# Patient Record
Sex: FEMALE | Race: WHITE | NOT HISPANIC OR LATINO | Employment: OTHER | URBAN - METROPOLITAN AREA
[De-identification: names, ages, dates, MRNs, and addresses within clinical notes are randomized per-mention and may not be internally consistent; named-entity substitution may affect disease eponyms.]

---

## 2017-01-10 ENCOUNTER — ALLSCRIPTS OFFICE VISIT (OUTPATIENT)
Dept: OTHER | Facility: OTHER | Age: 57
End: 2017-01-10

## 2017-01-10 DIAGNOSIS — Z12.31 ENCOUNTER FOR SCREENING MAMMOGRAM FOR MALIGNANT NEOPLASM OF BREAST: ICD-10-CM

## 2017-02-21 ENCOUNTER — GENERIC CONVERSION - ENCOUNTER (OUTPATIENT)
Dept: OTHER | Facility: OTHER | Age: 57
End: 2017-02-21

## 2017-02-24 ENCOUNTER — GENERIC CONVERSION - ENCOUNTER (OUTPATIENT)
Dept: OTHER | Facility: OTHER | Age: 57
End: 2017-02-24

## 2017-05-08 ENCOUNTER — HOSPITAL ENCOUNTER (OUTPATIENT)
Dept: RADIOLOGY | Facility: HOSPITAL | Age: 57
Discharge: HOME | End: 2017-05-08
Attending: FAMILY MEDICINE
Payer: COMMERCIAL

## 2017-05-08 DIAGNOSIS — Z12.31 ENCOUNTER FOR SCREENING MAMMOGRAM FOR MALIGNANT NEOPLASM OF BREAST: ICD-10-CM

## 2017-05-08 PROCEDURE — G0202 SCR MAMMO BI INCL CAD: HCPCS

## 2017-05-25 ENCOUNTER — ALLSCRIPTS OFFICE VISIT (OUTPATIENT)
Dept: OTHER | Facility: OTHER | Age: 57
End: 2017-05-25

## 2017-05-25 DIAGNOSIS — R10.32 LEFT LOWER QUADRANT PAIN: ICD-10-CM

## 2017-06-06 ENCOUNTER — GENERIC CONVERSION - ENCOUNTER (OUTPATIENT)
Dept: OTHER | Facility: OTHER | Age: 57
End: 2017-06-06

## 2017-08-28 ENCOUNTER — ALLSCRIPTS OFFICE VISIT (OUTPATIENT)
Dept: OTHER | Facility: OTHER | Age: 57
End: 2017-08-28

## 2017-10-24 NOTE — PROGRESS NOTES
Assessment  1  Medication monitoring encounter (V58 83) (Z51 81)   2  Screening for colorectal cancer (V76 51) (Z12 11,Z12 12)   3  Acute suppurative otitis media of right ear without spontaneous rupture of tympanic   membrane, recurrence not specified (382 00) (H66 001)   4  Acute maxillary sinusitis (461 0) (J01 00)   5  Acute otitis externa of right ear, unspecified type (380 10) (H60 501)    Plan   Acute otitis externa of right ear, unspecified type    · Neomycin-Polymyxin-HC 3 [id number] Otic Solution; INSTILL 4 DROPS INTO  BOTH EARS 3-4 TIMES DAILY    Herberth Land MD, Marzella Closs (Ophthalmology) Co-Management  monitoring opthalmologic toxicity of Plaquenil for RA  Status: Hold For - Scheduling  Requested for: 92Noe9921  Ordered; For: Medication monitoring encounter;  Ordered By: Catalino Cloud  Performed:   Due: 21MWR9312  3 - Debi MACDONALD, Redd Phelps  (Internal Medicine) Co-Management  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  Status: Hold For - Scheduling  Requested for: 70Icz1315  Ordered; For: History of seronegative inflammatory arthritis, Inflammatory arthritis, Medication monitoring encounter, Multiple joint pain;  Ordered By: Catalino Cloud  Performed:   Due: 55XQF2626  1 - Ella See MD, Abel Gan  (Otolaryngology) Co-Management  History of chronic recurrent ear infections and hearing loss, mutiple tubes placed  s/p sinus drainage 3 times past 10 years  had deviated septum surgery by Dr Navdeep Roldan  Status: Mancil Maker for: 92HER7910  Ordered;    For: Acute suppurative otitis media of right ear without spontaneous rupture of tympanic membrane, recurrence not specified;  Ordered By: Catalino Cloud  Performed:   Due: 53QEQ9219; Last Updated By: Catalino Cloud; 8/28/2017 2:39:27 PM     Discussion/Summary    # Otitis Externa:Rx for Neomycin polymyxin Otic solutionMedication monitoring for Plaquentil for RA Treatement:referral to Dr Herberth Land, opthalmologistSeronegative RArenewed referral to Dr Gutierrez Baxter for CRCreferral to GI for colonoscopyCervical CA screen: instructed patient to make appt for PAP  as needed  Dr Kristian Morales  Possible side effects of new medications were reviewed with the patient/guardian today  The treatment plan was reviewed with the patient/guardian  The patient/guardian understands and agrees with the treatment plan      Chief Complaint  Patient presents with right ear pain  History of Present Illness  HPI: 58 yo F PMH chronic recurrent ear infections and hearing loss, RA comes in for ear pain  Right ear pain 2-3 months ago but worsened past week associated with instance of discharge, sinus pain over last few months  Pain worsens when touching near opening of ear  Tried OTC Aleve without relief  Denies recent fevers or chills  has extensive ENT history with chronic ear infections, sinusitis with multiple tubes placed, s/p sinus drainage 3 times past 10 years  Patient has even had deviated septum surgery by Dr Mario Hernandez; chronic sinus infection      Review of Systems    Constitutional: as noted in HPI    ENT: as noted in HPI  Respiratory: no shortness of breath,-- no cough-- and-- no wheezing  Gastrointestinal: no nausea-- and-- no diarrhea  ROS reviewed  Active Problems  1  Abdominal pain, LLQ (left lower quadrant) (789 04) (R10 32)   2  Acute non-recurrent frontal sinusitis (461 1) (J01 10)   3  Allergic rhinitis (477 9) (J30 9)   4  Benign essential HTN (401 1) (I10)   5  BMI 40 0-44 9, adult (V85 41) (Z68 41)   6  Diverticulosis (562 10) (K57 90)   7  Encounter for screening mammogram for breast cancer (V76 12) (Z12 31)   8  Generalized osteoarthritis of multiple sites (715 09) (M15 9)   9  History of seronegative inflammatory arthritis (V13 59) (Z87 39)   10  Lumbosacral radiculitis (724 4) (M54 17)   11  Multiple joint pain (719 49) (M25 50)   12  Screening for diabetes mellitus (V77 1) (Z13 1)   13  Thyroid disorder screening (V77 0) (Z13 29)    Past Medical History  1   History of diverticulitis of colon (V12 79) (Z87 19)   2  History of hypothyroidism (V12 29) (Z86 39)   3  History of Lyme disease (V12 09) (Z86 19)   4  History of varicose veins (V12 59) (Z86 79)   5  History of Liver cyst (573 8) (K76 89)   6  History of Positive Anaplasma serology (082 40) (A77 40)   7  History of Renal cyst (753 10) (N28 1)   8  History of Seizure disorder in pregnancy (649 40,345 90) (O99 350,G40 909)  Active Problems And Past Medical History Reviewed: The active problems and past medical history were reviewed and updated today  Family History  Mother    1  Family history of Hypertension, benign  Father    2  Family history of arthritis (V17 7) (Z82 61)   3  Family history of hyperthyroidism (V18 19) (Z83 49)   4  Family history of Hypertension, benign  Family History Reviewed: The family history was reviewed and updated today  Social History   · Denied: History of Alcohol use   · Always uses seat belt   · Denied: History of Drug use   · Never a smoker  The social history was reviewed and updated today  The social history was reviewed and is unchanged  Surgical History  1  History of Ear Pressure Equalization Tube, Insertion, Bilaterally   2  History of Fusion / Refusion Of 2-8 Vertebrae   3  History of Laparoscopy With Adnexectomy   4  History of Liver Surgery   5  History of Partial Colectomy - Sigmoid   6  History of Sinus Surgery   7  History of Tonsillectomy   8  History of Total Knee Replacement Left   9  History of Total Knee Replacement Right  Surgical History Reviewed: The surgical history was reviewed and updated today  Current Meds   1  Atenolol 50 MG Oral Tablet; take 1 tablet by mouth once daily; Therapy: 04Yst8551 to (Evaluate:70Lke0215)  Requested for: 39Zgo6103; Last   Rx:60Ivq6545 Ordered   2  Fluticasone Propionate 50 MCG/ACT Nasal Suspension; Use 1 spray in each nostril twice   daily as needed for nasal congestion;    Therapy: 23RFB1387 to (Last OO:07DSV2697)  Requested for: 74SXB9195 Ordered   3  HydroCHLOROthiazide 25 MG Oral Tablet; take 1 tablet by mouth once daily; Therapy: 07IQY7199 to (96 042693)  Requested for: 21Mar2017; Last   Rx:21Mar2017 Ordered   4  Loratadine 10 MG Oral Tablet; take 1 tablet by mouth once daily; Therapy: 76Wmx9163 to (Evaluate:05Jan2018)  Requested for: 20VQC8677; Last   Rx:10Jan2017 Ordered   5  Meloxicam 15 MG Oral Tablet; Therapy: (Recorded:18Apr2016) to Recorded   6  Plaquenil 200 MG Oral Tablet; TAKE 1 TABLET TWICE DAILY WITH FOOD; Therapy: (Recorded:18Apr2016) to Recorded   7  TraMADol HCl - 50 MG Oral Tablet; TAKE 1 TABLET 3 TIMES DAILY AS NEEDED; Therapy: (Recorded:18Apr2016) to Recorded    The medication list was reviewed and updated today  Allergies  1  No Known Drug Allergies  2  No Known Food Allergies   3  No Known Latex Allergies    Vitals   Recorded: 33Axy1189 10:02AM   Temperature 97 8 F   Heart Rate 66   Respiration 16   Systolic 532   Diastolic 84   Height 5 ft 7 in   Weight 258 lb    BMI Calculated 40 41   BSA Calculated 2 25     Physical Exam    Constitutional   General appearance: No acute distress, well appearing and well nourished  Eyes   Conjunctiva and lids: No swelling, erythema or discharge  Pupils and irises: Equal, round and reactive to light  Ears, Nose, Mouth, and Throat   External inspection of ears and nose: Normal     Otoscopic examination: Abnormal   The right tympanic membrane was not bulging  The right external canal was swollen-- and-- was erythematous  Nasal mucosa, septum, and turbinates: Normal without edema or erythema  Oropharynx: Normal with no erythema, edema, exudate or lesions  Pulmonary   Respiratory effort: No increased work of breathing or signs of respiratory distress  Auscultation of lungs: Clear to auscultation  Cardiovascular   Auscultation of heart: Normal rate and rhythm, normal S1 and S2, without murmurs      Examination of extremities for edema and/or varicosities: Normal     Neurologic   Cranial nerves: Cranial nerves 2-12 intact  Sensation: No sensory loss  Attending Note  Attending Note [facility]: Attending Note: I did not interview and examine the patient,-- I supervised the Resident-- and-- I agree with the Resident management plan as it was presented to me  Level of Participation: I was present in clinic, but did not examine the patient  Comments/Additional Findings: Clarification: The acute ear illness this visit is otitis externa; the pt  has a h/o of sinusitis and otitis media with myringotomy tube placement  Signatures   Electronically signed by : Sandor Carmona MD; Aug 28 2017  9:30PM EST                       (Author)    Electronically signed by :  MIGDALIA Wilson ; Aug 29 2017  3:24PM EST                       (Co-author)

## 2017-11-28 ENCOUNTER — TRANSCRIBE ORDERS (OUTPATIENT)
Dept: ADMINISTRATIVE | Facility: HOSPITAL | Age: 57
End: 2017-11-28

## 2017-11-28 ENCOUNTER — HOSPITAL ENCOUNTER (OUTPATIENT)
Dept: RADIOLOGY | Facility: HOSPITAL | Age: 57
Discharge: HOME | End: 2017-11-28
Attending: INTERNAL MEDICINE
Payer: COMMERCIAL

## 2017-11-28 DIAGNOSIS — M54.40 LOW BACK PAIN WITH SCIATICA, SCIATICA LATERALITY UNSPECIFIED, UNSPECIFIED BACK PAIN LATERALITY, UNSPECIFIED CHRONICITY: ICD-10-CM

## 2017-11-28 DIAGNOSIS — M79.671 RIGHT FOOT PAIN: ICD-10-CM

## 2017-11-28 DIAGNOSIS — M06.4 INFLAMMATORY POLYARTHROPATHY (HCC): ICD-10-CM

## 2017-11-28 DIAGNOSIS — M06.09 RHEUMATOID ARTHRITIS OF MULTIPLE SITES WITHOUT RHEUMATOID FACTOR (HCC): ICD-10-CM

## 2017-11-28 DIAGNOSIS — M48.07 LUMBOSACRAL STENOSIS: ICD-10-CM

## 2017-11-28 DIAGNOSIS — M47.816 LUMBAR SPONDYLOSIS: ICD-10-CM

## 2017-11-28 DIAGNOSIS — S86.092S OTHER SPECIFIED INJURY OF LEFT ACHILLES TENDON, SEQUELA: ICD-10-CM

## 2017-11-28 DIAGNOSIS — M50.30 DEGENERATION OF CERVICAL INTERVERTEBRAL DISC: ICD-10-CM

## 2017-11-28 DIAGNOSIS — M79.643 PAIN OF HAND, UNSPECIFIED LATERALITY: Primary | ICD-10-CM

## 2017-11-28 DIAGNOSIS — M15.0 PRIMARY GENERALIZED HYPERTROPHIC OSTEOARTHROSIS: ICD-10-CM

## 2017-11-28 DIAGNOSIS — M79.643 PAIN OF HAND, UNSPECIFIED LATERALITY: ICD-10-CM

## 2017-11-28 PROCEDURE — 73630 X-RAY EXAM OF FOOT: CPT

## 2017-11-28 PROCEDURE — 73120 X-RAY EXAM OF HAND: CPT

## 2018-01-12 VITALS
TEMPERATURE: 97.8 F | OXYGEN SATURATION: 98 % | BODY MASS INDEX: 40.49 KG/M2 | DIASTOLIC BLOOD PRESSURE: 80 MMHG | RESPIRATION RATE: 18 BRPM | SYSTOLIC BLOOD PRESSURE: 122 MMHG | WEIGHT: 258 LBS | HEIGHT: 67 IN | HEART RATE: 66 BPM

## 2018-01-13 NOTE — RESULT NOTES
Discussion/Summary   Called patient with mammo results  All patient questions answered  Verified Results  * MAMMO SCREENING BILATERAL W CAD [id number] 01:07PM Anatoliy Bradley Order Number: [id number]    - Patient Instructions: To schedule this appointment, please contact Central Scheduling at [phone]  Do not wear any perfume, powder, lotion or deodorant on breast or underarm area  Please bring your doctors order, referral (if needed) and insurance information with you on the day of the test  Failure to bring this information may result in this test being rescheduled  Arrive 15 minutes prior to your appointment time to register  On the day of your test, please bring any prior mammogram or breast studies with you that were not performed at a [facility]  Failure to bring prior exams may result in your test needing to be rescheduled  Test Name Result Flag Reference   MAMMO SCREENING BILATERAL W CAD (Report)     Patient History:   Patient is postmenopausal    Patient's BMI is 24 7  Reason for exam: screening, asymptomatic  Screening     Mammo Screening Bilateral W CAD: May 8, 2017 - Check In #:    [de-identified]   Bilateral CC and MLO view(s) were taken  Technologist: SHANNAN Duarte   Prior study comparison: September 4, 2015, bilateral screening    mammogram, performed at [facility]  September 26, 2014,    bilateral screening mammogram, performed at [facility]      The breast tissue is almost entirely fat  Bilateral digital    mammography is performed  No dominant soft tissue mass,    architectural distortion or suspicious calcifications are noted  The skin and nipple contours are within normal limits  Scattered benign appearing calcifications are noted  No evidence    of malignancy  No significant changes when compared to prior    studies  1  No evidence of malignancy  2  No significant change when compared with the prior study       ACR BI-RADSï¾® Assessments: BiRad:2 - Benign     Recommendation:   Routine screening mammogram of both breasts in 1 year  A reminder letter will be scheduled   Analyzed by CAD     8-10% of cancers will be missed on mammography  Management of a    palpable abnormality must be based on clinical grounds  Patients   will be notified of their results via letter from our facility  Accredited by [employer] of [employer] and FDA       Transcription Location: [facility] [address]: [id number]     Risk Value(s):   Tyrer-Cuzick 10 Year: 2 500%, Tyrer-Cuzick Lifetime: 6 900%,    Myriad Table: 1 5%, APOLINAR 5 Year: 1 3%, NCI Lifetime: 7 6%   Signed by:   Aline Bender MD   5/8/17       Signatures   Electronically signed by : MIGDALIA Roa ; Jun 6 2017  4:47PM EST                       (Author)

## 2018-01-14 VITALS
OXYGEN SATURATION: 97 % | BODY MASS INDEX: 3.92 KG/M2 | HEIGHT: 67 IN | HEART RATE: 78 BPM | DIASTOLIC BLOOD PRESSURE: 70 MMHG | WEIGHT: 25 LBS | SYSTOLIC BLOOD PRESSURE: 118 MMHG | RESPIRATION RATE: 18 BRPM

## 2018-01-14 VITALS
SYSTOLIC BLOOD PRESSURE: 134 MMHG | HEIGHT: 67 IN | BODY MASS INDEX: 40.49 KG/M2 | TEMPERATURE: 97.8 F | RESPIRATION RATE: 16 BRPM | DIASTOLIC BLOOD PRESSURE: 84 MMHG | HEART RATE: 66 BPM | WEIGHT: 258 LBS

## 2018-01-15 NOTE — RESULT NOTES
Message  Called patient's home number  Left message with normal blood work results  Left CFP number for call back as needed        Signatures   Electronically signed by : MIGDALIA Ritchie ; Jun 13 2016  1:07PM EST                       (Author)

## 2018-01-15 NOTE — RESULT NOTES
Verified Results  ([facility]) Comp  Metabolic Panel (12) [id number] 12:00AM Betsey Clock     Test Name Result Flag Reference   Glucose, Serum 103 mg/dL H 65-99   BUN 14 mg/dL  6-24   Creatinine, Serum 0 73 mg/dL  0 57-1 00   eGFR If NonAfricn Am 92 mL/min/1 73  >59   eGFR If Africn Am 106 mL/min/1 73  >59   BUN/Creatinine Ratio 19  9-23   Sodium, Serum 142 mmol/L  134-144   Potassium, Serum 4 2 mmol/L  3 5-5 2   Chloride, Serum 101 mmol/L     Calcium, Serum 9 7 mg/dL  8 7-10 2   Protein, Total, Serum 6 9 g/dL  6 0-8 5   Albumin, Serum 4 6 g/dL  3 5-5 5   Globulin, Total 2 3 g/dL  1 5-4 5   A/G Ratio 2 0  1 1-2 5   Bilirubin, Total 0 6 mg/dL  0 0-1 2   Alkaline Phosphatase, S 109 IU/L     AST (SGOT) 29 IU/L  0-40     (LC) CBC, No Differential/Platelet [id number] 03:62NS Betsey Clock     Test Name Result Flag Reference   WBC 3 3 x10E3/uL L 3 4-10 8   RBC 4 52 x10E6/uL  3 77-5 28   Hemoglobin 14 0 g/dL  11 1-15 9   Hematocrit 41 5 %  34 0-46  6   MCV 92 fL  79-97   MCH 31 0 pg  26 6-33 0   MCHC 33 7 g/dL  31 5-35 7   RDW 13 4 %  12 3-15 4     (LC) Lipid Panel [id number] 12:00AM Betsey Clock     Test Name Result Flag Reference   Cholesterol, Total 177 mg/dL  100-199   Triglycerides 131 mg/dL  0-149   HDL Cholesterol 40 mg/dL  >39   According to ATP-III Guidelines, HDL-C >59 mg/dL is considered a  negative risk factor for CHD  VLDL Cholesterol Vipul 26 mg/dL  5-40   LDL Cholesterol Calc 111 mg/dL H 0-99     (LC) Sedimentation Rate-Westergren 84YOE8947 12:00AM Betsey Clock     Test Name Result Flag Reference   Sedimentation Rate-Westergren 6 mm/hr  0-40     (1) HEPATIC FUNCTION PANEL 59Ppx5316 12:00AM Betsey Clock     Test Name Result Flag Reference   Bilirubin, Direct 0 16 mg/dL  0 00-0 40   ALT (SGPT) 37 IU/L H 0-32       Discussion/Summary   Called patient at home number  Left message stating normal bloodwork  Left CFP number for callback        Signatures   Electronically signed by : Arvind Graham, M D ; Jul 5 2016  1:41PM EST                       (Author)

## 2018-03-01 ENCOUNTER — TELEPHONE (OUTPATIENT)
Dept: GASTROENTEROLOGY | Facility: AMBULARY SURGERY CENTER | Age: 58
End: 2018-03-01

## 2018-04-19 ENCOUNTER — OFFICE VISIT (OUTPATIENT)
Dept: FAMILY MEDICINE CLINIC | Facility: CLINIC | Age: 58
End: 2018-04-19
Payer: COMMERCIAL

## 2018-04-19 VITALS
HEART RATE: 81 BPM | BODY MASS INDEX: 39.55 KG/M2 | SYSTOLIC BLOOD PRESSURE: 150 MMHG | DIASTOLIC BLOOD PRESSURE: 72 MMHG | OXYGEN SATURATION: 99 % | HEIGHT: 67 IN | WEIGHT: 252 LBS | RESPIRATION RATE: 16 BRPM

## 2018-04-19 DIAGNOSIS — R73.9 HYPERGLYCEMIA: ICD-10-CM

## 2018-04-19 DIAGNOSIS — I10 ESSENTIAL HYPERTENSION: ICD-10-CM

## 2018-04-19 DIAGNOSIS — T78.40XA ALLERGIC STATE, INITIAL ENCOUNTER: ICD-10-CM

## 2018-04-19 DIAGNOSIS — Z00.00 HEALTH CARE MAINTENANCE: Primary | ICD-10-CM

## 2018-04-19 DIAGNOSIS — Z12.11 SCREEN FOR COLON CANCER: ICD-10-CM

## 2018-04-19 DIAGNOSIS — E03.9 HYPOTHYROIDISM, UNSPECIFIED TYPE: ICD-10-CM

## 2018-04-19 PROCEDURE — 99396 PREV VISIT EST AGE 40-64: CPT | Performed by: FAMILY MEDICINE

## 2018-04-19 RX ORDER — FLUTICASONE PROPIONATE 50 MCG
SPRAY, SUSPENSION (ML) NASAL
COMMUNITY
Start: 2017-01-10 | End: 2018-09-24 | Stop reason: SDUPTHER

## 2018-04-19 RX ORDER — MELOXICAM 15 MG/1
TABLET ORAL DAILY
COMMUNITY
End: 2020-02-13

## 2018-04-19 RX ORDER — TRAMADOL HYDROCHLORIDE 50 MG/1
1 TABLET ORAL EVERY 8 HOURS PRN
COMMUNITY
End: 2019-01-11

## 2018-04-19 RX ORDER — ATENOLOL 50 MG/1
1 TABLET ORAL DAILY
COMMUNITY
Start: 2016-04-13 | End: 2018-04-19 | Stop reason: ALTCHOICE

## 2018-04-19 RX ORDER — ATENOLOL 100 MG/1
100 TABLET ORAL DAILY
Qty: 30 TABLET | Refills: 6 | Status: SHIPPED | OUTPATIENT
Start: 2018-04-19 | End: 2018-11-30 | Stop reason: SDUPTHER

## 2018-04-19 RX ORDER — LORATADINE 10 MG/1
10 TABLET ORAL DAILY
Qty: 30 TABLET | Refills: 6 | Status: SHIPPED | OUTPATIENT
Start: 2018-04-19 | End: 2018-09-24 | Stop reason: SDUPTHER

## 2018-04-19 RX ORDER — HYDROCHLOROTHIAZIDE 25 MG/1
1 TABLET ORAL DAILY
COMMUNITY
Start: 2016-10-24 | End: 2018-04-20 | Stop reason: CLARIF

## 2018-04-19 RX ORDER — HYDROXYCHLOROQUINE SULFATE 200 MG/1
200 TABLET, FILM COATED ORAL 2 TIMES DAILY WITH MEALS
Refills: 0 | COMMUNITY
Start: 2018-04-14

## 2018-04-19 RX ORDER — LORATADINE 10 MG/1
1 TABLET ORAL DAILY
COMMUNITY
Start: 2016-04-13 | End: 2018-04-19 | Stop reason: SDUPTHER

## 2018-04-20 NOTE — PROGRESS NOTES
Assessment/Plan:    No problem-specific Assessment & Plan notes found for this encounter  Diagnoses and all orders for this visit:    Health care maintenance    Essential hypertension  -     atenolol (TENORMIN) 100 mg tablet; Take 1 tablet (100 mg total) by mouth daily  -     Lipid panel    Hypothyroidism, unspecified type  -     TSH, 3rd generation with T4 reflex    Hyperglycemia  -     HEMOGLOBIN A1C W/ EAG ESTIMATION    Allergic state, initial encounter  -     loratadine (CLARITIN) 10 mg tablet; Take 1 tablet (10 mg total) by mouth daily    Screen for colon cancer  -     Cologuard    Other orders  -     Discontinue: atenolol (TENORMIN) 50 mg tablet; Take 1 tablet by mouth daily  -     RESTASIS MULTIDOSE 0 05 % ophthalmic emulsion;   -     hydrochlorothiazide (HYDRODIURIL) 25 mg tablet; Take 1 tablet by mouth daily  -     fluticasone (FLONASE) 50 mcg/act nasal spray; into each nostril  -     hydroxychloroquine (PLAQUENIL) 200 mg tablet; Take 200 mg by mouth 2 (two) times a day with meals  -     Discontinue: loratadine (CLARITIN) 10 mg tablet; Take 1 tablet by mouth daily  -     meloxicam (MOBIC) 15 mg tablet; Take by mouth  -     traMADol (ULTRAM) 50 mg tablet; Take 1 tablet by mouth 2 (two) times a day          Breast Cancer Screening:  Risks and Benefits discussed  Given slip for mammogram  Cervical Cancer Screening:  Risks and Benefits discussed  Patient stated she will schedule Pap smear    Colorectal Cancer Screening:  Risks and Benefits discussed  Patient refused colonoscopy but will accept Cologuard      Preventing Counseling:  Advice and education were given regarding nutrition, aerobic exercises, weight bearing exercises, cardiovascular risk reduction, fall risk reduction, and age appropriate supplements       The patient was counseled regarding instructions for management, risk factor reductions, prognosis, risks and benefits of treatment options, patient and family education, and importance of compliance with treatment  With her elevated blood pressure I have double up her atenolol, she was advised to follow-up in a month to see how is her blood pressure 2 weeks I also give her slip for her Cholesterol profile, TSH and hemoglobin A1c  Subjective:      Patient ID: Loan Saha is a 59 y o  female  Visit Type: Health Maintenance    General Health:     Dental Regular visits: No    Vision Problems: No    Hearing loss: No    Life Style  Healthy Diet:  Can be improve, try to be healthy  Regular Exercise: No  Weight Concerns: Yes  Tobacco Use: No  Alcohol Use: Social  Drug Use: No      Reproductive Health  Sexually Active: Yes  Contraception: No        Chief Complaint   Patient presents with    Well Check     CPE       47-year-old female comes in for complete physical, she stated she had noticed that her blood pressure has been persistently high, she stated she is not sure why she is on atenolol, she has been taking it for the last 30 years, she did not had a heart attack, She stated to be working fine until recently, recently she was diagnosed to have rheumatoid arthritis with her hematologist, currently taking Plaquenil And Tramadol from Dr Paxton Valenzuela, She stated that she also has history of hypothyroidism however she is currently not taking any medication            The following portions of the patient's history were reviewed and updated as appropriate: allergies, current medications, past family history, past medical history, past social history, past surgical history and problem list       Review of Systems   Constitutional: Negative for activity change, appetite change, chills, diaphoresis, fatigue, fever and unexpected weight change  HENT: Negative for congestion, dental problem, drooling, ear discharge, ear pain, facial swelling, hearing loss, mouth sores, nosebleeds, postnasal drip, rhinorrhea, sinus pain and sinus pressure      Eyes: Negative for photophobia, pain, discharge, redness, itching and visual disturbance  Respiratory: Negative for apnea, cough, choking, chest tightness, shortness of breath, wheezing and stridor  Cardiovascular: Negative for chest pain, palpitations and leg swelling  Gastrointestinal: Negative for abdominal distention, abdominal pain, anal bleeding, blood in stool, constipation, diarrhea, nausea, rectal pain and vomiting  Endocrine: Negative for cold intolerance, heat intolerance, polydipsia, polyphagia and polyuria  Genitourinary: Negative for decreased urine volume, difficulty urinating, dyspareunia, dysuria, flank pain, hematuria and pelvic pain  Musculoskeletal: (+)arthralgias, (+)back pain, (+)gait problem  Skin: Negative for color change, pallor, rash and wound  Allergic/Immunologic: Negative for environmental allergies, food allergies and immunocompromised state  Neurological: Negative for dizziness, tremors, seizures, syncope, facial asymmetry, speech difficulty, weakness, light-headedness, numbness and headaches  Hematological: Negative for adenopathy  Does not bruise/bleed easily  Psychiatric/Behavioral: Negative for agitation, behavioral problems, confusion, decreased concentration, dysphoric mood, hallucinations, sleep disturbance and suicidal ideas  The patient is not nervous/anxious  Objective:    History   Smoking Status    Never Smoker   Smokeless Tobacco    Not on file       Allergies:    Allergies   Allergen Reactions    Other Other (See Comments)     headaches       Vitals:  /72 (BP Location: Left arm, Patient Position: Sitting)   Pulse 81   Resp 16   Ht 5' 7" (1 702 m)   Wt 114 kg (252 lb)   SpO2 99%   BMI 39 47 kg/m²     Current Outpatient Prescriptions   Medication Sig Dispense Refill    fluticasone (FLONASE) 50 mcg/act nasal spray into each nostril      hydrochlorothiazide (HYDRODIURIL) 25 mg tablet Take 1 tablet by mouth daily      loratadine (CLARITIN) 10 mg tablet Take 1 tablet (10 mg total) by mouth daily 30 tablet 6    atenolol (TENORMIN) 100 mg tablet Take 1 tablet (100 mg total) by mouth daily 30 tablet 6    hydroxychloroquine (PLAQUENIL) 200 mg tablet Take 200 mg by mouth 2 (two) times a day with meals  0    meloxicam (MOBIC) 15 mg tablet Take by mouth      RESTASIS MULTIDOSE 0 05 % ophthalmic emulsion       traMADol (ULTRAM) 50 mg tablet Take 1 tablet by mouth 2 (two) times a day       No current facility-administered medications for this visit  Physical Exam   Constitutional:  oriented to person, place, and time  well-developed and well-nourished  No distress  HENT:  Normocephalic and atraumatic, Oropharynx is clear and moist  No oropharyngeal exudate  Conjunctivae and EOM are normal  Pupils are equal, round, and reactive to light  No scleral icterus  Neck: Normal range of motion  Neck supple  No JVD present  No thyromegaly present  Cardiovascular: Normal rate, regular rhythm, normal heart sounds and intact distal pulses  Exam reveals no gallop and no friction rub  No murmur heard  Pulmonary/Chest: Effort normal and breath sounds normal  No respiratory distress  no wheezes  no rales  no tenderness  Abdominal: Soft  Bowel sounds are normal  no distension and no mass  There is no tenderness  There is no rebound and no guarding  Musculoskeletal: Normal range of motion   Lymphadenopathy:   no cervical adenopathy  Neurological: alert and oriented to person, place, and time  No cranial nerve deficit  normal muscle tone  Coordination normal    Skin: Skin is warm and dry  No rash noted  not diaphoretic  No erythema  Psychiatric:normal mood and affect  behavior is normal  Judgment and thought content normal    Nursing note and vitals reviewed            Results Reviewed     None        PHQ9 REVIEWED  Immunization status: Patient need Hepatitis B and adacel, however she had refused both,

## 2018-05-01 ENCOUNTER — TELEPHONE (OUTPATIENT)
Dept: FAMILY MEDICINE CLINIC | Facility: CLINIC | Age: 58
End: 2018-05-01

## 2018-05-03 DIAGNOSIS — I10 ESSENTIAL HYPERTENSION: Primary | ICD-10-CM

## 2018-05-03 RX ORDER — HYDROCHLOROTHIAZIDE 25 MG/1
25 TABLET ORAL DAILY
Qty: 30 TABLET | Refills: 6 | Status: SHIPPED | OUTPATIENT
Start: 2018-05-03 | End: 2018-11-30 | Stop reason: SDUPTHER

## 2018-05-30 LAB — HBA1C MFR BLD HPLC: 5.4 %

## 2018-06-01 LAB
AMBIG ABBREV DEFAULT: NORMAL
CHOLEST SERPL-MCNC: 159 MG/DL (ref 100–199)
EST. AVERAGE GLUCOSE BLD GHB EST-MCNC: 108 MG/DL
HBA1C MFR BLD: 5.4 % (ref 4.8–5.6)
HDLC SERPL-MCNC: 44 MG/DL
LDLC SERPL CALC-MCNC: 96 MG/DL (ref 0–99)
SL AMB VLDL CHOLESTEROL CALC: 19 MG/DL (ref 5–40)
T4 SERPL-MCNC: 6.3 UG/DL (ref 4.5–12)
TRIGL SERPL-MCNC: 95 MG/DL (ref 0–149)
TSH SERPL DL<=0.005 MIU/L-ACNC: 6.76 UIU/ML (ref 0.45–4.5)

## 2018-06-11 ENCOUNTER — OFFICE VISIT (OUTPATIENT)
Dept: FAMILY MEDICINE CLINIC | Facility: CLINIC | Age: 58
End: 2018-06-11
Payer: COMMERCIAL

## 2018-06-11 VITALS
HEART RATE: 73 BPM | WEIGHT: 251 LBS | RESPIRATION RATE: 16 BRPM | OXYGEN SATURATION: 99 % | BODY MASS INDEX: 39.39 KG/M2 | HEIGHT: 67 IN | DIASTOLIC BLOOD PRESSURE: 76 MMHG | SYSTOLIC BLOOD PRESSURE: 122 MMHG

## 2018-06-11 DIAGNOSIS — Z23 NEED FOR TD VACCINE: ICD-10-CM

## 2018-06-11 DIAGNOSIS — E03.8 SUBCLINICAL HYPOTHYROIDISM: ICD-10-CM

## 2018-06-11 DIAGNOSIS — M62.838 MUSCLE SPASM: ICD-10-CM

## 2018-06-11 DIAGNOSIS — I10 ESSENTIAL HYPERTENSION: Primary | ICD-10-CM

## 2018-06-11 DIAGNOSIS — M54.2 NECK PAIN: ICD-10-CM

## 2018-06-11 PROCEDURE — 90471 IMMUNIZATION ADMIN: CPT | Performed by: FAMILY MEDICINE

## 2018-06-11 PROCEDURE — 3008F BODY MASS INDEX DOCD: CPT | Performed by: FAMILY MEDICINE

## 2018-06-11 PROCEDURE — 99214 OFFICE O/P EST MOD 30 MIN: CPT | Performed by: FAMILY MEDICINE

## 2018-06-11 PROCEDURE — 3074F SYST BP LT 130 MM HG: CPT | Performed by: FAMILY MEDICINE

## 2018-06-11 PROCEDURE — 90714 TD VACC NO PRESV 7 YRS+ IM: CPT | Performed by: FAMILY MEDICINE

## 2018-06-11 PROCEDURE — 3078F DIAST BP <80 MM HG: CPT | Performed by: FAMILY MEDICINE

## 2018-06-11 RX ORDER — KETOROLAC TROMETHAMINE 30 MG/ML
60 INJECTION, SOLUTION INTRAMUSCULAR; INTRAVENOUS ONCE
Status: COMPLETED | OUTPATIENT
Start: 2018-06-11 | End: 2018-06-11

## 2018-06-11 RX ORDER — CYCLOBENZAPRINE HCL 10 MG
10 TABLET ORAL 3 TIMES DAILY PRN
Qty: 90 TABLET | Refills: 6 | Status: SHIPPED | OUTPATIENT
Start: 2018-06-11 | End: 2019-01-11

## 2018-06-11 RX ADMIN — KETOROLAC TROMETHAMINE 60 MG: 30 INJECTION, SOLUTION INTRAMUSCULAR; INTRAVENOUS at 11:35

## 2018-06-11 NOTE — PROGRESS NOTES
Assessment/Plan:    No problem-specific Assessment & Plan notes found for this encounter  Diagnoses and all orders for this visit:    Essential hypertension    Subclinical hypothyroidism  -     Ambulatory referral to Endocrinology; Future    Muscle spasm  -     cyclobenzaprine (FLEXERIL) 10 mg tablet; Take 1 tablet (10 mg total) by mouth 3 (three) times a day as needed for muscle spasms  -     ketorolac (TORADOL) 60 mg/2 mL IM injection 60 mg; Inject 2 mL (60 mg total) into the shoulder, thigh, or buttocks once     Neck pain    Need for Td vaccine  -     TD VACCINE GREATER THAN OR EQUAL TO 8YO PRESERVATIVE FREE IM          I have spoke with patient's condition at length, she will go see an endocrinologist regarding her subclinical hypothyroidism, blood pressure is normal she will continue take the medication, cholesterol is normal as well she will continue doing what she is doing and follow up in 3 months  Discussed with the patient and all questioned fully answered  She will call me if any problems arise  Subjective:      Patient ID: Marilynn Leon is a 59 y o  female      Chief Complaint   Patient presents with    Follow-up     BW, also having left shoulder pain from possible neck pain       61-year-old female comes in for follow-up, her high blood pressure seems to be much improved, we had increased atenolol last visit, that his blood pressure is at 122/76, patient stated she is feeling much better as well, she is also complaining of neck pain and muscle spasm on her neck with severe pain numbness and tingling sensation on the left neck and shoulder area, Recent blood work also show patient has high TSH but normal free T4, she stated that she was diagnosed to have subclinical hypothyroidism many years ago, never had any workup but at one point was placed on medication, And subsequently weaned off????  Patient stated that she was never seen the endocrinologist   Last visit we had to adjust her blood pressure medication, her blood pressure seems to be great this visit        The following portions of the patient's history were reviewed and updated as appropriate: allergies, current medications, past family history, past medical history, past social history, past surgical history and problem list       Review of Systems   Constitutional: Negative for activity change, appetite change, fatigue and unexpected weight change  Respiratory: Negative for apnea, cough, choking, chest tightness and shortness of breath  Cardiovascular: Negative for chest pain, palpitations and leg swelling  Gastrointestinal: Negative for abdominal distention, abdominal pain, anal bleeding, blood in stool and constipation  Endocrine: Negative for cold intolerance and heat intolerance  Musculoskeletal: Negative for arthralgias, back pain, gait problem and joint swelling  Neurological: Negative for dizziness and facial asymmetry  Psychiatric/Behavioral: Negative for agitation, behavioral problems, confusion and decreased concentration  Objective:    /76 (BP Location: Left arm, Patient Position: Sitting)   Pulse 73   Resp 16   Ht 5' 7" (1 702 m)   Wt 114 kg (251 lb)   SpO2 99%   BMI 39 31 kg/m²       Physical Exam   Constitutional:  oriented to person, place, and time  well-developed and   well-nourished  No distress  Cardiovascular: Normal rate, regular rhythm, normal heart sounds and intact distal pulses  Exam reveals no gallop and no friction rub  No murmur heard  Pulmonary/Chest: Effort normal and breath sounds normal  No respiratory distress  no wheezes  no rales  no tenderness  Abdominal: Soft  Bowel sounds are normal  no distension  There is no tenderness  There is no rebound and no guarding  Musculoskeletal: Normal range of motion  Some paresthesia on the left neck and shoulder area  Neurological:  alert and oriented to person, place, and time  normal reflexes      Psychiatric: normal mood and affect  behavior is normal  Judgment and thought content normal

## 2018-07-24 ENCOUNTER — TRANSCRIBE ORDERS (OUTPATIENT)
Dept: ADMINISTRATIVE | Facility: HOSPITAL | Age: 58
End: 2018-07-24

## 2018-07-24 ENCOUNTER — HOSPITAL ENCOUNTER (OUTPATIENT)
Dept: RADIOLOGY | Facility: HOSPITAL | Age: 58
Discharge: HOME | End: 2018-07-24
Attending: INTERNAL MEDICINE
Payer: COMMERCIAL

## 2018-07-24 DIAGNOSIS — M72.2 PLANTAR FASCIAL FIBROMATOSIS: ICD-10-CM

## 2018-07-24 DIAGNOSIS — M72.2 PLANTAR FASCIAL FIBROMATOSIS: Primary | ICD-10-CM

## 2018-07-24 DIAGNOSIS — M77.8 TENDINITIS OF LEFT SHOULDER: ICD-10-CM

## 2018-07-24 PROCEDURE — 73630 X-RAY EXAM OF FOOT: CPT

## 2018-07-24 PROCEDURE — 73030 X-RAY EXAM OF SHOULDER: CPT

## 2018-09-12 ENCOUNTER — OFFICE VISIT (OUTPATIENT)
Dept: FAMILY MEDICINE CLINIC | Facility: CLINIC | Age: 58
End: 2018-09-12
Payer: COMMERCIAL

## 2018-09-12 VITALS
RESPIRATION RATE: 16 BRPM | SYSTOLIC BLOOD PRESSURE: 152 MMHG | BODY MASS INDEX: 37.9 KG/M2 | DIASTOLIC BLOOD PRESSURE: 98 MMHG | HEART RATE: 87 BPM | OXYGEN SATURATION: 97 % | WEIGHT: 242 LBS

## 2018-09-12 DIAGNOSIS — M25.512 ACUTE PAIN OF LEFT SHOULDER: Primary | ICD-10-CM

## 2018-09-12 PROCEDURE — 20605 DRAIN/INJ JOINT/BURSA W/O US: CPT | Performed by: FAMILY MEDICINE

## 2018-09-12 NOTE — PROGRESS NOTES
Fitz Valenzuela is a 59 y o  female  /98   Pulse 87   Resp 16   Wt 110 kg (242 lb)   SpO2 97%   BMI 37 90 kg/m²     PROCEDURE: AC Joint Injection  Indication: Left AC joint Pain  Location: Left AC joint  Pre-Procedure Diagnosis: AC joint Osteoarthritis/Pain  Post-Procedure Diagnosis: Same  Performing Physician: Alexus Worthington  Informed consent: Procedure, alternate treatment options, risks, and benefits were thoroughly explained to the patient and informed consent was obtained before the procedure started  History Of Present Illness:  59year old female comes in complaining of left shoulder pain, had injection with rheumatologist feel better for few months, now she is having severe pain again, she had requested me to perform injection due to she is going for vacation  Review of Systems   Constitutional: Negative for activity change, appetite change, fatigue and unexpected weight change  Musculoskeletal: Negative for  back pain, gait problem and joint swelling  (+) Left shoulder arthralgias,  Neurological: Negative for dizziness and facial asymmetry  Psychiatric/Behavioral: Negative for agitation, behavioral problems, confusion and decreased concentration  Physical Exam   Constitutional:  oriented to person, place, and time  well-developed and   well-nourished  No distress  Musculoskeletal: Normal range of motion  Some paresthesia on the left neck and shoulder area  Psychiatric: normal mood and affect  behavior is normal  Judgment and thought content normal      PROCEDURE:  The appropriate time out was taken  The area was prepped in usual sterile fashion  Local anesthesia achieved using Ethyl Chloride spray (Gebauers cold spray)  Lidocaine 1% without epinephrine 2 ml and Kenalog 40mg in 1mL was injected into joint using posterior approach  Band aid applied    DISPOSITION:    Anticipatory guidance, as well as standard post-procedure care, was explained  Return precautions are given   The patient tolerated the procedure well without complications  Follow-up visit in 2 weeks Advised

## 2018-09-24 DIAGNOSIS — T78.40XA ALLERGIC STATE, INITIAL ENCOUNTER: ICD-10-CM

## 2018-09-24 RX ORDER — LORATADINE 10 MG/1
10 TABLET ORAL DAILY
Qty: 30 TABLET | Refills: 0 | Status: SHIPPED | OUTPATIENT
Start: 2018-09-24 | End: 2018-12-17 | Stop reason: SDUPTHER

## 2018-09-24 RX ORDER — FLUTICASONE PROPIONATE 50 MCG
2 SPRAY, SUSPENSION (ML) NASAL DAILY
Qty: 16 G | Refills: 3 | Status: SHIPPED | OUTPATIENT
Start: 2018-09-24 | End: 2020-02-13 | Stop reason: SDUPTHER

## 2018-10-22 ENCOUNTER — OFFICE VISIT (OUTPATIENT)
Dept: OBGYN CLINIC | Facility: CLINIC | Age: 58
End: 2018-10-22
Payer: COMMERCIAL

## 2018-10-22 VITALS
HEIGHT: 67 IN | DIASTOLIC BLOOD PRESSURE: 79 MMHG | HEART RATE: 69 BPM | BODY MASS INDEX: 38.61 KG/M2 | WEIGHT: 246 LBS | SYSTOLIC BLOOD PRESSURE: 151 MMHG

## 2018-10-22 DIAGNOSIS — M75.122 COMPLETE TEAR OF LEFT ROTATOR CUFF: Primary | ICD-10-CM

## 2018-10-22 PROCEDURE — 99244 OFF/OP CNSLTJ NEW/EST MOD 40: CPT | Performed by: ORTHOPAEDIC SURGERY

## 2018-10-22 NOTE — PROGRESS NOTES
Assessment/Plan:  1  Complete tear of left rotator cuff       Scribe Attestation    I,:   Pepeyazan Danielito Call am acting as a scribe while in the presence of the attending physician :        I,:   Liz Borrego MD personally performed the services described in this documentation    as scribed in my presence :              Triny has a large tear of the supraspinatus tendon  I did not feel that physical therapy would help this tear and fear it will only worsen with further retraction  Her pain has continued to worsen over the past year and I feel she would benefit from a left shoulder arthroscopy to repair the rotator cuff  We discussed the procedure in great detail  We also discussed the risks of the surgery  Risk of the surgery are inclusive of but not limited to bleeding, infection, nerve injury, blood clot, worsening of symptoms, not achieving the anticipated results, persistent stiffness, weakness and the need for additional surgery  The patient verbally stated she understood those risks and would like to proceed with the surgery  We also discussed recovery  She will be in a sling for 6 weeks following her surgery and will require extensive amount of physical therapy  She would like to delay surgery until December  She will sit with my  today and arrange a date  Subjective:   Nelly Smith is a 59 y o  female who presents today for evaluation of her left shoulder  She was referred to us by Dr Tawnya Minaya states that she has been experiencing pain in the left shoulder for approximately 1 year that has been worsening recently  Her chief complaint is of the mild to moderate persistent ache in the anterior and lateral aspect of the left shoulder that will radiate to the left upper arm  Overhead activities will cause an increase in her pain  At night she states the shoulder pain is quite bothersome and will wake her often  She does feel better with the use of Kinesio tape    She denies distal paresthesias  An additional complaint includes weakness in the left upper extremity with shoulder movement  Review of Systems   Constitutional: Negative for chills, fever and unexpected weight change  HENT: Negative for hearing loss, nosebleeds and sore throat  Eyes: Negative for pain, redness and visual disturbance  Respiratory: Negative for cough, shortness of breath and wheezing  Cardiovascular: Negative for chest pain, palpitations and leg swelling  Gastrointestinal: Negative for abdominal pain, nausea and vomiting  Endocrine: Negative for polydipsia and polyuria  Genitourinary: Negative for dysuria and hematuria  Musculoskeletal:        See HPI   Skin: Negative for rash and wound  Neurological: Negative for dizziness, numbness and headaches  Psychiatric/Behavioral: Negative for decreased concentration and suicidal ideas  The patient is not nervous/anxious  Past Medical History:   Diagnosis Date    Diverticulitis, colon     Hypothyroidism     Liver cyst     last assessed 4/18/16    Lyme disease     Positive Anaplasma serology     Renal cyst     Seizure disorder in pregnancy ([facility] [address] )     Varicose vein of leg        Past Surgical History:   Procedure Laterality Date    BACK SURGERY      fusion/refusion of 2-8 vertebrae    COLECTOMY      partial sigmoid, last assessed 4/18/16    LAPAROSCOPY      with adnexectomy    LIVER SURGERY      REPLACEMENT TOTAL KNEE Bilateral     SINUS SURGERY      TONSILLECTOMY      TYMPANOSTOMY TUBE PLACEMENT         Family History   Problem Relation Age of Onset    Hypertension Mother     Arthritis Father     Hyperthyroidism Father     Hypertension Father        Social History     Occupational History    Not on file       Social History Main Topics    Smoking status: Never Smoker    Smokeless tobacco: Never Used    Alcohol use No    Drug use: No    Sexual activity: Not on file         Current Outpatient Prescriptions:    atenolol (TENORMIN) 100 mg tablet, Take 1 tablet (100 mg total) by mouth daily, Disp: 30 tablet, Rfl: 6    cyclobenzaprine (FLEXERIL) 10 mg tablet, Take 1 tablet (10 mg total) by mouth 3 (three) times a day as needed for muscle spasms, Disp: 90 tablet, Rfl: 6    fluticasone (FLONASE) 50 mcg/act nasal spray, 2 sprays into each nostril daily, Disp: 16 g, Rfl: 3    hydrochlorothiazide (HYDRODIURIL) 25 mg tablet, Take 1 tablet (25 mg total) by mouth daily, Disp: 30 tablet, Rfl: 6    hydroxychloroquine (PLAQUENIL) 200 mg tablet, Take 200 mg by mouth 2 (two) times a day with meals, Disp: , Rfl: 0    meloxicam (MOBIC) 15 mg tablet, Take by mouth, Disp: , Rfl:     RESTASIS MULTIDOSE 0 05 % ophthalmic emulsion, , Disp: , Rfl:     traMADol (ULTRAM) 50 mg tablet, Take 1 tablet by mouth daily  , Disp: , Rfl:     loratadine (CLARITIN) 10 mg tablet, Take 1 tablet (10 mg total) by mouth daily (Patient not taking: Reported on 10/22/2018 ), Disp: 30 tablet, Rfl: 0    Allergies   Allergen Reactions    Pollen Extract Other (See Comments)     headaches       Objective:  Vitals:    10/22/18 0847   BP: 151/79   Pulse: 69       Left Shoulder Exam     Tenderness   The patient is experiencing no tenderness  Range of Motion   Active Abduction: 160   Forward Flexion: 160   External Rotation: 70   Internal Rotation 0 degrees: L3     Muscle Strength   Abduction: 3/5   Internal Rotation: 5/5   External Rotation: 5/5   Supraspinatus: 3/5   Subscapularis: 5/5     Tests   Hawkin's test: negative  Impingement: positive    Other   Sensation: normal  Pulse: present (2+ radial)     Comments:  Positive empty can            Physical Exam   Constitutional: She is oriented to person, place, and time  She appears well-developed and well-nourished  HENT:   Head: Normocephalic and atraumatic  Eyes: Conjunctivae and EOM are normal    Neck: Normal range of motion  Neck supple  Cardiovascular: Normal heart sounds and intact distal pulses  Pulmonary/Chest: Effort normal and breath sounds normal  No respiratory distress  Neurological: She is alert and oriented to person, place, and time  Skin: Skin is warm and dry  Psychiatric: She has a normal mood and affect  Her behavior is normal    Vitals reviewed  I have personally reviewed pertinent films in PACS and my interpretation is as follows:  MRI of the left shoulder demonstrates a large tear of the supraspinatus tendon with mild retraction

## 2018-11-20 ENCOUNTER — TELEPHONE (OUTPATIENT)
Dept: PREADMISSION TESTING | Facility: HOSPITAL | Age: 58
End: 2018-11-20

## 2018-11-30 DIAGNOSIS — I10 ESSENTIAL HYPERTENSION: ICD-10-CM

## 2018-12-01 RX ORDER — HYDROCHLOROTHIAZIDE 25 MG/1
TABLET ORAL
Qty: 30 TABLET | Refills: 6 | Status: SHIPPED | OUTPATIENT
Start: 2018-12-01 | End: 2019-06-12 | Stop reason: SDUPTHER

## 2018-12-01 RX ORDER — ATENOLOL 100 MG/1
TABLET ORAL
Qty: 30 TABLET | Refills: 6 | Status: SHIPPED | OUTPATIENT
Start: 2018-12-01 | End: 2019-06-12 | Stop reason: SDUPTHER

## 2018-12-07 ENCOUNTER — APPOINTMENT (OUTPATIENT)
Dept: PREADMISSION TESTING | Facility: HOSPITAL | Age: 58
End: 2018-12-07
Payer: COMMERCIAL

## 2018-12-07 ENCOUNTER — OFFICE VISIT (OUTPATIENT)
Dept: LAB | Facility: HOSPITAL | Age: 58
End: 2018-12-07
Attending: ORTHOPAEDIC SURGERY
Payer: COMMERCIAL

## 2018-12-07 ENCOUNTER — TRANSCRIBE ORDERS (OUTPATIENT)
Dept: ADMINISTRATIVE | Facility: HOSPITAL | Age: 58
End: 2018-12-07

## 2018-12-07 DIAGNOSIS — M75.122 COMPLETE TEAR OF LEFT ROTATOR CUFF: ICD-10-CM

## 2018-12-07 DIAGNOSIS — Z01.818 PRE-OP TESTING: ICD-10-CM

## 2018-12-07 DIAGNOSIS — Z01.818 PREOP EXAMINATION: ICD-10-CM

## 2018-12-07 DIAGNOSIS — Z01.818 PREOP EXAMINATION: Primary | ICD-10-CM

## 2018-12-07 LAB
ALBUMIN SERPL BCP-MCNC: 4.1 G/DL (ref 3.5–5)
ALP SERPL-CCNC: 111 U/L (ref 46–116)
ALT SERPL W P-5'-P-CCNC: 33 U/L (ref 12–78)
ANION GAP SERPL CALCULATED.3IONS-SCNC: 8 MMOL/L (ref 4–13)
APTT PPP: 28 SECONDS (ref 24–33)
AST SERPL W P-5'-P-CCNC: 23 U/L (ref 5–45)
BASOPHILS # BLD AUTO: 0.05 THOUSANDS/ΜL (ref 0–0.1)
BASOPHILS NFR BLD AUTO: 2 % (ref 0–1)
BILIRUB SERPL-MCNC: 0.6 MG/DL (ref 0.2–1)
BUN SERPL-MCNC: 16 MG/DL (ref 5–25)
CALCIUM SERPL-MCNC: 9.3 MG/DL (ref 8.3–10.1)
CHLORIDE SERPL-SCNC: 101 MMOL/L (ref 100–108)
CO2 SERPL-SCNC: 30 MMOL/L (ref 21–32)
CREAT SERPL-MCNC: 0.78 MG/DL (ref 0.6–1.3)
EOSINOPHIL # BLD AUTO: 0.13 THOUSAND/ΜL (ref 0–0.61)
EOSINOPHIL NFR BLD AUTO: 4 % (ref 0–6)
ERYTHROCYTE [DISTWIDTH] IN BLOOD BY AUTOMATED COUNT: 12.1 % (ref 11.6–15.1)
GFR SERPL CREATININE-BSD FRML MDRD: 83 ML/MIN/1.73SQ M
GLUCOSE P FAST SERPL-MCNC: 93 MG/DL (ref 65–99)
HCT VFR BLD AUTO: 40.9 % (ref 34.8–46.1)
HGB BLD-MCNC: 13.3 G/DL (ref 11.5–15.4)
IMM GRANULOCYTES # BLD AUTO: 0.01 THOUSAND/UL (ref 0–0.2)
IMM GRANULOCYTES NFR BLD AUTO: 0 % (ref 0–2)
INR PPP: 1 (ref 0.86–1.16)
LYMPHOCYTES # BLD AUTO: 0.76 THOUSANDS/ΜL (ref 0.6–4.47)
LYMPHOCYTES NFR BLD AUTO: 22 % (ref 14–44)
MCH RBC QN AUTO: 31.1 PG (ref 26.8–34.3)
MCHC RBC AUTO-ENTMCNC: 32.5 G/DL (ref 31.4–37.4)
MCV RBC AUTO: 96 FL (ref 82–98)
MONOCYTES # BLD AUTO: 0.3 THOUSAND/ΜL (ref 0.17–1.22)
MONOCYTES NFR BLD AUTO: 9 % (ref 4–12)
NEUTROPHILS # BLD AUTO: 2.17 THOUSANDS/ΜL (ref 1.85–7.62)
NEUTS SEG NFR BLD AUTO: 63 % (ref 43–75)
NRBC BLD AUTO-RTO: 0 /100 WBCS
PLATELET # BLD AUTO: 225 THOUSANDS/UL (ref 149–390)
PMV BLD AUTO: 9.8 FL (ref 8.9–12.7)
POTASSIUM SERPL-SCNC: 3.7 MMOL/L (ref 3.5–5.3)
PROT SERPL-MCNC: 7.3 G/DL (ref 6.4–8.2)
PROTHROMBIN TIME: 10.5 SECONDS (ref 9.4–11.7)
RBC # BLD AUTO: 4.28 MILLION/UL (ref 3.81–5.12)
SODIUM SERPL-SCNC: 139 MMOL/L (ref 136–145)
WBC # BLD AUTO: 3.42 THOUSAND/UL (ref 4.31–10.16)

## 2018-12-07 PROCEDURE — 85025 COMPLETE CBC W/AUTO DIFF WBC: CPT

## 2018-12-07 PROCEDURE — 85730 THROMBOPLASTIN TIME PARTIAL: CPT

## 2018-12-07 PROCEDURE — 85610 PROTHROMBIN TIME: CPT

## 2018-12-07 PROCEDURE — 80053 COMPREHEN METABOLIC PANEL: CPT

## 2018-12-07 PROCEDURE — 93005 ELECTROCARDIOGRAM TRACING: CPT

## 2018-12-07 PROCEDURE — 36415 COLL VENOUS BLD VENIPUNCTURE: CPT

## 2018-12-07 NOTE — PRE-PROCEDURE INSTRUCTIONS
My Surgical Experience    The following information was developed to assist you to prepare for your operation  What do I need to do before coming to the hospital?   Arrange for a responsible person to drive you to and from the hospital    Arrange care for your children at home  Children are not allowed in the recovery areas of the hospital   Plan to wear clothing that is easy to put on and take off  If you are having shoulder surgery, wear a shirt that buttons or zippers in the front  Bathing  o Shower the evening before and the morning of your surgery with an antibacterial soap  Please refer to the Pre Op Showering Instructions for Surgery Patients Sheet   o Remove nail polish and all body piercing jewelry  o Do not shave any body part for at least 24 hours before surgery-this includes face, arms, legs and upper body  Food  o Nothing to eat or drink after midnight the night before your surgery  This includes candy and chewing gum  o Exception: If your surgery is after 12:00pm (noon), you may have clear liquids such as 7-Up®, ginger ale, apple or cranberry juice, Jell-O®, water, or clear broth until 8:00 am  o Do not drink milk or juice with pulp on the morning before surgery  o Do not drink alcohol 24 hours before surgery  Medicine  o Follow instructions you received from your surgeon about which medicines you may take on the day of surgery  o If instructed to take medicine on the morning of surgery, take pills with just a small sip of water  Call your prescribing doctor for specific infroamtion on what to do if you take insulin    What should I bring to the hospital?    Bring:  [place] Jade or a walker, if you have them, for foot or knee surgery   A list of the daily medicines, vitamins, minerals, herbals and nutritional supplements you take   Include the dosages of medicines and the time you take them each day   Glasses, dentures or hearing aids   Minimal clothing; you will be wearing hospital sleepwear   Photo ID; required to verify your identity   If you have a Living Will or Power of , bring a copy of the documents   If you have an ostomy, bring an extra pouch and any supplies you use    Do not bring   Medicines or inhalers   Money, valuables or jewelry    What other information should I know about the day of surgery?  Notify your surgeons if you develop a cold, sore throat, cough, fever, rash or any other illness   Report to the Ambulatory Surgical/Same Day Surgery Unit   You will be instructed to stop at Registration only if you have not been pre-registered   Inform your  fi they do not stay that they will be asked by the staff to leave a phone number where they can be reached   Be available to be reached before surgery  In the event the operating room schedule changes, you may be asked to come in earlier or later than expected    *It is important to tell your doctor and others involved in your health care if you are taking or have been taking any non-prescription drugs, vitamins, minerals, herbals or other nutritional supplements  Any of these may interact with some food or medicines and cause a reaction      Pre-Surgery Instructions:   Medication Instructions    atenolol (TENORMIN) 100 mg tablet Instructed patient per Anesthesia Guidelines   cyclobenzaprine (FLEXERIL) 10 mg tablet Instructed patient per Anesthesia Guidelines   fluticasone (FLONASE) 50 mcg/act nasal spray Instructed patient per Anesthesia Guidelines   hydrochlorothiazide (HYDRODIURIL) 25 mg tablet Instructed patient per Anesthesia Guidelines   hydroxychloroquine (PLAQUENIL) 200 mg tablet Instructed patient per Anesthesia Guidelines   loratadine (CLARITIN) 10 mg tablet Instructed patient per Anesthesia Guidelines   meloxicam (MOBIC) 15 mg tablet Instructed patient per Anesthesia Guidelines   RESTASIS MULTIDOSE 0 05 % ophthalmic emulsion Instructed patient per Anesthesia Guidelines      traMADol (ULTRAM) 50 mg tablet Instructed patient per Anesthesia Guidelines  To take atenolol, plaquenil, and  Eye drops a m  Of surgery

## 2018-12-08 LAB
ATRIAL RATE: 59 BPM
P AXIS: 46 DEGREES
PR INTERVAL: 192 MS
QRS AXIS: -31 DEGREES
QRSD INTERVAL: 102 MS
QT INTERVAL: 440 MS
QTC INTERVAL: 435 MS
T WAVE AXIS: -5 DEGREES
VENTRICULAR RATE: 59 BPM

## 2018-12-08 PROCEDURE — 93010 ELECTROCARDIOGRAM REPORT: CPT | Performed by: INTERNAL MEDICINE

## 2018-12-17 ENCOUNTER — OFFICE VISIT (OUTPATIENT)
Dept: FAMILY MEDICINE CLINIC | Facility: CLINIC | Age: 58
End: 2018-12-17
Payer: COMMERCIAL

## 2018-12-17 VITALS
SYSTOLIC BLOOD PRESSURE: 130 MMHG | RESPIRATION RATE: 18 BRPM | BODY MASS INDEX: 38.45 KG/M2 | TEMPERATURE: 98 F | HEIGHT: 67 IN | HEART RATE: 79 BPM | DIASTOLIC BLOOD PRESSURE: 78 MMHG | WEIGHT: 245 LBS | OXYGEN SATURATION: 99 %

## 2018-12-17 DIAGNOSIS — T78.40XA ALLERGIC STATE, INITIAL ENCOUNTER: ICD-10-CM

## 2018-12-17 DIAGNOSIS — Z23 ENCOUNTER FOR IMMUNIZATION: ICD-10-CM

## 2018-12-17 DIAGNOSIS — Z01.818 PRE-OPERATIVE CLEARANCE: Primary | ICD-10-CM

## 2018-12-17 PROCEDURE — 99243 OFF/OP CNSLTJ NEW/EST LOW 30: CPT | Performed by: FAMILY MEDICINE

## 2018-12-17 RX ORDER — LORATADINE 10 MG/1
10 TABLET ORAL DAILY
Qty: 90 TABLET | Refills: 3 | Status: SHIPPED | OUTPATIENT
Start: 2018-12-17 | End: 2018-12-17 | Stop reason: SDUPTHER

## 2018-12-17 RX ORDER — LORATADINE 10 MG/1
10 TABLET ORAL DAILY
Qty: 90 TABLET | Refills: 3 | Status: SHIPPED | OUTPATIENT
Start: 2018-12-17 | End: 2020-02-13 | Stop reason: SDUPTHER

## 2018-12-19 DIAGNOSIS — R94.31 EKG ABNORMALITY: Primary | ICD-10-CM

## 2018-12-19 NOTE — PROGRESS NOTES
PRE-OPERATIVE EVALUATION  [facility]    NAME: Yolie Self  AGE: 59 y o  SEX: female  : 1960     DATE: 2018     Pre-Operative Evaluation      Chief Complaint: Pre-operative Evaluation     Surgery: Left arthroscopy with RTC repair  Anticipated Date of Surgery: 2019  Referring Provider: Self, Referral       History of Present Illness:     Yolie Self is a 59 y o  female who presents to the office today for a preoperative consultation at the request of surgeon, Surgeon who plans on performingLeft arthroscopy with RTC repair  Planned anesthesia is MAC/Isaiah  Patient has a bleeding risk of: no recent abnormal bleeding  Patient does not have objections to receiving blood products if needed  Current anti-platelet/anti-coagulation medications that the patient is prescribed includes: None  Assessment of Chronic Conditions:   - Hypertension: BP stable today     Assessment of Cardiac Risk:  · Denies unstable or severe angina or MI in the last 6 weeks or history of stent placement in the last year   · Denies decompensated heart failure (e g  New onset heart failure, NYHA functional class IV heart failure, or worsening existing heart failure)  · Denies significant arrhythmias such as high grade AV block, symptomatic ventricular arrhythmia, newly recognized ventricular tachycardia, supraventricular tachycardia with resting heart rate >100, or symptomatic bradycardia  · Denies severe heart valve disease including aortic stenosis or symptomatic mitral stenosis     Exercise Capacity:  · Able to walk 4 blocks without symptoms?: Yes  · Able to walk 2 flights without symptoms?: Yes    Prior Anesthesia Reactions: No     Personal history of venous thromboembolic disease? No    History of steroid use for >2 weeks within last year?  No         Review of Systems:   Review of Systems   Constitutional: Negative for activity change, appetite change, chills, diaphoresis, fatigue, fever and unexpected weight change  HENT: Negative for congestion, dental problem, drooling, ear discharge, ear pain, facial swelling, hearing loss, mouth sores, nosebleeds, postnasal drip, rhinorrhea, sinus pain and sinus pressure  Eyes: Negative for photophobia, pain, discharge, redness, itching and visual disturbance  Respiratory: Negative for apnea, cough, choking, chest tightness, shortness of breath, wheezing and stridor  Cardiovascular: Negative for chest pain, palpitations and leg swelling  Gastrointestinal: Negative for abdominal distention, abdominal pain, anal bleeding, blood in stool, constipation, diarrhea, nausea, rectal pain and vomiting  Endocrine: Negative for cold intolerance, heat intolerance, polydipsia, polyphagia and polyuria  Genitourinary: Negative for decreased urine volume, difficulty urinating, dyspareunia, dysuria, flank pain, hematuria and pelvic pain  Musculoskeletal: Negative for arthralgias, back pain, gait problem, joint swelling, myalgias, neck pain and neck stiffness  Skin: Negative for color change, pallor, rash and wound  Allergic/Immunologic: Negative for environmental allergies, food allergies and immunocompromised state  Neurological: Negative for dizziness, tremors, seizures, syncope, facial asymmetry, speech difficulty, weakness, light-headedness, numbness and headaches  Hematological: Negative for adenopathy  Does not bruise/bleed easily  Psychiatric/Behavioral: Negative for agitation, behavioral problems, confusion, decreased concentration, dysphoric mood, hallucinations, sleep disturbance and suicidal ideas  The patient is not nervous/anxious  Current Problem List:     Patient Active Problem List   Diagnosis    Complete tear of left rotator cuff       Allergies:      Allergies   Allergen Reactions    Pollen Extract Other (See Comments)     headaches       Medications:       Current Outpatient Prescriptions:     atenolol (TENORMIN) 100 mg tablet, take 1 tablet by mouth once daily, Disp: 30 tablet, Rfl: 6    cyclobenzaprine (FLEXERIL) 10 mg tablet, Take 1 tablet (10 mg total) by mouth 3 (three) times a day as needed for muscle spasms (Patient taking differently: Take 10 mg by mouth daily at bedtime  ), Disp: 90 tablet, Rfl: 6    fluticasone (FLONASE) 50 mcg/act nasal spray, 2 sprays into each nostril daily, Disp: 16 g, Rfl: 3    hydrochlorothiazide (HYDRODIURIL) 25 mg tablet, take 1 tablet by mouth once daily, Disp: 30 tablet, Rfl: 6    hydroxychloroquine (PLAQUENIL) 200 mg tablet, Take 200 mg by mouth 2 (two) times a day with meals, Disp: , Rfl: 0    loratadine (CLARITIN) 10 mg tablet, Take 1 tablet (10 mg total) by mouth daily, Disp: 90 tablet, Rfl: 3    meloxicam (MOBIC) 15 mg tablet, Take by mouth, Disp: , Rfl:     RESTASIS MULTIDOSE 0 05 % ophthalmic emulsion, Administer to both eyes every 12 (twelve) hours  , Disp: , Rfl:     traMADol (ULTRAM) 50 mg tablet, Take 1 tablet by mouth every 8 (eight) hours as needed  , Disp: , Rfl:     Past Medical History:       Past Medical History:   Diagnosis Date    Diverticulitis, colon     Hypertension     Hypothyroidism     h/o " borderline "  issues    Liver cyst     last assessed 4/18/16    Lyme disease     Positive Anaplasma serology     RA (rheumatoid arthritis) (HCC)     Seizure disorder in pregnancy ([address] )     had 1 seizure x1 1986 during pregnancy- none after    Varicose vein of leg         Past Surgical History:   Procedure Laterality Date    BACK SURGERY      fusion/refusion of 2-8 vertebrae    COLECTOMY      partial sigmoid, last assessed 4/18/16    JOINT REPLACEMENT      LAPAROSCOPY      with adnexectomy    LIVER SURGERY      REPLACEMENT TOTAL KNEE Bilateral     SINUS SURGERY      TONSILLECTOMY      TYMPANOSTOMY TUBE PLACEMENT          Family History   Problem Relation Age of Onset    Hypertension Mother     Arthritis Father     Hyperthyroidism Father     Hypertension Father         Social History     Social History    Marital status: /Civil Union     Spouse name: N/A    Number of children: N/A    Years of education: N/A     Occupational History    Not on file  Social History Main Topics    Smoking status: Never Smoker    Smokeless tobacco: Never Used    Alcohol use No    Drug use: No    Sexual activity: Not on file     Other Topics Concern    Not on file     Social History Narrative    Always uses seat belt        Physical Exam:     Vitals:    12/17/18 1000   BP: 130/78   Pulse: 79   Resp: 18   Temp: 98 °F (36 7 °C)   SpO2: 99%       Physical Exam   Constitutional: She is oriented to person, place, and time  She appears well-developed and well-nourished  No distress  HENT:   Head: Normocephalic and atraumatic  Right Ear: External ear normal    Left Ear: External ear normal    Nose: Nose normal    Mouth/Throat: Oropharynx is clear and moist  No oropharyngeal exudate  Eyes: Conjunctivae and EOM are normal  Pupils are equal, round, and reactive to light  Right eye exhibits no discharge  Left eye exhibits no discharge  No scleral icterus  Neck: Normal range of motion  Neck supple  No JVD present  No thyromegaly present  Cardiovascular: Normal rate, regular rhythm, normal heart sounds and intact distal pulses  Exam reveals no gallop and no friction rub  No murmur heard  Pulmonary/Chest: Effort normal and breath sounds normal  No respiratory distress  She has no wheezes  She has no rales  She exhibits no tenderness  Abdominal: Soft  Bowel sounds are normal  She exhibits no distension and no mass  There is no tenderness  There is no rebound and no guarding  Musculoskeletal: Normal range of motion  She exhibits no edema, tenderness or deformity  Lymphadenopathy:     She has no cervical adenopathy  Neurological: She is alert and oriented to person, place, and time  She has normal reflexes  She displays normal reflexes  No cranial nerve deficit  She exhibits normal muscle tone  Coordination normal    Skin: Skin is warm and dry  No rash noted  She is not diaphoretic  No erythema  Psychiatric: She has a normal mood and affect  Her behavior is normal  Judgment and thought content normal    Nursing note and vitals reviewed  Data:     Pre-operative work-up    Laboratory Results: I have personally reviewed the pertinent laboratory results/reports     EKG: I have personally reviewed pertinent reports  Previous cardiopulmonary studies within the past year: None    Assessment & Recommendations:     1  Pre-operative clearance      Pt is medically low risk for MAC anesthesia   2  Allergic state, initial encounter  loratadine (CLARITIN) 10 mg tablet    DISCONTINUED: loratadine (CLARITIN) 10 mg tablet    DISCONTINUED: loratadine (CLARITIN) 10 mg tablet   3  Encounter for immunization  SYRINGE/SINGLE-DOSE VIAL: influenza vaccine, [id number], quadrivalent, 0 5 mL, preservative-free (AFLURIA, FLUARIX, FLULAVAL, FLUZONE)       Pre-Op Evaluation Assessment  59 y o  female with planned surgery: Let arthroscopy with RTC repair  Known risk factors for perioperative complications: None  Cardiac Risk Estimation: per the Revised Cardiac Risk Index (Circ  100:1043, 1999), the patient's risk factors for cardiac complications include None, putting her in: RCI RISK CLASS I (0 risk factors, risk of major cardiac compl  appr  0 5%)  Current medications which may produce withdrawal symptoms if withheld perioperatively: None  Pre-Op Evaluation Plan  1  Further preoperative workup as follows:   - CBC, CMP, PT/INR done, EKG done    2  Medication Management/Recommendations:   - None, continue medication regimen including morning of surgery, with sip of water    3  Prophylaxis for cardiac events with perioperative beta-blockers: not indicated      4  Patient requires further consultation with: None    Clearance  Patient is CLEARED for surgery under MAC anesthesia without any additional cardiac testing        Gonsalo Laird MD  [facility] [address]  [facility] [address]  Phone#  [phone]  Fax#  [phone]

## 2019-01-02 DIAGNOSIS — M75.122 COMPLETE TEAR OF LEFT ROTATOR CUFF: Primary | ICD-10-CM

## 2019-01-03 ENCOUNTER — ANESTHESIA (OUTPATIENT)
Dept: PERIOP | Facility: HOSPITAL | Age: 59
End: 2019-01-03
Payer: COMMERCIAL

## 2019-01-03 ENCOUNTER — ANESTHESIA EVENT (OUTPATIENT)
Dept: PERIOP | Facility: HOSPITAL | Age: 59
End: 2019-01-03
Payer: COMMERCIAL

## 2019-01-03 ENCOUNTER — HOSPITAL ENCOUNTER (OUTPATIENT)
Facility: HOSPITAL | Age: 59
Setting detail: OUTPATIENT SURGERY
Discharge: HOME | End: 2019-01-03
Attending: ORTHOPAEDIC SURGERY | Admitting: ORTHOPAEDIC SURGERY
Payer: COMMERCIAL

## 2019-01-03 VITALS
TEMPERATURE: 97.3 F | BODY MASS INDEX: 38.37 KG/M2 | OXYGEN SATURATION: 100 % | SYSTOLIC BLOOD PRESSURE: 155 MMHG | WEIGHT: 245 LBS | DIASTOLIC BLOOD PRESSURE: 88 MMHG | HEART RATE: 72 BPM | RESPIRATION RATE: 18 BRPM

## 2019-01-03 PROCEDURE — 29828 SHO ARTHRS SRG BICP TENODSIS: CPT | Performed by: ORTHOPAEDIC SURGERY

## 2019-01-03 PROCEDURE — 29826 SHO ARTHRS SRG DECOMPRESSION: CPT | Performed by: PHYSICIAN ASSISTANT

## 2019-01-03 PROCEDURE — 29827 SHO ARTHRS SRG RT8TR CUF RPR: CPT | Performed by: ORTHOPAEDIC SURGERY

## 2019-01-03 PROCEDURE — C9290 INJ, BUPIVACAINE LIPOSOME: HCPCS | Performed by: STUDENT IN AN ORGANIZED HEALTH CARE EDUCATION/TRAINING PROGRAM

## 2019-01-03 PROCEDURE — 29827 SHO ARTHRS SRG RT8TR CUF RPR: CPT | Performed by: PHYSICIAN ASSISTANT

## 2019-01-03 PROCEDURE — 29826 SHO ARTHRS SRG DECOMPRESSION: CPT | Performed by: ORTHOPAEDIC SURGERY

## 2019-01-03 PROCEDURE — C1713 ANCHOR/SCREW BN/BN,TIS/BN: HCPCS | Performed by: ORTHOPAEDIC SURGERY

## 2019-01-03 PROCEDURE — 29828 SHO ARTHRS SRG BICP TENODSIS: CPT | Performed by: PHYSICIAN ASSISTANT

## 2019-01-03 DEVICE — SYSTEM IMPLANT SPEEDBRIDGE W/4.75 BCMPS SWIVELOCK C: Type: IMPLANTABLE DEVICE | Site: SHOULDER | Status: FUNCTIONAL

## 2019-01-03 RX ORDER — MEPERIDINE HYDROCHLORIDE 25 MG/ML
12.5 INJECTION INTRAMUSCULAR; INTRAVENOUS; SUBCUTANEOUS
Status: DISCONTINUED | OUTPATIENT
Start: 2019-01-03 | End: 2019-01-03 | Stop reason: HOSPADM

## 2019-01-03 RX ORDER — LABETALOL HYDROCHLORIDE 5 MG/ML
10 INJECTION, SOLUTION INTRAVENOUS AS NEEDED
Status: DISCONTINUED | OUTPATIENT
Start: 2019-01-03 | End: 2019-01-03 | Stop reason: HOSPADM

## 2019-01-03 RX ORDER — MIDAZOLAM HYDROCHLORIDE 1 MG/ML
INJECTION INTRAMUSCULAR; INTRAVENOUS AS NEEDED
Status: DISCONTINUED | OUTPATIENT
Start: 2019-01-03 | End: 2019-01-03 | Stop reason: SURG

## 2019-01-03 RX ORDER — CEFAZOLIN SODIUM 2 G/50ML
2000 SOLUTION INTRAVENOUS ONCE
Status: COMPLETED | OUTPATIENT
Start: 2019-01-03 | End: 2019-01-03

## 2019-01-03 RX ORDER — FENTANYL CITRATE 50 UG/ML
INJECTION, SOLUTION INTRAMUSCULAR; INTRAVENOUS AS NEEDED
Status: DISCONTINUED | OUTPATIENT
Start: 2019-01-03 | End: 2019-01-03 | Stop reason: SURG

## 2019-01-03 RX ORDER — PROMETHAZINE HYDROCHLORIDE 25 MG/ML
12.5 INJECTION, SOLUTION INTRAMUSCULAR; INTRAVENOUS ONCE AS NEEDED
Status: DISCONTINUED | OUTPATIENT
Start: 2019-01-03 | End: 2019-01-03 | Stop reason: HOSPADM

## 2019-01-03 RX ORDER — ONDANSETRON 2 MG/ML
4 INJECTION INTRAMUSCULAR; INTRAVENOUS ONCE AS NEEDED
Status: DISCONTINUED | OUTPATIENT
Start: 2019-01-03 | End: 2019-01-03 | Stop reason: HOSPADM

## 2019-01-03 RX ORDER — EPHEDRINE SULFATE 50 MG/ML
INJECTION, SOLUTION INTRAVENOUS AS NEEDED
Status: DISCONTINUED | OUTPATIENT
Start: 2019-01-03 | End: 2019-01-03 | Stop reason: SURG

## 2019-01-03 RX ORDER — SODIUM CHLORIDE, SODIUM LACTATE, POTASSIUM CHLORIDE, CALCIUM CHLORIDE 600; 310; 30; 20 MG/100ML; MG/100ML; MG/100ML; MG/100ML
125 INJECTION, SOLUTION INTRAVENOUS CONTINUOUS
Status: DISCONTINUED | OUTPATIENT
Start: 2019-01-03 | End: 2019-01-03 | Stop reason: HOSPADM

## 2019-01-03 RX ORDER — FENTANYL CITRATE/PF 50 MCG/ML
50 SYRINGE (ML) INJECTION
Status: DISCONTINUED | OUTPATIENT
Start: 2019-01-03 | End: 2019-01-03 | Stop reason: HOSPADM

## 2019-01-03 RX ORDER — ONDANSETRON 2 MG/ML
INJECTION INTRAMUSCULAR; INTRAVENOUS AS NEEDED
Status: DISCONTINUED | OUTPATIENT
Start: 2019-01-03 | End: 2019-01-03 | Stop reason: SURG

## 2019-01-03 RX ORDER — PROPOFOL 10 MG/ML
INJECTION, EMULSION INTRAVENOUS AS NEEDED
Status: DISCONTINUED | OUTPATIENT
Start: 2019-01-03 | End: 2019-01-03 | Stop reason: SURG

## 2019-01-03 RX ORDER — LIDOCAINE HYDROCHLORIDE 10 MG/ML
INJECTION, SOLUTION INFILTRATION; PERINEURAL AS NEEDED
Status: DISCONTINUED | OUTPATIENT
Start: 2019-01-03 | End: 2019-01-03 | Stop reason: SURG

## 2019-01-03 RX ORDER — GLYCOPYRROLATE 0.2 MG/ML
INJECTION INTRAMUSCULAR; INTRAVENOUS AS NEEDED
Status: DISCONTINUED | OUTPATIENT
Start: 2019-01-03 | End: 2019-01-03 | Stop reason: SURG

## 2019-01-03 RX ORDER — HYDROMORPHONE HCL/PF 1 MG/ML
0.4 SYRINGE (ML) INJECTION
Status: DISCONTINUED | OUTPATIENT
Start: 2019-01-03 | End: 2019-01-03 | Stop reason: HOSPADM

## 2019-01-03 RX ADMIN — MIDAZOLAM HYDROCHLORIDE 1 MG: 1 INJECTION, SOLUTION INTRAMUSCULAR; INTRAVENOUS at 07:43

## 2019-01-03 RX ADMIN — EPHEDRINE SULFATE 10 MG: 50 INJECTION, SOLUTION INTRAMUSCULAR; INTRAVENOUS; SUBCUTANEOUS at 08:43

## 2019-01-03 RX ADMIN — EPHEDRINE SULFATE 10 MG: 50 INJECTION, SOLUTION INTRAMUSCULAR; INTRAVENOUS; SUBCUTANEOUS at 08:33

## 2019-01-03 RX ADMIN — CEFAZOLIN SODIUM 2000 MG: 2 SOLUTION INTRAVENOUS at 08:11

## 2019-01-03 RX ADMIN — PROPOFOL 180 MG: 10 INJECTION, EMULSION INTRAVENOUS at 08:16

## 2019-01-03 RX ADMIN — ONDANSETRON 4 MG: 2 INJECTION INTRAMUSCULAR; INTRAVENOUS at 08:19

## 2019-01-03 RX ADMIN — FENTANYL CITRATE 50 MCG: 50 INJECTION, SOLUTION INTRAMUSCULAR; INTRAVENOUS at 07:39

## 2019-01-03 RX ADMIN — LIDOCAINE HYDROCHLORIDE 50 MG: 10 INJECTION, SOLUTION INFILTRATION; PERINEURAL at 08:16

## 2019-01-03 RX ADMIN — DEXAMETHASONE SODIUM PHOSPHATE 4 MG: 10 INJECTION INTRAMUSCULAR; INTRAVENOUS at 08:19

## 2019-01-03 RX ADMIN — FENTANYL CITRATE 50 MCG: 50 INJECTION, SOLUTION INTRAMUSCULAR; INTRAVENOUS at 07:43

## 2019-01-03 RX ADMIN — SODIUM CHLORIDE, SODIUM LACTATE, POTASSIUM CHLORIDE, AND CALCIUM CHLORIDE: .6; .31; .03; .02 INJECTION, SOLUTION INTRAVENOUS at 07:34

## 2019-01-03 RX ADMIN — MIDAZOLAM HYDROCHLORIDE 1 MG: 1 INJECTION, SOLUTION INTRAMUSCULAR; INTRAVENOUS at 07:39

## 2019-01-03 RX ADMIN — GLYCOPYRROLATE 0.2 MG: 0.2 INJECTION, SOLUTION INTRAMUSCULAR; INTRAVENOUS at 08:19

## 2019-01-03 NOTE — OP NOTE
OPERATIVE REPORT  PATIENT NAME: Rola Grubbs    :  1960  MRN: [id number]  Pt Location: [facility] OR ROOM 03    SURGERY DATE: 1/3/2019    Surgeon(s) and Role:     * Erinn Biswas MD - Primary     * Feroz Duncan PA-C - Assisting necessary for the procedure for assistance with minimally invasive arthroscopic techniques for rotator cuff repair and biceps tenodesis and subacromial decompression    Preop Diagnosis:  Complete tear of left rotator cuff [M75 122]    Post-Op Diagnosis Codes:     * Complete tear of left rotator cuff [M75 122] and long head of biceps tendon tear as well as subacromial impingement    Procedure:  Left shoulder arthroscopy with rotator cuff repair utilizing speed bridge technique with 4 anchors and 7 sutures as well as biceps tenodesis utilizing 1 of the speed bridge anchors and 2 sutures and subacromial decompression    Specimen(s):  * No specimens in log *    Estimated Blood Loss:   Minimal    Drains:       Anesthesia Type:   Regional with general    Operative Indications:  Complete tear of left rotator cuff [M75 122]  Triny is a 67-year-old female who has been suffering long-term with left shoulder pain  MRI demonstrated rotator cuff tear  She had failed conservative measures and was having difficulty with activities of daily living and wished to undergo a left shoulder arthroscopy with rotator cuff repair  She understood the risks and benefits of that procedure wished to go ahead  The risks are inclusive of but not limited to infection, stiffness, worsening of symptoms, failure to regain full strength and ability, recurrent tear, failure to achieve anticipated results, and need for further surgery  Operative Findings:  Left shoulder with a stable exam under anesthesia achieving forward flexion and abduction of 160°  External rotation was to 70° internal rotation to 60°    Intra-articular findings demonstrated good appearance of articular cartilage with no loose bodies in the axillary pouch  There was a full-thickness large tear of the supraspinatus tendon with some retraction  The subscapularis tendon is intact  Long head of biceps tendon had high-grade partial tearing particular in the region of the rotator cuff tear and thus required a tenodesis  There was also type 3 subacromial spur that was found requiring acromioplasty for decompression  We did repair the rotator cuff and tenodesed the long head of biceps tendon  We had a stable repair of both  Complications:   None    Procedure and Technique:  Triny was taken to the operating room and placed supine on the OR table  She was given preoperative IV antibiotics and preoperative regional anesthesia by the attending anesthesiologist   General anesthesia was induced and the patient was taken into the beach chair position with all parts well padded and the head in neutral position head anthony  Left upper extremity was prepped and draped in usual sterile fashion after exam under anesthesia as described above  We took a surgical time-out  We did create a posterior portal with 11 blade  Diagnostic arthroscopy begun an anterior portal was made in the rotator interval with 11 blade  We demonstrated good appearance of articular cartilage in the glenohumeral joint with no loose bodies in the axillary pouch  The subscapularis tendon was intact  Long head biceps tendon had a high-grade partial tear requiring tenodesis  Also demonstrated a very large supraspinatus tendon tear with some retraction that was full-thickness  We did debride this back to a stable rim of tissue utilizing a shaver  We also debrided the greater tuberosity to a bleeding bed of bone with a bur  We did create the lateral portal with an 11 blade  The did tag the long head of the biceps through that portal utilizing the suture Passer and a FiberWire suture in a mattress fashion    We were then able to tenotomized the long head of biceps utilizing the Sandy   We then into the subacromial space and found a type 3 subacromial spur  We did decompress this region for acromioplasty utilizing the bur  We then began our double row repair  We placed an anterior medial anchor and passed the FiberTape and FiberWire sutures from that anchor and a mattress configuration  We then placed a 2nd medial row anchor posterior to the 1st passed the FiberTape and FiberWire sutures from that anchor and a mattress configuration  We then took 1 FiberTape suture from each medial row anchor and placed an anterior lateral row anchor  This gave us excellent coverage of the greater tuberosity and good fixation  We then took the FiberWire suture from that anchor and placed a horizontal mattress stitch into the long head of biceps tendon tied that down for fixation of the tenodesis  We then tied 1 limb from that knot stack to 1 limb of the tag suture of the biceps  This is for additional fixation  We did also utilized the other limb of each suture to tie once again for additional fixation  We did remove all excess suture from the long head of biceps tenodesis  We then completed our rotator cuff repair by taking the main to FiberTape sutures placing them into a posterior lateral row anchor  This gave us excellent fixation  We then took the FiberWire suture from that anchor and passed that into the posterior aspect of the rotator cuff and tied that down with arthroscopic knot tying techniques  We then tied the medial row FiberWire sutures with arthroscopic knot tying techniques  We felt that we had an excellent fixation onto a bleeding bed of bone with a double row technique  We were quite pleased with our repair and removed the arthroscopic equipment  We closed the portals with 4-0 nylon suture  Dry, sterile dressings were applied with a sling    She tolerated the procedure well and transferred to recovery room stable condition which will follow up me in 1 week for suture removal   She will be on the rotator cuff repair combined with biceps tenodesis rehabilitation protocol     I was present for the entire procedure and A qualified resident physician was not available    Patient Disposition:  PACU     SIGNATURE: Erinn Biswas MD  DATE: January 3, 2019  TIME: 9:20 AM

## 2019-01-03 NOTE — DISCHARGE INSTRUCTIONS
Instruction Sheet Following RTC repair     Sling:   Wear your sling at all times after your surgery (this includes sleeping), except for when you are doing pendulum exercises, showering, or physical therapy  Additionally, you should not carry anything heavier than a pencil in your hand  Dressing:   Remove all cotton and yellow gauze 48 hours after your surgery  You do not need to put a new dressing on your wound; place Band-Aids on your wound  Showering: You may shower 48 hours after surgery  Please use CAUTION!! Be careful not to slip and fall  The effects of anesthesia and/or medication may make you drowsy or light-headed  Do not soak in a bathtub, hot tub, or pool until the doctor tells you it is O K , to do so  Once you are done showering pat the wound dry and apply a Band-Aid  While in the shower you must keep the arm across the front of the body as if it were still in the sling  Sleeping:   You will most likely have difficulty sleeping in the first few weeks after surgery  Most people find it more comfortable to sleep in a reclining position  You can either sleep in a recliner chair or create this position with pillows  Ice:   You can ice the shoulder to reduce swelling and discomfort  Do not ice the shoulder more than 20 minutes at a time  Let the shoulder warm up before reapplication  Avoid getting you wound wet  If you have a Cryocuff you may keep this on continuously  Follow-up visit:   You need to see the doctor about one week following surgery for your first post-op visit  At that time your sutures (stitches) will be removed  You will be given a prescription to begin physical therapy if you were not already given one  Common concerns:   Bruising and/or swelling of the shoulder, arm, or hand are common after surgery  To relieve this discomfort it is best to ice the shoulder  Please call if:   1  Any oozing or redness of the wound, fevers (>101 3°F), or chills       2  Any difficulty breathing or heaviness in the chest      REMEMBER - these are only guidelines for what to expect  If you have any questions or concerns, please do not hesitate to call the office  [phone]

## 2019-01-03 NOTE — ANESTHESIA PROCEDURE NOTES
Peripheral Block    Patient location during procedure: pre-op  Start time: 1/3/2019 7:47 AM  Reason for block: at surgeon's request and post-op pain management  Staffing  Anesthesiologist: Erasmo Sloan DO  Performed: anesthesiologist   Preanesthetic Checklist  Completed: patient identified, site marked, surgical consent, pre-op evaluation, timeout performed, IV checked, risks and benefits discussed and monitors and equipment checked  Peripheral Block  Patient position: supine  Prep: ChloraPrep  Patient monitoring: heart rate, continuous pulse ox and frequent blood pressure checks  Block type: interscalene  Laterality: left  Injection technique: single-shot  Procedures: ultrasound guided  Ultrasound permanent image saved  Local infiltration: bupivacaine  Infiltration strength: 0 5 %  Dose: 15 mL  Needle  Needle type: Stimuplex   Needle gauge: 22 G  Needle length: 5 cm  Needle localization: ultrasound guidance  Assessment  Injection assessment: incremental injection  Paresthesia pain: none  Heart rate change: no  Slow fractionated injection: yes  Post-procedure:  site cleaned  patient tolerated the procedure well with no immediate complications

## 2019-01-03 NOTE — ANESTHESIA PREPROCEDURE EVALUATION
Review of Systems/Medical History          Cardiovascular  Hypertension controlled,    Pulmonary       GI/Hepatic            Endo/Other  History of thyroid disease , hypothyroidism,      GYN       Hematology   Musculoskeletal  Rheumatoid arthritis ,   Arthritis     Neurology   Psychology           Physical Exam    Airway    Mallampati score: I         Dental       Cardiovascular  Rhythm: regular, Rate: normal,     Pulmonary      Other Findings        Anesthesia Plan  ASA Score- 3     Anesthesia Type- general and regional with ASA Monitors  Additional Monitors:   Airway Plan: LMA  Plan Factors-    Induction- intravenous  Postoperative Plan-     Informed Consent- Anesthetic plan and risks discussed with patient  I personally reviewed this patient with the CRNA  Discussed and agreed on the Anesthesia Plan with the THAI Walker

## 2019-01-03 NOTE — ANESTHESIA POSTPROCEDURE EVALUATION
Post-Op Assessment Note      CV Status:  Stable    Mental Status:  Alert    Hydration Status:  Stable and euvolemic    PONV Controlled:  None    Airway Patency:  Patent    Post Op Vitals Reviewed: Yes          Staff: CRNA       Comments: vss          BP (P) 168/85 (01/03/19 0930)    Temp (!) (P) 97 3 °F (36 3 °C) (01/03/19 0930)    Pulse (P) 62 (01/03/19 0930)   Resp (P) 18 (01/03/19 0930)    SpO2 (P) 100 % (01/03/19 0930)

## 2019-01-03 NOTE — H&P
Assessment/Plan:  Left shoulder rotator cuff tear  The patient would like to proceed with left shoulder arthroscopy with rotator cuff repair and subacromial decompression  We discussed the procedure and risks at length, including but not limited to, infection, bleeding, wound issues, nerve injury, blood clot, stiffness, failure of procedure, and need for additional surgery  We will see the patient back at the time of surgery  Subjective:   Rosalina Gómez is a 59 y o  female with left shoulder rotator cuff tear confirmed by MRI who notes ongoing pain and weakness about her shoulder  Review of Systems   Constitutional: Negative for chills, fever and unexpected weight change  HENT: Negative for hearing loss, nosebleeds and sore throat  Eyes: Negative for pain, redness and visual disturbance  Respiratory: Negative for cough, shortness of breath and wheezing  Cardiovascular: Negative for chest pain, palpitations and leg swelling  Gastrointestinal: Negative for abdominal pain, nausea and vomiting  Endocrine: Negative for polydipsia and polyuria  Genitourinary: Negative for dysuria and hematuria  Musculoskeletal:        See HPI   Skin: Negative for rash and wound  Neurological: Negative for dizziness, numbness and headaches  Psychiatric/Behavioral: Negative for decreased concentration and suicidal ideas  The patient is not nervous/anxious            Past Medical History:   Diagnosis Date    Diverticulitis, colon     Hypertension     Hypothyroidism     h/o " borderline "  issues    Liver cyst     last assessed 4/18/16    Lyme disease     Positive Anaplasma serology     RA [facility]) [facility] )     Seizure disorder in pregnancy ([facility] )     had 1 seizure x1 1986 during pregnancy- none after    Varicose vein of leg        Past Surgical History:   Procedure Laterality Date    BACK SURGERY      fusion/refusion of 2-8 vertebrae    COLECTOMY      partial sigmoid, last assessed 4/18/16    JOINT REPLACEMENT      LAPAROSCOPY      with adnexectomy    LIVER SURGERY      REPLACEMENT TOTAL KNEE Bilateral     SINUS SURGERY      TONSILLECTOMY      TYMPANOSTOMY TUBE PLACEMENT         Family History   Problem Relation Age of Onset    Hypertension Mother     Arthritis Father     Hyperthyroidism Father     Hypertension Father        Social History     Occupational History    Not on file  Social History Main Topics    Smoking status: Never Smoker    Smokeless tobacco: Never Used    Alcohol use No    Drug use: No    Sexual activity: Not on file         Current Facility-Administered Medications:     bupivacaine liposomal (EXPAREL) 1 3 % injection 20 mL, 20 mL, Infiltration, Once, Mario Solo DO    ceFAZolin (ANCEF) IVPB (premix) 2,000 mg, 2,000 mg, Intravenous, Once, Jason Media, PA-C    lactated ringers infusion, 125 mL/hr, Intravenous, Continuous, Mario Solo DO    Allergies   Allergen Reactions    Pollen Extract Other (See Comments)     headaches       Objective:  Vitals:    01/03/19 0655   BP: 165/95   Pulse: 62   Resp: 16   Temp: (!) 97 4 °F (36 3 °C)   SpO2: 96%       Ortho Exam    Physical Exam   Constitutional: She is oriented to person, place, and time  She appears well-developed and well-nourished  No distress  HENT:   Head: Normocephalic and atraumatic  Eyes: Conjunctivae and EOM are normal  No scleral icterus  Neck: No JVD present  Cardiovascular: Intact distal pulses  Pulmonary/Chest: Effort normal  No respiratory distress  Abdominal: Soft  She exhibits no distension  Neurological: She is alert and oriented to person, place, and time  Coordination normal    Skin: Skin is warm  Psychiatric: She has a normal mood and affect  Left shoulder: Positive empty can test  4/5 strength supraspinatus

## 2019-01-07 ENCOUNTER — TELEPHONE (OUTPATIENT)
Dept: OBGYN CLINIC | Facility: CLINIC | Age: 59
End: 2019-01-07

## 2019-01-07 NOTE — TELEPHONE ENCOUNTER
Dr Alena Huizar    Patient left a voicemail today @ 12:25pm    She stated that she is fine after her surgery and is having no complications  She will see you Friday at her appointment

## 2019-01-11 ENCOUNTER — OFFICE VISIT (OUTPATIENT)
Dept: OBGYN CLINIC | Facility: CLINIC | Age: 59
End: 2019-01-11

## 2019-01-11 VITALS
SYSTOLIC BLOOD PRESSURE: 137 MMHG | HEART RATE: 62 BPM | DIASTOLIC BLOOD PRESSURE: 83 MMHG | BODY MASS INDEX: 38.14 KG/M2 | WEIGHT: 243 LBS | HEIGHT: 67 IN

## 2019-01-11 DIAGNOSIS — Z98.890 STATUS POST LEFT ROTATOR CUFF REPAIR: ICD-10-CM

## 2019-01-11 DIAGNOSIS — M75.122 COMPLETE TEAR OF LEFT ROTATOR CUFF: Primary | ICD-10-CM

## 2019-01-11 PROCEDURE — 99024 POSTOP FOLLOW-UP VISIT: CPT | Performed by: ORTHOPAEDIC SURGERY

## 2019-01-11 RX ORDER — OXYCODONE HYDROCHLORIDE AND ACETAMINOPHEN 5; 325 MG/1; MG/1
1 TABLET ORAL EVERY 4 HOURS PRN
Refills: 0 | COMMUNITY
Start: 2019-01-03 | End: 2019-02-27

## 2019-01-11 NOTE — PROGRESS NOTES
Patient Name:  Iris An  MRN:  [id number]    Assessment     1  Complete tear of left rotator cuff     2  Status post left rotator cuff repair         Plan     1  Between is doing as expected a week status post left shoulder rotator cuff repair, subacromial decompression, biceps tenodesis  She does demonstrate normal sensation into the wrist and hand  She does demonstrate 2+ radial pulse  Portal sites to not demonstrate any erythema or signs of infection  She is start physical therapy he in 1 week's time  She is to remain in the sling and minimum of 5 more weeks  I did note to her that I would like her to continue with recovering from her left shoulder for a minimum of 3 months prior to undergoing treatment for her right shoulder in regards to x-rays and MR I  That she is experiencing similar symptoms in the right shoulder  I did note to her that would like her to do her home exercises to help strengthen her right shoulder as she progresses through therapy with her left shoulder  I would like see her back in 6 weeks for repeat evaluation  Katherine Agosto is a 68-year-old female who is 1 week status post left rotator cuff repair, subacromial decompression, and biceps tenodesis  She states she is doing well overall and is only experiencing intermittent and moderate soreness about the anterior and posterior aspect of her shoulders  This pain can radiate distally into the biceps area  She states that she was using her hand to open a bottle and was experiencing soreness into the biceps region  She denies any unusual pains, fevers, or chills  She also notes that her sensation is intact into her upper extremity  Objective     /83   Pulse 62   Ht 5' 7" (1 702 m)   Wt 110 kg (243 lb)   BMI 38 06 kg/m²     Portal sites are clean dry intact no erythema or signs of infection  She does demonstrate mild point tenderness at the portal sites    She does have normal sensation into the upper arm, elbow, forearm, wrist and hand  She does demonstrate 2+ radial pulse            Scribe Attestation    I,:   Lincoln Perez am acting as a scribe while in the presence of the attending physician :        I,:   Ghazala Villa MD personally performed the services described in this documentation    as scribed in my presence :

## 2019-01-17 ENCOUNTER — EVALUATION (OUTPATIENT)
Dept: PHYSICAL THERAPY | Facility: CLINIC | Age: 59
End: 2019-01-17
Payer: COMMERCIAL

## 2019-01-17 DIAGNOSIS — M75.122 COMPLETE TEAR OF LEFT ROTATOR CUFF: ICD-10-CM

## 2019-01-17 PROCEDURE — G8984 CARRY CURRENT STATUS: HCPCS | Performed by: PHYSICAL THERAPIST

## 2019-01-17 PROCEDURE — G8985 CARRY GOAL STATUS: HCPCS | Performed by: PHYSICAL THERAPIST

## 2019-01-17 PROCEDURE — 97161 PT EVAL LOW COMPLEX 20 MIN: CPT | Performed by: PHYSICAL THERAPIST

## 2019-01-17 NOTE — PROGRESS NOTES
PT Evaluation     Today's date: 2019  Patient name: Abner Kelly  : 1960  MRN: [id number]  Referring provider: Brenna Gonsalves MD  Dx:   Encounter Diagnosis     ICD-10-CM    1  Complete tear of left rotator cuff M75 122 Ambulatory referral to Physical Therapy                  Assessment  Assessment details: Abner Kelly is a 59 y o  female who presents with pain, decreased strength, decreased ROM and decreased joint mobility  Due to these impairments, patient has difficulty performing ADL's, recreational activities, engaging in social activities, lifting/carrying, reaching  Patient's clinical presentation is consistent with their referring diagnosis of Complete tear of left rotator cuff with bicep tenodesis and SAD  Plan: Ambulatory referral to Physical Therapy  Patient has been educated in post-op contraindications / precautions, home exercise program and plan of care  Patient would benefit from skilled physical therapy services to address their aforementioned functional limitations and progress towards prior level of function and independence with home exercise program      Impairments: abnormal or restricted ROM, activity intolerance, impaired physical strength, lacks appropriate home exercise program, pain with function, scapular dyskinesis and poor posture   Functional limitations: sleep disturbed, inability to reach over head, out to side or behind plane of bodyBarriers to therapy: Pt plans to be out of town for the first 2 weeks of February  Pt aggravated her shoulder while in the shower - accidentally reached forward a little and felt "twinge" and pop  Understanding of Dx/Px/POC: good   Prognosis: good    Goals  Short Term Goals to be met in 4 weeks (19)  1  Pt to be independent w/ prelimary HEP  2  PROM L shoulder to be within 10 degrees of R shoulder  3  Improve elbow L AROM to full extension    4  Improve AROM flexion and abduction to 135 or better to improve subjective function by at least 25%  Mid Term Goals to be met in 12 weeks (19)  1  AROM L shoulder to be within 5 degrees of R shoulder w/o pain to allow ease w/ ADL's and IADL's  2  Improve strength to 4-/5 or better to allow reaching and light carrying  3  Undisturbed sleep  4  Pt to resume driving  Long term goals to be set at later date  Plan  Plan details:       Patient would benefit from: PT eval and skilled physical therapy  Planned modality interventions: cryotherapy, thermotherapy: hydrocollator packs and unattended electrical stimulation  Planned therapy interventions: manual therapy, neuromuscular re-education, therapeutic exercise, therapeutic activities, home exercise program, stretching, patient education and postural training  Frequency: 2x week (2-3x/week)  Duration in weeks: 12  Plan of Care expiration date: 2019  Treatment plan discussed with: patient        Subjective Evaluation    History of Present Illness  Date of surgery: 1/3/2019  Mechanism of injury: Pt's had years of shoulder pain L>R  She had pain/difficulty with overhead/forward reaching, carrying, grooming or letting arm hang unsupported  Prior to surgery, pain was intermittent, worse at night 8/10 (limiting her sleep) and with movement  Pt takes Meloxicam w/ some benefit  PMH is significant for RA, L4-5 fusion, B/L TKA, multi-cystic liver disease, diverticulitis, cervical disc herniations  She accidentally reached a little in forward direction while in the shower 7pm 19 - and heard a pop/felt a "twinge" and has had more pain since then  Pain  At best pain ratin  At worst pain ratin  Location: anterior shoulder, scapula, lateral/anterior UE          Objective    Flowsheet Rows      Most Recent Value   PT/OT G-Codes   Current Score  28   Projected Score  63        Objective    Posture: Pt wears abduction sling      AROM stand/PROM supine R  L      19      A/PROM PROM  Shoulder flex 160/170* 100*  Shoulder abd        150/160* 80*  Functional ER   90/90  [email or web address]  Functional IR   T11/55  [email or web address]    MMT:   R  L     1/17/19 1/17/19  Shoulder flex        4+/5  NT  Shoulder abd       4/5*  NT  ER @neutral  4/5  NT  IR @neutral  5/5  NT      Cervical AROM loss:     1/17/19  Flex   nil   Ext   Mod/clarita  R rot   nil  L rot   nil  R SB   clarita  L SB   clarita  Retraction  min      Precautions: LHB tenodesis & SAD w/ RC repair 1/3/19; RA, L4-5 fusion, B/L TKA, multi-cystic liver disease, diverticulitis, cervical disc herniations      Daily Treatment Diary   Date  Visit # 1/17/19  1       Manual        PROM L shoulder 10'                       There Exer         pendulems 20x each       Table slide flex 5"x15       Stand cane ER neutral 5"x15       Backward shoulder rolls 15x       Sup cane ER        Table Abd                                        HEP        NMRed                                                Modalities                                  HEP = pendulems, table slide flexion, stand cane ER neutral, backward shoulder rolls

## 2019-01-21 ENCOUNTER — OFFICE VISIT (OUTPATIENT)
Dept: PHYSICAL THERAPY | Facility: CLINIC | Age: 59
End: 2019-01-21
Payer: COMMERCIAL

## 2019-01-21 DIAGNOSIS — M75.122 COMPLETE TEAR OF LEFT ROTATOR CUFF: Primary | ICD-10-CM

## 2019-01-21 PROCEDURE — 97110 THERAPEUTIC EXERCISES: CPT | Performed by: PHYSICAL THERAPIST

## 2019-01-21 PROCEDURE — 97140 MANUAL THERAPY 1/> REGIONS: CPT | Performed by: PHYSICAL THERAPIST

## 2019-01-21 NOTE — PROGRESS NOTES
Daily Note     Today's date: 2019  Patient name: Prudencio Arce  : 1960  MRN: [id number]  Referring provider: Deng Bond MD  Dx:   Encounter Diagnosis     ICD-10-CM    1  Complete tear of left rotator cuff M75 122                   Subjective: Pt states she tolerated her first session well  She reports table slide flexion exercise is "the worst one"  Objective: See treatment diary below  Precautions: LHB tenodesis & SAD w/ RC repair 1/3/19; RA, L4-5 fusion, B/L TKA, multi-cystic liver disease, diverticulitis, cervical disc herniations  Daily Treatment Diary   Date  Visit # 19  1 19  2      Manual        PROM L shoulder 10' 10'                      There Exer  20'       pendulems 20x each 30x each      Table slide flex 5"x15 5"x20      Stand cane ER neutral 5"x15 5"x20      Backward shoulder rolls 15x 30x      Sup cane ER 90/90  5'x20      Table Abd  5'x20                                      HEP        NMRed                                                Modalities                                  Updated HEP: added table slide abd and supine cane ER @ 90/90, pt already doing pendulems, backward rolls, table slide flexion and stand cane ER in neutral    Assessment: Tolerated treatment well  Patient demonstrates significant PROM advancement from last session  Plan: Continue per plan of care

## 2019-01-25 ENCOUNTER — OFFICE VISIT (OUTPATIENT)
Dept: PHYSICAL THERAPY | Facility: CLINIC | Age: 59
End: 2019-01-25
Payer: COMMERCIAL

## 2019-01-25 DIAGNOSIS — M75.122 COMPLETE TEAR OF LEFT ROTATOR CUFF: Primary | ICD-10-CM

## 2019-01-25 PROCEDURE — 97110 THERAPEUTIC EXERCISES: CPT

## 2019-01-25 NOTE — PROGRESS NOTES
Daily Note     Today's date: 2019  Patient name: Davy Perez  : 1960  MRN: [id number]  Referring provider: Teresa Garza MD  Dx:   Encounter Diagnosis     ICD-10-CM    1  Complete tear of left rotator cuff M75 122               Subjective: Pt states pain levels remain minimal  Felt good after last visit  Objective: See treatment diary below    Precautions: LHB tenodesis & SAD w/ RC repair 1/3/19; RA, L4-5 fusion, B/L TKA, multi-cystic liver disease, diverticulitis, cervical disc herniations  Daily Treatment Diary   Date  Visit # 19  1 19  2 19  3'     Manual        PROM L shoulder 10' 10' 10'                     There Exer  20' 25'      pendulems 20x each 30x each 30x each      Table slide flex 5"x15 5"x20 5"x20     Stand cane ER neutral 5"x15 5"x20 5"x20     Backward shoulder rolls 15x 30x 30x     Sup cane ER 90/90  5'x20 5"x20     Table Abd  5'x20 5"x20                                     HEP        NMRed                                                Modalities                                  HEP: Cont table slide abd and supine cane ER @ 90/90, pendulems, backward rolls, table slide flexion and stand cane ER in neutral    Assessment: Tolerated treatment well  Patient demonstrates significant PROM advancement from last session  Plan: Continue per plan of care

## 2019-01-28 ENCOUNTER — OFFICE VISIT (OUTPATIENT)
Dept: PHYSICAL THERAPY | Facility: CLINIC | Age: 59
End: 2019-01-28
Payer: COMMERCIAL

## 2019-01-28 DIAGNOSIS — M75.122 COMPLETE TEAR OF LEFT ROTATOR CUFF: Primary | ICD-10-CM

## 2019-01-28 PROCEDURE — 97110 THERAPEUTIC EXERCISES: CPT

## 2019-01-28 NOTE — PROGRESS NOTES
Daily Note     Today's date: 2019  Patient name: Lewanda Cockayne  : 1960  MRN: [id number]  Referring provider: Darius Austin MD  Dx:   Encounter Diagnosis     ICD-10-CM    1  Complete tear of left rotator cuff M75 122               Subjective: Pt states a strong dull ache t/o shoulder and a tender spot anterior shoulder  "Feels better once I  stretch a few times "     Objective: See treatment diary below    Precautions: LHB tenodesis & SAD w/ RC repair 1/3/19; RA, L4-5 fusion, B/L TKA, multi-cystic liver disease, diverticulitis, cervical disc herniations  Daily Treatment Diary   Date  Visit # 19  1 19  2 19  3 19  4    Manual        PROM L shoulder 10' 10' 10' 10'                    There Exer  20' 25' 25'    pendulems 20x each 30x each 30x each  30x each    Table slide flex 5"x15 5"x20 5"x20 5"x20    Stand cane ER neutral 5"x15 5"x20 5"x20 10"x20    Backward shoulder rolls 15x 30x 30x 30x    Sup cane ER 90/90  5'x20 5"x20 5"x20    Table Abd  5'x20 5"x20 5"x20    Self assist flex w/ opp UE    3"x10                            HEP        NMRed                                                Modalities                                  HEP: Cont table slide abd, supine cane ER @ 90/90, pendulems, backward rolls, table slide flexion and stand cane ER in neutral    Assessment: Tolerated treatment well and cont to demo steady gains in PROM  Patient exhibited good technique with therapeutic exercises and would benefit from continued PT      Plan: Continue per plan of care

## 2019-01-30 ENCOUNTER — APPOINTMENT (OUTPATIENT)
Dept: PHYSICAL THERAPY | Facility: CLINIC | Age: 59
End: 2019-01-30
Payer: COMMERCIAL

## 2019-01-31 ENCOUNTER — OFFICE VISIT (OUTPATIENT)
Dept: PHYSICAL THERAPY | Facility: CLINIC | Age: 59
End: 2019-01-31
Payer: COMMERCIAL

## 2019-01-31 DIAGNOSIS — M75.122 COMPLETE TEAR OF LEFT ROTATOR CUFF: Primary | ICD-10-CM

## 2019-01-31 PROCEDURE — 97140 MANUAL THERAPY 1/> REGIONS: CPT | Performed by: PHYSICAL THERAPIST

## 2019-01-31 PROCEDURE — 97110 THERAPEUTIC EXERCISES: CPT | Performed by: PHYSICAL THERAPIST

## 2019-01-31 NOTE — PROGRESS NOTES
Daily Note     Today's date: 2019  Patient name: Tamara Cobian  : 1960  MRN: [id number]  Referring provider: Flakita Hayden MD  Dx:   Encounter Diagnosis     ICD-10-CM    1  Complete tear of left rotator cuff M75 122                   Subjective: Pt reports she's tolerating therapy well so far  She does have end range discomfort with stretching, but no resting pain  Objective: See treatment diary below  PROM: flexion = 130  ER @90= 60  abd = 95  Precautions: LHB tenodesis & SAD w/ RC repair 1/3/19; RA, L4-5 fusion, B/L TKA, multi-cystic liver disease, diverticulitis, cervical disc herniations  Daily Treatment Diary   Date  Visit # 19  1 19  2 19  3 19  4 19  5   Manual        PROM L shoulder 10' 10' 10' 10' 10'                   There Exer  20' 25' 25' 30'   pendulems 20x each 30x each 30x each  30x each Pulleys 3'   Table slide flex 5"x15 5"x20 5"x20 5"x20    Stand cane ER neutral 5"x15 5"x20 5"x20 10"x20 DC   Backward shoulder rolls 15x 30x 30x 30x    Sup cane ER 90/90  5'x20 5"x20 5"x20 5"   2x10   Table Abd  5'x20 5"x20 5"x20    Self assist flex w/ opp UE    3"x10 5"  2x10   Post shld stretch     Supine  5" 2x10   Stand cane abd  AAROM     2x10   Strap IR     Next visit   Wall slides     Next visit   HEP     updated   NMRed        Prone scap set     20x   Prone row     2x10                           Modalities                                  HEP: supine AAROM flexion, stand cane abd, sup cane ER 90/90, post capsule stretch, prone row  Pt can Cont table slide abd/flex  Assessment: Tolerated treatment well and demonstrates improving P/AAROM from evaluation  Patient would benefit from continued PT and will likely need HEP/POC advancement next visit since she will be away x 2 weeks  Plan: Continue per plan of care  Pt leaving tomorrow to travel out of town - will be away for 2 weeks

## 2019-02-08 ENCOUNTER — APPOINTMENT (OUTPATIENT)
Dept: PHYSICAL THERAPY | Facility: CLINIC | Age: 59
End: 2019-02-08
Payer: COMMERCIAL

## 2019-02-11 ENCOUNTER — APPOINTMENT (OUTPATIENT)
Dept: PHYSICAL THERAPY | Facility: CLINIC | Age: 59
End: 2019-02-11
Payer: COMMERCIAL

## 2019-02-13 ENCOUNTER — OFFICE VISIT (OUTPATIENT)
Dept: PHYSICAL THERAPY | Facility: CLINIC | Age: 59
End: 2019-02-13
Payer: COMMERCIAL

## 2019-02-13 DIAGNOSIS — M75.122 COMPLETE TEAR OF LEFT ROTATOR CUFF: Primary | ICD-10-CM

## 2019-02-13 PROCEDURE — 97110 THERAPEUTIC EXERCISES: CPT

## 2019-02-13 NOTE — PROGRESS NOTES
Daily Note     Today's date: 2019  Patient name: Jeanice Shone  : 1960  MRN: [id number]  Referring provider: Jerardo Minaya MD  Dx:   Encounter Diagnosis     ICD-10-CM    1  Complete tear of left rotator cuff M75 122                   Subjective: Pt reports she has been achy since leaving for 2 weeks  Objective: See treatment diary below    Precautions: LHB tenodesis & SAD w/ RC repair 1/3/19; RA, L4-5 fusion, B/L TKA, multi-cystic liver disease, diverticulitis, cervical disc herniations  Daily Treatment Diary   Date  Visit # 19  6 19  2 19  3 19  4 19  5   Manual        PROM L shoulder 10' 10' 10' 10' 10'                   There Exer 30' 20' 25' 25' 30'   pendulems Pulleys 3' 30x each 30x each  30x each Pulleys 3'   Table slide flex  5"x20 5"x20 5"x20    Stand cane ER neutral  5"x20 5"x20 10"x20 DC   Backward shoulder rolls  30x 30x 30x    Sup cane ER 90/90 5"  2x10 5'x20 5"x20 5"x20 5"   2x10   Table Abd  5'x20 5"x20 5"x20    Self assist flex w/ opp UE 5"  2x10   3"x10 5"  2x10   Post shld stretch Supine  5" 2x10    Supine  5" 2x10   Stand cane abd  AAROM 2x10    2x10   Strap IR 10"x10    Next visit   Wall slides 2x10    Next visit   HEP     updated   NMRed        Prone scap set 20x    20x   Prone row 2x10    2x10                           Modalities                                  HEP: Added wall slides, IR stretch w/ towel, prone flex supine AAROM flexion, stand cane abd, sup cane ER 90/90, post capsule stretch, prone row  Pt can Cont table slide abd/flex  Assessment: Tolerated treatment well  Patient is leaving to travel tomorrow and will be away for another 2 weeks, updated HEP which she henrique well  Patient exhibited good technique with therapeutic exercises and would benefit from continued PT      Plan: Continue per plan of care  Pt leaving tomorrow to travel out of town - will be away for 2 weeks

## 2019-02-15 ENCOUNTER — APPOINTMENT (OUTPATIENT)
Dept: PHYSICAL THERAPY | Facility: CLINIC | Age: 59
End: 2019-02-15
Payer: COMMERCIAL

## 2019-02-19 NOTE — ADDENDUM NOTE
Addendum  created 02/19/19 8922 by Luis M Huang, DO    Intraprocedure Blocks edited, Sign clinical note

## 2019-02-26 ENCOUNTER — EVALUATION (OUTPATIENT)
Dept: PHYSICAL THERAPY | Facility: CLINIC | Age: 59
End: 2019-02-26
Payer: COMMERCIAL

## 2019-02-26 DIAGNOSIS — M75.122 COMPLETE TEAR OF LEFT ROTATOR CUFF: Primary | ICD-10-CM

## 2019-02-26 PROCEDURE — 97110 THERAPEUTIC EXERCISES: CPT | Performed by: PHYSICAL THERAPIST

## 2019-02-26 NOTE — PROGRESS NOTES
PT Re-Evaluation     Today's date: 2019  Patient name: Hai Chen  : 1960  MRN: [id number]  Referring provider: Sonia Amador MD  Dx:   Encounter Diagnosis     ICD-10-CM    1  Complete tear of left rotator cuff M75 122                   Assessment  Assessment details: Hai Chen is a 59 y o  female who has attended 7 therapy visits since rotator cuff surgery 1/3/19  She continues wth pain, decreased strength, decreased ROM and decreased joint mobility as expected for almost 8 weeks post op  Due to these impairments, patient has difficulty performing ADL's, recreational activities, engaging in social activities, lifting/carrying, reaching  Patient's clinical presentation is consistent with their referring diagnosis of Complete tear of left rotator cuff with bicep tenodesis and SAD  Plan: Ambulatory referral to Physical Therapy  Patient has been educated in post-op contraindications / precautions, home exercise program progression and plan of care  Patient would benefit from continued skilled physical therapy services per original POC to further  address her aforementioned functional limitations and progress towards prior level of function and independence with home exercise program      Impairments: abnormal or restricted ROM, activity intolerance, impaired physical strength, lacks appropriate home exercise program, pain with function, scapular dyskinesis and poor posture   Functional limitations: sleep disturbed, inability to reach over head, out to side or behind plane of bodyBarriers to therapy: Pt aggravated her shoulder while in the shower - accidentally reached forward a little and felt "twinge" and pop at approximately 10 days post-op  Pt has attended 2 visits in February due to travel (19 & 19)  Understanding of Dx/Px/POC: good   Prognosis: good    Goals  Short Term Goals to be met in 4 weeks (19)  1  Pt to be independent w/ prelimary HEP -met  2   PROM L shoulder to be within 10 degrees of R shoulder - improved/not met  3  Improve elbow L AROM to full extension  - met  4  Improve AROM flexion and abduction to 135 or better to improve subjective function by at least 25%  - partially met    Mid Term Goals to be met in 12 weeks (19)  1  AROM L shoulder to be within 5 degrees of R shoulder w/o pain to allow ease w/ ADL's and IADL's  2  Improve strength to 4-/5 or better to allow reaching and light carrying  3  Undisturbed sleep  4  Pt to resume driving  - met    Long term goals to be set at later date  Plan  Plan details:       Patient would benefit from: PT eval and skilled physical therapy  Planned modality interventions: cryotherapy, thermotherapy: hydrocollator packs and unattended electrical stimulation  Planned therapy interventions: manual therapy, neuromuscular re-education, therapeutic exercise, therapeutic activities, home exercise program, stretching, patient education and postural training  Frequency: 2x week (2-3x/week)  Plan of Care expiration date: 2019  Treatment plan discussed with: patient        Subjective Evaluation    History of Present Illness  Date of surgery: 1/3/2019  Mechanism of injury: Pt's had years of shoulder pain L>R  She had pain/difficulty with overhead/forward reaching, carrying, grooming or letting arm hang unsupported  Prior to surgery, pain was intermittent, worse at night 8/10 (limiting her sleep) and with movement  Pt takes Meloxicam w/ some benefit  PMH is significant for RA, L4-5 fusion, B/L TKA, multi-cystic liver disease, diverticulitis, cervical disc herniations  She accidentally reached a little in forward direction while in the shower 7pm 19 - and heard a pop/felt a "twinge" and has had more pain since then    Pain  At best pain ratin  At worst pain ratin  Location: anterior shoulder,    Treatments  Current treatment: physical therapy  Patient Goals  Patient goals for therapy: decreased pain  Patient goal: sleep undisturbed in bed        Objective      Posture: Pt has D/C sling  Mild forward head posture  AROM stand/PROM supine L  R  L      2/26/19 1/17/19 1/17/19      A/PROM A/PROM PROM  Shoulder flex         140/150 160/170* 100*  Shoulder abd        118*A/P 150/160* 80*  Functional ER   75/80  90/90  [email or web address]  Functional IR   L1/50  T11/55  [email or web address]    MMT:   L  R  L     2/26/19 1/17/19 1/17/19  Shoulder flex        3+/5  4+/5  NT  Shoulder abd       3/5*  4/5*  NT  ER @neutral  3+/5  4/5  NT  IR @neutral  4-/5  5/5  NT      Cervical AROM loss:     2/26/19 1/17/19  Flex   Nil  nil   Ext   Mod//clarita Mod/clarita  R rot   Nil  nil  L rot   Nil  nil  R SB   Mod  clarita  L SB   Mod  clarita  Retraction  Nil  Min    Rep c/s retraction 3x10= NE      Precautions: LHB tenodesis & SAD w/ RC repair 1/3/19; RA, L4-5 fusion, B/L TKA, multi-cystic liver disease, diverticulitis, cervical disc herniations  DOS: 1/3/19       Daily Treatment Diary   Date  Visit # 2/13/19  6 2/26/19  7  RE/FOTO      Manual        PROM L shoulder 10'                       There Exer 30' 45'      pulleys 3'  3'              Sup cane ER 90/90 5"  2x10 1#  10x10"      sidelying ER  2x15      sidelying  Abd  2" 2x10      Self assist flex w/ opp UE 5"  2x10 Cane 1#  10"x10      Post shld stretch Supine  5" 2x10       Strap IR 10"x10 10x10"      Wall slides 2x10       AROM scaption  3x5      HEP  Updated/reviewed      NMRed        Prone scap set 20x W/ ext 2#  2x10      Prone row 2x10                               Modalities                                  HEP: per today's daily treatment log

## 2019-02-27 ENCOUNTER — OFFICE VISIT (OUTPATIENT)
Dept: OBGYN CLINIC | Facility: CLINIC | Age: 59
End: 2019-02-27

## 2019-02-27 VITALS
WEIGHT: 254.4 LBS | HEART RATE: 98 BPM | HEIGHT: 67 IN | BODY MASS INDEX: 39.93 KG/M2 | SYSTOLIC BLOOD PRESSURE: 161 MMHG | DIASTOLIC BLOOD PRESSURE: 81 MMHG

## 2019-02-27 DIAGNOSIS — Z98.890 STATUS POST LEFT ROTATOR CUFF REPAIR: Primary | ICD-10-CM

## 2019-02-27 PROCEDURE — 99024 POSTOP FOLLOW-UP VISIT: CPT | Performed by: ORTHOPAEDIC SURGERY

## 2019-02-27 RX ORDER — TRAMADOL HYDROCHLORIDE 50 MG/1
TABLET ORAL AS NEEDED
Refills: 0 | COMMUNITY
Start: 2019-01-28 | End: 2020-02-13

## 2019-02-27 NOTE — PROGRESS NOTES
Assessment/Plan:  1  Status post left rotator cuff repair       Patient is doing well status post rotator cuff repair  We did review her physical therapy notes today  She will continue physical therapy on the rotator cuff repair protocol  She will follow up in 6 weeks for repeat evaluation  Subjective:   Nelly Smith is a 59 y o  female who presents today for follow-up of her left shoulder, now about 7 weeks status post arthroscopic rotator cuff repair  She wean from her sling about 1 week ago  She denies much pain about the shoulder at all  She notes good range of motion  She denies any paresthesias of the left upper extremity  She feels she is progressing well with physical therapy      Review of Systems      Past Medical History:   Diagnosis Date    Diverticulitis, colon     Hypertension     Hypothyroidism     h/o " borderline "  issues    Liver cyst     last assessed 4/18/16    Lyme disease     Positive Anaplasma serology     RA [facility]rheumatoid arthritis) ([facility] )     Seizure disorder in pregnancy ([facility] )     had 1 seizure x1 1986 during pregnancy- none after    Varicose vein of leg        Past Surgical History:   Procedure Laterality Date    BACK SURGERY      fusion/refusion of 2-8 vertebrae    COLECTOMY      partial sigmoid, last assessed 4/18/16    JOINT REPLACEMENT      LAPAROSCOPY      with adnexectomy    LIVER SURGERY      TX SHLDR ARTHROSCOP,SURG,W/ROTAT CUFF REPR Left 1/3/2019    Procedure: REPAIR ROTATOR CUFF  ARTHROSCOPIC, BICEPTS TENODESIS, SUBACROMIAL DECOMPRESION;  Surgeon: Liz Borrego MD;  Location: [facility];  Service: Orthopedics    REPLACEMENT TOTAL KNEE Bilateral     SINUS SURGERY      TONSILLECTOMY      TYMPANOSTOMY TUBE PLACEMENT         Family History   Problem Relation Age of Onset    Hypertension Mother     Arthritis Father     Hyperthyroidism Father     Hypertension Father     No Known Problems Sister     No Known Problems Brother     No Known Problems Maternal Aunt     No Known Problems Maternal Uncle     No Known Problems Paternal Aunt     No Known Problems Paternal Uncle     No Known Problems Maternal Grandmother     No Known Problems Maternal Grandfather     No Known Problems Paternal Grandmother     No Known Problems Paternal Grandfather     ADD / ADHD Neg Hx     Anesthesia problems Neg Hx     Cancer Neg Hx     Clotting disorder Neg Hx     Collagen disease Neg Hx     Diabetes Neg Hx     Dislocations Neg Hx     Learning disabilities Neg Hx     Neurological problems Neg Hx     Osteoporosis Neg Hx     Rheumatologic disease Neg Hx     Scoliosis Neg Hx     Vascular Disease Neg Hx        Social History     Occupational History    Not on file   Tobacco Use    Smoking status: Never Smoker    Smokeless tobacco: Never Used   Substance and Sexual Activity    Alcohol use: No    Drug use: No    Sexual activity: Not on file         Current Outpatient Medications:     atenolol (TENORMIN) 100 mg tablet, take 1 tablet by mouth once daily, Disp: 30 tablet, Rfl: 6    fluticasone (FLONASE) 50 mcg/act nasal spray, 2 sprays into each nostril daily, Disp: 16 g, Rfl: 3    hydrochlorothiazide (HYDRODIURIL) 25 mg tablet, take 1 tablet by mouth once daily, Disp: 30 tablet, Rfl: 6    hydroxychloroquine (PLAQUENIL) 200 mg tablet, Take 200 mg by mouth 2 (two) times a day with meals, Disp: , Rfl: 0    loratadine (CLARITIN) 10 mg tablet, Take 1 tablet (10 mg total) by mouth daily, Disp: 90 tablet, Rfl: 3    meloxicam (MOBIC) 15 mg tablet, Take by mouth, Disp: , Rfl:     RESTASIS MULTIDOSE 0 05 % ophthalmic emulsion, Administer to both eyes every 12 (twelve) hours  , Disp: , Rfl:     traMADol (ULTRAM) 50 mg tablet, as needed, Disp: , Rfl: 0    Allergies   Allergen Reactions    Pollen Extract Other (See Comments)     headaches       Objective:  Vitals:    02/27/19 1102   BP: 161/81   Pulse: 98       Left Shoulder Exam     Tenderness   The patient is experiencing no tenderness       Range of Motion   Active abduction: 120   Forward flexion: 140     Tests   Drop arm: negative    Other   Erythema: absent  Scars: present  Sensation: normal  Pulse: present     Comments:  Strength testing deferred            Physical Exam

## 2019-03-11 ENCOUNTER — APPOINTMENT (OUTPATIENT)
Dept: PHYSICAL THERAPY | Facility: CLINIC | Age: 59
End: 2019-03-11
Payer: COMMERCIAL

## 2019-03-12 ENCOUNTER — OFFICE VISIT (OUTPATIENT)
Dept: PHYSICAL THERAPY | Facility: CLINIC | Age: 59
End: 2019-03-12
Payer: COMMERCIAL

## 2019-03-12 DIAGNOSIS — M75.122 COMPLETE TEAR OF LEFT ROTATOR CUFF: Primary | ICD-10-CM

## 2019-03-12 PROCEDURE — 97112 NEUROMUSCULAR REEDUCATION: CPT | Performed by: PHYSICAL THERAPIST

## 2019-03-12 PROCEDURE — 97110 THERAPEUTIC EXERCISES: CPT | Performed by: PHYSICAL THERAPIST

## 2019-03-12 NOTE — PROGRESS NOTES
Daily Note     Today's date: 3/12/2019  Patient name: Kelli Sarmiento  : 1960  MRN: [id number]  Referring provider: Abner Valle MD  Dx:   Encounter Diagnosis     ICD-10-CM    1  Complete tear of left rotator cuff M75 122                   Subjective: Pt reports her shoulder pain is intermittent, generally low grade 3/10 unless she "tries something I shouldn't" ie: lifting a gallon of milk to/from top shelf of refrigerator  Objective: See treatment diary below; HEP updated today - see media  Precautions: LHB tenodesis & SAD w/ RC repair 1/3/19; RA, L4-5 fusion, B/L TKA, multi-cystic liver disease, diverticulitis, cervical disc herniations  DOS: 1/3/19       Daily Treatment Diary   Date  Visit # 19  6 19  7  RE/FOTO 3/12/19  8     Manual        PROM L shoulder 10'                       There Exer 30' 39' 20'     pulleys 3'  3' 4'     Sleeper stretch   10"x5     Sup cane ER 90/90 5"  2x10 1#  10x10" 1#  10x10"     sidelying ER  2x15      sidelying  Abd  2" 2x10 1# 10x10"     Self assist flex w/ opp UE 5"  2x10 Cane 1#  10"x10 Uni 1# 10x10'     Post shld stretch Supine  5" 2x10       Strap IR 10"x10 10x10"      Wall slides 2x10       AROM scaption  3x5      HEP  Update/review Update/review     TB IR   Blue 30x     TB add   Blue 30x     TB B/L ER   Red 2x10     NMRed   10'     Prone scap set 20x W/ ext 2#  2x10      Prone row 2x10       Prone "T"   2x10     Prone "W"   2x10             Modalities                                  HEP update: per today's daily treatment log  Assessment: Tolerated treatment well and is able to advance per protocol w/ good challenge  Patient demonstrated fatigue post treatment and would benefit from continued PT  Pt was advised she does not yet have full A/PROM, but is generally improving  Pt''s frequency of visits is less than ideal of which she is aware  Plan: Progress treament per protocol

## 2019-03-13 ENCOUNTER — OFFICE VISIT (OUTPATIENT)
Dept: CARDIOLOGY CLINIC | Facility: CLINIC | Age: 59
End: 2019-03-13
Payer: COMMERCIAL

## 2019-03-13 VITALS
BODY MASS INDEX: 39.08 KG/M2 | WEIGHT: 249 LBS | SYSTOLIC BLOOD PRESSURE: 140 MMHG | HEART RATE: 58 BPM | OXYGEN SATURATION: 97 % | HEIGHT: 67 IN | DIASTOLIC BLOOD PRESSURE: 80 MMHG

## 2019-03-13 DIAGNOSIS — R94.31 EKG ABNORMALITY: ICD-10-CM

## 2019-03-13 DIAGNOSIS — I10 ESSENTIAL HYPERTENSION: Primary | ICD-10-CM

## 2019-03-13 PROCEDURE — 93000 ELECTROCARDIOGRAM COMPLETE: CPT | Performed by: INTERNAL MEDICINE

## 2019-03-13 PROCEDURE — 99243 OFF/OP CNSLTJ NEW/EST LOW 30: CPT | Performed by: INTERNAL MEDICINE

## 2019-03-13 NOTE — PROGRESS NOTES
Cardiology Consultation     Timbo Alvarenga  [id number]  1960  [facility] [address]  [facility] [address] [address]    Consult for: Abnormal ECG  Appreciate consult by: Nila Mckenzie MD      HPI: Timbo Alvarenga is a 59y o  year old female who is here for evaluation of an abnormal ECG  She had an ECG done 3 months ago prior to shoulder surgery that showed possible LVH  She has a history of  Hypertension since she was 26years old  She has been on atenolol since then  HCTZ was added afterwards  She has shortness of breath intermittently as she exerts herself  She denies any LE edema, orthopnea or PND  She denies chest pain  No family history of CAD  Does not snore  Feels tired during the day  She takes Plaquenil for RA      Past Medical History:   Diagnosis Date    Diverticulitis, colon     Hypertension     Hypothyroidism     h/o " borderline "  issues    Liver cyst     last assessed 4/18/16    Lyme disease     Positive Anaplasma serology     [facility] )     Seizure disorder in pregnancy ([facility] )     had 1 seizure x1 1986 during pregnancy- none after    Varicose vein of leg      Social History     Socioeconomic History    Marital status: /Civil Union     Spouse name: Not on file    Number of children: Not on file    Years of education: Not on file    Highest education level: Not on file   Occupational History    Not on file   Social Needs    Financial resource strain: Not on file    Food insecurity:     Worry: Not on file     Inability: Not on file    Transportation needs:     Medical: Not on file     Non-medical: Not on file   Tobacco Use    Smoking status: Never Smoker    Smokeless tobacco: Never Used   Substance and Sexual Activity    Alcohol use: No    Drug use: No    Sexual activity: Not on file   Lifestyle    Physical activity:     Days per week: Not on file Minutes per session: Not on file    Stress: Not on file   Relationships    Social connections:     Talks on phone: Not on file     Gets together: Not on file     Attends Christianity service: Not on file     Active member of club or organization: Not on file     Attends meetings of clubs or organizations: Not on file     Relationship status: Not on file    Intimate partner violence:     Fear of current or ex partner: Not on file     Emotionally abused: Not on file     Physically abused: Not on file     Forced sexual activity: Not on file   Other Topics Concern    Not on file   Social History Narrative    Always uses seat belt      Family History   Problem Relation Age of Onset    Hypertension Mother     Arthritis Father     Hyperthyroidism Father     Hypertension Father     No Known Problems Sister     No Known Problems Brother     No Known Problems Maternal Aunt     No Known Problems Maternal Uncle     No Known Problems Paternal Aunt     No Known Problems Paternal Uncle     No Known Problems Maternal Grandmother     No Known Problems Maternal Grandfather     No Known Problems Paternal Grandmother     No Known Problems Paternal Grandfather     ADD / ADHD Neg Hx     Anesthesia problems Neg Hx     Cancer Neg Hx     Clotting disorder Neg Hx     Collagen disease Neg Hx     Diabetes Neg Hx     Dislocations Neg Hx     Learning disabilities Neg Hx     Neurological problems Neg Hx     Osteoporosis Neg Hx     Rheumatologic disease Neg Hx     Scoliosis Neg Hx     Vascular Disease Neg Hx      Past Surgical History:   Procedure Laterality Date    BACK SURGERY      fusion/refusion of 2-8 vertebrae    COLECTOMY      partial sigmoid, last assessed 4/18/16    JOINT REPLACEMENT      LAPAROSCOPY      with adnexectomy    LIVER SURGERY      GA SHLDR ARTHROSCOP,SURG,W/ROTAT CUFF REPR Left 1/3/2019    Procedure: REPAIR ROTATOR CUFF  ARTHROSCOPIC, BICEPTS TENODESIS, SUBACROMIAL DECOMPRESION;  Surgeon: Marva Ardon MD;  Location: [address] [place] [address];  Service: Orthopedics    REPLACEMENT TOTAL KNEE Bilateral     SINUS SURGERY      TONSILLECTOMY      TYMPANOSTOMY TUBE PLACEMENT         Current Outpatient Medications:     atenolol (TENORMIN) 100 mg tablet, take 1 tablet by mouth once daily, Disp: 30 tablet, Rfl: 6    hydrochlorothiazide (HYDRODIURIL) 25 mg tablet, take 1 tablet by mouth once daily, Disp: 30 tablet, Rfl: 6    hydroxychloroquine (PLAQUENIL) 200 mg tablet, Take 200 mg by mouth 2 (two) times a day with meals, Disp: , Rfl: 0    meloxicam (MOBIC) 15 mg tablet, Take by mouth as needed , Disp: , Rfl:     RESTASIS MULTIDOSE 0 05 % ophthalmic emulsion, Administer to both eyes every 12 (twelve) hours  , Disp: , Rfl:     traMADol (ULTRAM) 50 mg tablet, as needed, Disp: , Rfl: 0    fluticasone (FLONASE) 50 mcg/act nasal spray, 2 sprays into each nostril daily (Patient not taking: Reported on 3/13/2019), Disp: 16 g, Rfl: 3    loratadine (CLARITIN) 10 mg tablet, Take 1 tablet (10 mg total) by mouth daily (Patient not taking: Reported on 3/13/2019), Disp: 90 tablet, Rfl: 3  Allergies   Allergen Reactions    Pollen Extract Other (See Comments)     headaches         Review of Systems:  Review of Systems   Constitutional: Positive for fatigue  Negative for chills and fever  HENT: Negative for congestion, nosebleeds and postnasal drip  Respiratory: Positive for shortness of breath  Negative for cough and chest tightness  Cardiovascular: Negative for chest pain, palpitations and leg swelling  Gastrointestinal: Negative for abdominal distention, abdominal pain, diarrhea, nausea and vomiting  Endocrine: Negative for polydipsia, polyphagia and polyuria  Musculoskeletal: Positive for arthralgias and myalgias  Negative for gait problem  Skin: Negative for color change, pallor and rash  Allergic/Immunologic: Negative for environmental allergies, food allergies and immunocompromised state  Neurological: Negative for dizziness, seizures, syncope and light-headedness  Hematological: Negative for adenopathy  Does not bruise/bleed easily  Psychiatric/Behavioral: Negative for dysphoric mood  The patient is not nervous/anxious  Physical Exam:  Vitals:    03/13/19 1102   BP: 140/80   BP Location: Left arm   Patient Position: Sitting   Cuff Size: Standard   Pulse: 58   SpO2: 97%   Weight: 113 kg (249 lb)   Height: 5' 7" (1 702 m)     Physical Exam   Constitutional: She is oriented to person, place, and time  She appears well-developed  No distress  HENT:   Head: Normocephalic and atraumatic  Eyes: Pupils are equal, round, and reactive to light  Conjunctivae and EOM are normal    Neck: Neck supple  No JVD present  No thyromegaly present  Cardiovascular: Normal rate and regular rhythm  Exam reveals no gallop and no friction rub  Murmur heard  Pulmonary/Chest: Effort normal and breath sounds normal    Abdominal: Soft  She exhibits no distension  There is no tenderness  Musculoskeletal: She exhibits no edema  Neurological: She is alert and oriented to person, place, and time  No cranial nerve deficit  Skin: Skin is warm and dry  No rash noted  She is not diaphoretic  No erythema  Psychiatric: She has a normal mood and affect   Her behavior is normal  Judgment and thought content normal        Labs:  Lab Results   Component Value Date     06/10/2016    K 3 7 12/07/2018    K 4 2 06/10/2016     12/07/2018     06/10/2016    CO2 30 12/07/2018    BUN 16 12/07/2018    BUN 14 06/10/2016    CREATININE 0 78 12/07/2018    CREATININE 0 73 06/10/2016    GLUCOSE 103 (H) 06/10/2016    CALCIUM 9 3 12/07/2018    CALCIUM 9 7 06/10/2016     Lab Results   Component Value Date    WBC 3 42 (L) 12/07/2018    WBC 3 3 (L) 06/10/2016    HGB 13 3 12/07/2018    HGB 14 0 06/10/2016    HCT 40 9 12/07/2018    HCT 41 5 06/10/2016    MCV 96 12/07/2018    MCV 92 06/10/2016     12/07/2018 Lab Results   Component Value Date    CHOL 177 06/10/2016    TRIG 95 05/30/2018    HDL 44 05/30/2018     Imaging: No results found  EKG (independently reviewed): NSR with LVH     Discussion/Summary:  1  EKG abnormality - findings consistent with LVH - patient has a longstanding history of hypertension     - Will obtain 2D echocardiogram for further evaluation    2  Essential hypertension  - BP is 140/80 - Target BP of 130/80  - Will followup after echocardiogram and adjust medications  Consider discontinuation of atenolol as it may be contributing to her fatigue

## 2019-03-21 ENCOUNTER — OFFICE VISIT (OUTPATIENT)
Dept: PHYSICAL THERAPY | Facility: CLINIC | Age: 59
End: 2019-03-21
Payer: COMMERCIAL

## 2019-03-21 DIAGNOSIS — M75.122 COMPLETE TEAR OF LEFT ROTATOR CUFF: Primary | ICD-10-CM

## 2019-03-21 PROCEDURE — 97140 MANUAL THERAPY 1/> REGIONS: CPT | Performed by: PHYSICAL THERAPIST

## 2019-03-21 PROCEDURE — 97110 THERAPEUTIC EXERCISES: CPT | Performed by: PHYSICAL THERAPIST

## 2019-03-21 NOTE — PROGRESS NOTES
Daily Note     Today's date: 3/21/2019  Patient name: Don Browne  : 1960  MRN: [id number]  Referring provider: Kathleen Lennon MD  Dx:   Encounter Diagnosis     ICD-10-CM    1  Complete tear of left rotator cuff M75 122                   Subjective: Pt states she's keeping up w/ HEP, but later admits she forgot about stretches  She is going away again for 2 weeks, so won't return for PT until the 2nd week in April  Objective: See treatment diary below  Precautions: LHB tenodesis & SAD w/ RC repair 1/3/19; RA, L4-5 fusion, B/L TKA, multi-cystic liver disease, diverticulitis, cervical disc herniations  DOS: 1/3/19       Daily Treatment Diary   Date  Visit # 19  6 19  7  RE/FOTO 3/12/19  8 3/21/19  9  FOTO    Manual        PROM L shoulder 10'   10'                    There Exer 30' 45' 20' 50''    pulleys 3'  3' 4'     Sleeper stretch   10"x5 10"x5    Sup cane ER 90/90 5"  2x10 1#  10x10" 1#  10x10" 1#   10x10"    sidelying ER  2x15  [email or web address] 2# 3x10    sidelying  Abd  2" 2x10 1# 10x10" 1#10x10"    Self assist flex w/ opp UE 5"  2x10 Cane 1#  10"x10 Uni 1# 10x10' Uni 1# 10x10"    Post shld stretch Supine  5" 2x10       Strap IR 10"x10 10x10"      Wall slides 2x10   3x10    AROM scaption  3x5  1# 3x10    HEP  Update/review Update/review Update/rev    TB IR   Blue 30x     TB add   Blue 30x     TB B/L ER   Red 2x10 Red 3x10    Wall push ups    3x10    NMRed   10'     Prone scap set 20x W/ ext 2#  2x10      Prone row 2x10       Prone "T"   2x10     Prone "W"   2x10             Modalities                                  HEP update: added wall push ups, [email or web address] and scaption, Updated HEP in media  Reminded pt of importance of stretching as she does not yet have full PROM  Reviewed full HEP from last visit 19  Assessment: Tolerated treatment well   Patient demonstrated fatigue post treatment, would benefit from continued PT and understands her sporatic attendance is not ideal      Plan: Progress treatment as tolerated  Progress treament per protocol  Re-assessment next visit

## 2019-04-08 ENCOUNTER — EVALUATION (OUTPATIENT)
Dept: PHYSICAL THERAPY | Facility: CLINIC | Age: 59
End: 2019-04-08
Payer: COMMERCIAL

## 2019-04-08 DIAGNOSIS — Z98.890 STATUS POST LEFT ROTATOR CUFF REPAIR: ICD-10-CM

## 2019-04-08 DIAGNOSIS — S46.012D TRAUMATIC COMPLETE TEAR OF LEFT ROTATOR CUFF, SUBSEQUENT ENCOUNTER: Primary | ICD-10-CM

## 2019-04-08 PROCEDURE — 97140 MANUAL THERAPY 1/> REGIONS: CPT | Performed by: PHYSICAL THERAPIST

## 2019-04-08 PROCEDURE — 97110 THERAPEUTIC EXERCISES: CPT | Performed by: PHYSICAL THERAPIST

## 2019-04-09 ENCOUNTER — HOSPITAL ENCOUNTER (OUTPATIENT)
Dept: NON INVASIVE DIAGNOSTICS | Facility: HOSPITAL | Age: 59
Discharge: HOME | End: 2019-04-09
Attending: INTERNAL MEDICINE
Payer: COMMERCIAL

## 2019-04-09 DIAGNOSIS — R94.31 EKG ABNORMALITY: ICD-10-CM

## 2019-04-09 DIAGNOSIS — I10 ESSENTIAL HYPERTENSION: ICD-10-CM

## 2019-04-09 PROCEDURE — 93306 TTE W/DOPPLER COMPLETE: CPT

## 2019-04-10 ENCOUNTER — OFFICE VISIT (OUTPATIENT)
Dept: OBGYN CLINIC | Facility: CLINIC | Age: 59
End: 2019-04-10
Payer: COMMERCIAL

## 2019-04-10 DIAGNOSIS — Z98.890 STATUS POST LEFT ROTATOR CUFF REPAIR: Primary | ICD-10-CM

## 2019-04-10 PROCEDURE — 93306 TTE W/DOPPLER COMPLETE: CPT | Performed by: INTERNAL MEDICINE

## 2019-04-10 PROCEDURE — 99213 OFFICE O/P EST LOW 20 MIN: CPT | Performed by: PHYSICIAN ASSISTANT

## 2019-04-12 ENCOUNTER — TELEPHONE (OUTPATIENT)
Dept: CARDIOLOGY CLINIC | Facility: CLINIC | Age: 59
End: 2019-04-12

## 2019-04-16 ENCOUNTER — APPOINTMENT (OUTPATIENT)
Dept: PHYSICAL THERAPY | Facility: CLINIC | Age: 59
End: 2019-04-16
Payer: COMMERCIAL

## 2019-04-23 ENCOUNTER — ANNUAL EXAM (OUTPATIENT)
Dept: FAMILY MEDICINE CLINIC | Facility: CLINIC | Age: 59
End: 2019-04-23
Payer: COMMERCIAL

## 2019-04-23 VITALS
BODY MASS INDEX: 39.16 KG/M2 | SYSTOLIC BLOOD PRESSURE: 128 MMHG | OXYGEN SATURATION: 97 % | WEIGHT: 250 LBS | RESPIRATION RATE: 16 BRPM | DIASTOLIC BLOOD PRESSURE: 84 MMHG | HEART RATE: 65 BPM

## 2019-04-23 DIAGNOSIS — Z12.39 SCREENING FOR BREAST CANCER: ICD-10-CM

## 2019-04-23 DIAGNOSIS — Z12.4 SCREENING FOR CERVICAL CANCER: ICD-10-CM

## 2019-04-23 DIAGNOSIS — Z11.3 SCREEN FOR STD (SEXUALLY TRANSMITTED DISEASE): ICD-10-CM

## 2019-04-23 DIAGNOSIS — Z01.419 ENCOUNTER FOR GYNECOLOGICAL EXAMINATION WITHOUT ABNORMAL FINDING: Primary | ICD-10-CM

## 2019-04-23 PROCEDURE — S0612 ANNUAL GYNECOLOGICAL EXAMINA: HCPCS | Performed by: FAMILY MEDICINE

## 2019-04-26 LAB
BACTERIA GENITAL AEROBE CULT: ABNORMAL
CYTOLOGIST CVX/VAG CYTO: NORMAL
DX ICD CODE: NORMAL
HPV I/H RISK 1 DNA CVX QL PROBE+SIG AMP: NEGATIVE
HPV LOW RISK DNA CVX QL PROBE+SIG AMP: NEGATIVE
Lab: ABNORMAL
Lab: ABNORMAL
OTHER STN SPEC: NORMAL
PATH REPORT.FINAL DX SPEC: NORMAL
SL AMB NOTE:: NORMAL
SL AMB SPECIMEN ADEQUACY: NORMAL

## 2019-04-29 DIAGNOSIS — N76.0 ACUTE VAGINITIS: Primary | ICD-10-CM

## 2019-04-29 RX ORDER — FLUCONAZOLE 150 MG/1
150 TABLET ORAL ONCE
Qty: 1 TABLET | Refills: 0 | Status: SHIPPED | OUTPATIENT
Start: 2019-04-29 | End: 2019-04-29

## 2019-06-12 DIAGNOSIS — I10 ESSENTIAL HYPERTENSION: ICD-10-CM

## 2019-06-12 RX ORDER — HYDROCHLOROTHIAZIDE 25 MG/1
25 TABLET ORAL DAILY
Qty: 30 TABLET | Refills: 6 | Status: SHIPPED | OUTPATIENT
Start: 2019-06-12 | End: 2020-01-22 | Stop reason: SDUPTHER

## 2019-06-12 RX ORDER — ATENOLOL 100 MG/1
100 TABLET ORAL DAILY
Qty: 30 TABLET | Refills: 6 | Status: SHIPPED | OUTPATIENT
Start: 2019-06-12 | End: 2020-01-22 | Stop reason: SDUPTHER

## 2019-07-24 ENCOUNTER — TRANSCRIBE ORDERS (OUTPATIENT)
Dept: ADMINISTRATIVE | Facility: HOSPITAL | Age: 59
End: 2019-07-24

## 2019-07-24 ENCOUNTER — HOSPITAL ENCOUNTER (OUTPATIENT)
Dept: RADIOLOGY | Facility: HOSPITAL | Age: 59
Discharge: HOME | End: 2019-07-24
Attending: INTERNAL MEDICINE
Payer: COMMERCIAL

## 2019-07-24 DIAGNOSIS — M77.8 TENDINITIS OF RIGHT SHOULDER: ICD-10-CM

## 2019-07-24 DIAGNOSIS — M77.8 TENDINITIS OF RIGHT SHOULDER: Primary | ICD-10-CM

## 2019-07-24 PROCEDURE — 73030 X-RAY EXAM OF SHOULDER: CPT

## 2019-07-25 ENCOUNTER — TELEPHONE (OUTPATIENT)
Dept: FAMILY MEDICINE CLINIC | Facility: CLINIC | Age: 59
End: 2019-07-25

## 2019-07-25 DIAGNOSIS — Z12.39 SCREENING FOR BREAST CANCER: Primary | ICD-10-CM

## 2019-07-25 NOTE — TELEPHONE ENCOUNTER
PT WAS TOLD HER ORDER FOR MAMMO HAD  WHEN SHE CALLED TO SCHEDULE  ,IT WAS DATED 19  CAN SOMEONE DO A NEW ORDER FOR CHART AND LET ADWOA KNOW WHEN IT IS IN THE CHART SO I ACN ATTACH INSURANCE REFERRAL AND CALL PT?

## 2019-09-19 ENCOUNTER — TELEPHONE (OUTPATIENT)
Dept: FAMILY MEDICINE CLINIC | Facility: CLINIC | Age: 59
End: 2019-09-19

## 2019-09-19 ENCOUNTER — HOSPITAL ENCOUNTER (OUTPATIENT)
Dept: MAMMOGRAPHY | Facility: CLINIC | Age: 59
Discharge: HOME | End: 2019-09-19
Payer: COMMERCIAL

## 2019-09-19 VITALS — HEIGHT: 67 IN | BODY MASS INDEX: 39.24 KG/M2 | WEIGHT: 250 LBS

## 2019-09-19 DIAGNOSIS — Z12.39 SCREENING FOR BREAST CANCER: ICD-10-CM

## 2019-09-19 PROCEDURE — 77063 BREAST TOMOSYNTHESIS BI: CPT

## 2019-09-19 PROCEDURE — 77067 SCR MAMMO BI INCL CAD: CPT

## 2019-11-20 ENCOUNTER — OFFICE VISIT (OUTPATIENT)
Dept: FAMILY MEDICINE CLINIC | Facility: CLINIC | Age: 59
End: 2019-11-20
Payer: COMMERCIAL

## 2019-11-20 VITALS
TEMPERATURE: 99.1 F | HEIGHT: 67 IN | SYSTOLIC BLOOD PRESSURE: 116 MMHG | RESPIRATION RATE: 18 BRPM | HEART RATE: 82 BPM | DIASTOLIC BLOOD PRESSURE: 80 MMHG | BODY MASS INDEX: 39.24 KG/M2 | WEIGHT: 250 LBS | OXYGEN SATURATION: 96 %

## 2019-11-20 DIAGNOSIS — J06.9 VIRAL URI WITH COUGH: Primary | ICD-10-CM

## 2019-11-20 PROCEDURE — 99213 OFFICE O/P EST LOW 20 MIN: CPT | Performed by: FAMILY MEDICINE

## 2019-11-20 RX ORDER — BENZONATATE 200 MG/1
200 CAPSULE ORAL 3 TIMES DAILY PRN
Qty: 30 CAPSULE | Refills: 1 | Status: SHIPPED | OUTPATIENT
Start: 2019-11-20 | End: 2020-02-13

## 2019-11-21 NOTE — PROGRESS NOTES
Subjective     Whitney Cogan is a 62 y o  female here for evaluation of a cough  Onset of symptoms was 4 days ago  Symptoms have been initially worsening, but over the past day slightly improving  The cough is dry, nonproductive and frequent and is aggravated by  nothing in particular - cough is nearly constant  Associated symptoms include: throat pain due to cough, sinus congestion & headache, bilateral ear fullness, postnasal drip, slight wheezing which develops after a coughing fit but resolves quickly afterwards  Patient does not have a history of asthma  Patient does have a history of environmental allergens, however states that usually her claritin and flonase control the symptoms well  Patient has not traveled recently  Patient does not have a history of smoking  Patient has not had a previous chest x-ray  She has tried combination cold medications for symptom control with some improvement, however she continues to have persistent sinus congestion which is giving her a headache, which is her main complaint  Her cough is bothersome, but not particularly painful, except for causing throat pain/discomfort due to its frequency  The patient denies fevers, chills, sneezing, eye itching/pain/discharge, ear pain, tinnitus or hearing loss, nausea, vomiting, constipation, diarrhea, abdominal pain, dysuria, hematuria, or any other /GI symptoms  Patient states she does feel better today than yesterday, but is going on a cruise next Wednesday and wants to ensure that she feels well for it  The following portions of the patient's history were reviewed and updated as appropriate: allergies, current medications, past family history, past medical history, past social history, past surgical history and problem list     Review of Systems  Pertinent items are noted in HPI       Objective     Oxygen saturation 96% on room air    /80   Pulse 82   Temp 99 1 °F (37 3 °C)   Resp 18   Ht 5' 7" (1 702 m)   Wt 113 kg (250 lb)   SpO2 96%   BMI 39 16 kg/m²   General appearance: alert and oriented, in no acute distress and cooperative  Ears: normal TM's and external ear canals both ears  Nose: scant discharge, moderate congestion, turbinates swollen, sinus tenderness mild bilateral frontal and maxillary  Throat: lips, mucosa, and tongue normal; teeth and gums normal and scant postnasal drip  Lungs: clear to auscultation bilaterally  Heart: regular rate and rhythm, S1, S2 normal, no murmur, click, rub or gallop  Abdomen: soft, non-tender; bowel sounds normal; no masses,  no organomegaly  Skin: Skin color, texture, turgor normal  No rashes or lesions  Lymph nodes: Cervical, supraclavicular, and axillary nodes normal     Assessment/Plan     Viral URI with Cough    - Conservative measures discussed including humidifier use for congestion and warm water/tea with honey for sore throat  - Discussed the importance of avoiding unnecessary antibiotic therapy  - Nasal saline spray / neti pot for congestion    - Also discussed mucinex for chest and sinus congestion  The mucinex combination cold medication she is taking only has 200 mg per tablet, with a dose of 2 tablets this is only 400 mg  Regular mucinex is 600 mg per tablet, with an effective dose of 1200 mg q12h  Advised to try regular mucinex for her severe congestion as it will provide a more effective dose and will help thin her mucus secretions  - Tessalon pearls 200 mg po tid prn for cough ordered  - Call on Monday (in 5 days) if symptoms haven't improved  - Call if shortness of breath worsens, blood in sputum, change in character of cough, development of fever or chills, inability to maintain nutrition and hydration  Avoid exposure to tobacco smoke and fumes    - Follow up prn  All patient questions & concerns were addressed  The patient agrees with her treatment plan  RTO prn      Ha Friedman DO  11/21/19  2:19 PM    Some portions of this record may have been generated with voice recognition software  There may be translation, syntax, or grammatical errors  Occasional wrong word or "sound-a-like" substitutions may have occurred due to the inherent limitations of the voice recognition software  Read the chart carefully and recognize, using context, where substations may have occurred   If you have any questions, please contact the dictating provider for clarification or correction, as needed

## 2019-11-25 ENCOUNTER — TELEPHONE (OUTPATIENT)
Dept: FAMILY MEDICINE CLINIC | Facility: CLINIC | Age: 59
End: 2019-11-25

## 2019-11-25 DIAGNOSIS — J01.10 ACUTE NON-RECURRENT FRONTAL SINUSITIS: Primary | ICD-10-CM

## 2019-11-25 RX ORDER — AZITHROMYCIN 250 MG/1
TABLET, FILM COATED ORAL
Qty: 6 TABLET | Refills: 0 | Status: SHIPPED | OUTPATIENT
Start: 2019-11-25 | End: 2019-11-29

## 2019-11-25 NOTE — TELEPHONE ENCOUNTER
At this point, it would be okay for the patient to have a Z-justin  She has been sick for quite some time now and since she is not better and going away, will presumptively treat for bacterial infection / sinusitis  Will send this to patient's preferred pharmacy [facility] in [place])  Will try to call patient when I have time but I can't guarantee that would be today      Thank you,    Mulu Calvillo,   11/25/19  10:39 AM

## 2019-11-25 NOTE — TELEPHONE ENCOUNTER
DR Jacques Patel - YOU SAW PT LAST WEEK  PT IS NOT ANY BETTER  SHE SAID YOU TOLD HER IF SHE WASN'T BETTER TO CALL AND YOU WOULD GIVE HER AN ANTIBIOTIC  SHE IS LEAVING FOR A CRUISE ON WED AND WOULD LIKE THIS BEFORE SHE LEAVES  SHE WOULD LIKE CALL BACK

## 2020-01-15 ENCOUNTER — OFFICE VISIT (OUTPATIENT)
Dept: OBGYN CLINIC | Facility: CLINIC | Age: 60
End: 2020-01-15
Payer: COMMERCIAL

## 2020-01-15 VITALS
DIASTOLIC BLOOD PRESSURE: 88 MMHG | HEART RATE: 72 BPM | BODY MASS INDEX: 40.53 KG/M2 | SYSTOLIC BLOOD PRESSURE: 167 MMHG | WEIGHT: 258.2 LBS | HEIGHT: 67 IN

## 2020-01-15 DIAGNOSIS — M75.81 ROTATOR CUFF TENDONITIS, RIGHT: ICD-10-CM

## 2020-01-15 DIAGNOSIS — M75.21 BICEPS TENDONITIS ON RIGHT: ICD-10-CM

## 2020-01-15 DIAGNOSIS — M24.811 INTERNAL DERANGEMENT OF RIGHT SHOULDER: Primary | ICD-10-CM

## 2020-01-15 PROCEDURE — 99214 OFFICE O/P EST MOD 30 MIN: CPT | Performed by: ORTHOPAEDIC SURGERY

## 2020-01-15 NOTE — PROGRESS NOTES
Assessment/Plan:  1  Internal derangement of right shoulder  MRI shoulder right wo contrast   2  Rotator cuff tendonitis, right  MRI shoulder right wo contrast   3  Biceps tendonitis on right  MRI shoulder right wo contrast       Scribe Attestation    I,:   Jennyfer Lewis am acting as a scribe while in the presence of the attending physician :        I,:   Kathryn Manzano MD personally performed the services described in this documentation    as scribed in my presence :              Triny upon examination and review of the x-rays of the right shoulder from July 2019 does give me concern for a potential rotator cuff tear  She does demonstrate weakness into abduction as well as a positive drop-arm sign  She does also demonstrate weakness with speed's test indicating possible pathology to the biceps long head tendon  Given that she has failed to have relief with corticosteroid injection, as well as physician directed exercise programs I do feel that is reasonable to order an MRI of the right shoulder to question a rotator cuff tear  I did provide her a prescription for this today  My office will help facilitate this appointment prior to leaving the office today  She may continue with the physician directed exercise programs previously learned as tolerated  I would like to see Triny back when she has the MRI completed  Subjective:   Sulma Harley is a 62 y o  female who presents to the office today for initial evaluation of her right shoulder  She has had activity related pain for over a year into her shoulder  She describes the pain as an intermittent and moderate ache that can become sharp at times with overhead reaching activities  She does have a history of left shoulder rotator cuff repair performed 1/3/2019  She notes that she did do physician directed exercise programs for her left shoulder that she did learn for her right shoulder postoperatively    She states that this did not provide her with any significant relief  In July 2019 she was evaluated by her rheumatologist who did visualize a tear into the rotator cuff with the use of the ultrasound unit  He perform a corticosteroid injection to her shoulder and she does believe that it was into the biceps tendon  She notes that this did provide her with significant relief but notes that her painful symptoms have returned  She does also remark that she is currently on Plaquenil, as well as a daily meloxicam, or tramadol  She does describe intermittent nerve pain that affects both the upper and lower extremities with no definitive exacerbating factor  She does not have a diagnosis to this nerve pain however notes that the pain can last for several days to a week  She notes her most recent flare was 2 days ago, today she denies any distal paresthesias  Review of Systems   Constitutional: Negative for activity change, chills, fever and unexpected weight change  HENT: Negative for hearing loss, nosebleeds and sore throat  Eyes: Negative for pain, redness and visual disturbance  Respiratory: Negative for cough, shortness of breath and wheezing  Cardiovascular: Negative for chest pain, palpitations and leg swelling  Gastrointestinal: Negative for abdominal pain, nausea and vomiting  Endocrine: Negative for polydipsia and polyuria  Genitourinary: Negative for dysuria and hematuria  Musculoskeletal:        See HPI   Skin: Negative for rash and wound  Neurological: Negative for dizziness, numbness and headaches  Psychiatric/Behavioral: Negative for decreased concentration and suicidal ideas  The patient is not nervous/anxious            Past Medical History:   Diagnosis Date    Diverticulitis, colon     Hypertension     Hypothyroidism     h/o " borderline "  issues    Liver cyst     last assessed 4/18/16    Lyme disease     Positive Anaplasma serology     RA (rheumatoid arthritis) ([facility] [address] )     Seizure disorder in pregnancy ([address] )     had 1 seizure x1 1986 during pregnancy- none after    Varicose vein of leg        Past Surgical History:   Procedure Laterality Date    BACK SURGERY      fusion/refusion of 2-8 vertebrae    COLECTOMY      partial sigmoid, last assessed 4/18/16    JOINT REPLACEMENT      LAPAROSCOPY      with adnexectomy    LIVER SURGERY      SD SHLDR ARTHROSCOP,SURG,W/ROTAT CUFF REPR Left 1/3/2019    Procedure: REPAIR ROTATOR CUFF  ARTHROSCOPIC, BICEPTS TENODESIS, SUBACROMIAL DECOMPRESION;  Surgeon: Swapna Mooney MD;  [facility];  Service: Orthopedics    REPLACEMENT TOTAL KNEE Bilateral     SINUS SURGERY      TONSILLECTOMY      TYMPANOSTOMY TUBE PLACEMENT         Family History   Problem Relation Age of Onset    Hypertension Mother     Pancreatic cancer Mother 78    Arthritis Father     Hyperthyroidism Father     Hypertension Father     No Known Problems Sister     No Known Problems Brother     No Known Problems Maternal Uncle     No Known Problems Paternal Aunt     No Known Problems Paternal Uncle     No Known Problems Maternal Grandmother     No Known Problems Maternal Grandfather     No Known Problems Paternal Grandmother     No Known Problems Paternal Grandfather     No Known Problems Daughter     No Known Problems Daughter     No Known Problems Daughter     No Known Problems Maternal Aunt     ADD / ADHD Neg Hx     Anesthesia problems Neg Hx     Cancer Neg Hx     Clotting disorder Neg Hx     Collagen disease Neg Hx     Diabetes Neg Hx     Dislocations Neg Hx     Learning disabilities Neg Hx     Neurological problems Neg Hx     Osteoporosis Neg Hx     Rheumatologic disease Neg Hx     Scoliosis Neg Hx     Vascular Disease Neg Hx        Social History     Occupational History    Not on file   Tobacco Use    Smoking status: Never Smoker    Smokeless tobacco: Never Used   Substance and Sexual Activity    Alcohol use: No    Drug use: No    Sexual activity: Not on file         Current Outpatient Medications:     atenolol (TENORMIN) 100 mg tablet, Take 1 tablet (100 mg total) by mouth daily, Disp: 30 tablet, Rfl: 6    hydrochlorothiazide (HYDRODIURIL) 25 mg tablet, Take 1 tablet (25 mg total) by mouth daily, Disp: 30 tablet, Rfl: 6    hydroxychloroquine (PLAQUENIL) 200 mg tablet, Take 200 mg by mouth 2 (two) times a day with meals, Disp: , Rfl: 0    loratadine (CLARITIN) 10 mg tablet, Take 1 tablet (10 mg total) by mouth daily, Disp: 90 tablet, Rfl: 3    meloxicam (MOBIC) 15 mg tablet, Take by mouth as needed , Disp: , Rfl:     RESTASIS MULTIDOSE 0 05 % ophthalmic emulsion, Administer to both eyes every 12 (twelve) hours  , Disp: , Rfl:     traMADol (ULTRAM) 50 mg tablet, as needed, Disp: , Rfl: 0    benzonatate (TESSALON) 200 MG capsule, Take 1 capsule (200 mg total) by mouth 3 (three) times a day as needed for cough (Patient not taking: Reported on 1/15/2020), Disp: 30 capsule, Rfl: 1    fluticasone (FLONASE) 50 mcg/act nasal spray, 2 sprays into each nostril daily (Patient not taking: Reported on 3/13/2019), Disp: 16 g, Rfl: 3    Allergies   Allergen Reactions    Pollen Extract Other (See Comments)     headaches       Objective:  Vitals:    01/15/20 1045   BP: 167/88   Pulse: 72       Right Shoulder Exam     Tenderness   The patient is experiencing tenderness in the biceps tendon (Periscapular region)  Range of Motion   Active abduction: 150   External rotation: 70   Internal rotation 0 degrees: Sacrum     Muscle Strength   Abduction: 4/5   Internal rotation: 5/5   External rotation: 5/5   Biceps: 4/5     Tests   Perez test: positive  Impingement: positive  Drop arm: positive    Other   Erythema: absent  Scars: absent  Sensation: normal  Pulse: present            Physical Exam   Constitutional: She is oriented to person, place, and time  She appears well-developed and well-nourished  HENT:   Head: Normocephalic and atraumatic     Eyes: Conjunctivae are normal  Right eye exhibits no discharge  Left eye exhibits no discharge  Neck: Normal range of motion  Neck supple  Cardiovascular: Normal rate and intact distal pulses  Pulmonary/Chest: Effort normal  No respiratory distress  Musculoskeletal:   As noted in HPI   Neurological: She is alert and oriented to person, place, and time  Skin: Skin is warm and dry  Psychiatric: She has a normal mood and affect  Her behavior is normal  Judgment and thought content normal    Vitals reviewed  I have personally reviewed pertinent films in PACS and my interpretation is as follows:    X-rays of the right shoulder demonstrates no acute fracture  There is a multiple subcortical cyst into the humeral head however they appear to be benign  Calcification adjacent to the coracoid process

## 2020-01-20 ENCOUNTER — TELEPHONE (OUTPATIENT)
Dept: OBGYN CLINIC | Facility: HOSPITAL | Age: 60
End: 2020-01-20

## 2020-01-20 NOTE — TELEPHONE ENCOUNTER
Patient sees Dr Ramiro Finch   She has an MRI scheduled for tomorrow 1/21 and she wanted to check on the status of her insurance approval       CB: [phone]

## 2020-01-22 DIAGNOSIS — I10 ESSENTIAL HYPERTENSION: ICD-10-CM

## 2020-01-23 RX ORDER — ATENOLOL 100 MG/1
100 TABLET ORAL DAILY
Qty: 30 TABLET | Refills: 6 | Status: SHIPPED | OUTPATIENT
Start: 2020-01-23 | End: 2020-03-20 | Stop reason: SDUPTHER

## 2020-01-23 RX ORDER — HYDROCHLOROTHIAZIDE 25 MG/1
25 TABLET ORAL DAILY
Qty: 30 TABLET | Refills: 6 | Status: SHIPPED | OUTPATIENT
Start: 2020-01-23 | End: 2020-08-30

## 2020-01-24 ENCOUNTER — TELEPHONE (OUTPATIENT)
Dept: OBGYN CLINIC | Facility: CLINIC | Age: 60
End: 2020-01-24

## 2020-01-24 NOTE — TELEPHONE ENCOUNTER
----- Message from Ghada Bowers PA-C sent at 1/24/2020  8:09 AM EST -----  Multiple rotator cuff tears  FU in office    ----- Message -----  From: Deisy Brown Edco  Sent: 1/23/2020  12:09 AM EST  To: Ady Stokes MD

## 2020-01-29 ENCOUNTER — OFFICE VISIT (OUTPATIENT)
Dept: OBGYN CLINIC | Facility: CLINIC | Age: 60
End: 2020-01-29
Payer: COMMERCIAL

## 2020-01-29 VITALS
SYSTOLIC BLOOD PRESSURE: 170 MMHG | HEART RATE: 57 BPM | WEIGHT: 251 LBS | DIASTOLIC BLOOD PRESSURE: 82 MMHG | BODY MASS INDEX: 39.39 KG/M2 | HEIGHT: 67 IN

## 2020-01-29 DIAGNOSIS — S46.111D RUPTURE LONG HEAD BICEPS TENDON, RIGHT, SUBSEQUENT ENCOUNTER: ICD-10-CM

## 2020-01-29 DIAGNOSIS — M75.121 COMPLETE TEAR OF RIGHT ROTATOR CUFF, UNSPECIFIED WHETHER TRAUMATIC: Primary | ICD-10-CM

## 2020-01-29 PROCEDURE — 99214 OFFICE O/P EST MOD 30 MIN: CPT | Performed by: ORTHOPAEDIC SURGERY

## 2020-01-29 NOTE — PROGRESS NOTES
Assessment/Plan:  1  Complete tear of right supraspinatus, and subscapularis, unspecified whether traumatic  Ambulatory referral to Physical Therapy   2  Rupture long head biceps tendon, right, subsequent encounter         Scribe Attestation    I,:   Jeffrey Morely am acting as a scribe while in the presence of the attending physician :        I,:   Marilu Degroot MD personally performed the services described in this documentation    as scribed in my presence :              Triny upon examination and review of the MRI of the right shoulder does demonstrate complete tears of the supraspinatus and a high-grade tear of subscapularis tendons  There is also a complete tear of the biceps long head tendon  On clinical exam today she does have maintained range of motion overall, however is restricted secondary to pain and stiffness reaching behind her back  She does also demonstrate positive drop-arm sign, and empty can  She is strong to strength testing of the subscapularis however painful  Given the findings on her clinical exam as well as her MRI, I did remark to Triny that she is a candidate for a right shoulder arthroscopy with rotator cuff repair of the supraspinatus, and subscapularis, and subacromial decompression  I did discuss the procedure, expected recovery, and its associated risks including but not limited to bleeding, infection, nerve injury resulting weakness and pain, blood clots, failure surgery, stiffness, increased pain, recurrent injury, and need for further surgery  Triny did verbalize understanding to all the information provided to her today, and did provide signed consent for a right shoulder arthroscopy with rotator cuff repair of the supraspinatus, and subscapularis tendons with subacromial decompression  She will be fit with a postoperative sling by my DME fitter today  She will also meet with my surgical scheduler to set up a date and time with expectation of early March  Triny will require preoperative clearance is from her primary care physician, cardiologist   I will see Triny back on the date of her surgery  Subjective:   Brett Ibarra is a 62 y o  female who presents to the office today for follow-up evaluation of her right shoulder  She has been experiencing activity related pain for over a year into her shoulder  She notes that her pain is in moderate ache that can extend down into the upper arm, and forearm  She does also experience pain to the lateral aspect of her elbow that she feels is due to compensation  She does also remark on painful symptoms into her right upper trapezius extending into her cervical spine  This has resulted in tension headaches, that she notes can be more painful than her shoulder  She still having pain with overhead reaching activities however notes she is able to buckle her brought  She notes that weight-bearing with her right upper extremity such as to help bring and groceries will result in painful symptoms afterwards  She is currently on Plaquenil, as well as the daily meloxicam or tramadol depending on her symptoms  She does remark on undiagnosed neurologic pain affecting both the upper and lower extremities with no specific exacerbating factor  Today she denies any distal paresthesias  Triny did have an MRI of the right shoulder completed to be reviewed today  Review of Systems   Constitutional: Negative for activity change, chills, fever and unexpected weight change  HENT: Negative for hearing loss, nosebleeds and sore throat  Eyes: Negative for pain, redness and visual disturbance  Respiratory: Negative for cough, shortness of breath and wheezing  Cardiovascular: Negative for chest pain, palpitations and leg swelling  Gastrointestinal: Negative for abdominal pain, nausea and vomiting  Endocrine: Negative for polydipsia and polyuria  Genitourinary: Negative for dysuria and hematuria  Musculoskeletal: Positive for arthralgias and myalgias  See HPI   Skin: Negative for rash and wound  Neurological: Negative for dizziness, numbness and headaches  Psychiatric/Behavioral: Negative for decreased concentration and suicidal ideas  The patient is not nervous/anxious            Past Medical History:   Diagnosis Date    Diverticulitis, colon     Hypertension     Hypothyroidism     h/o " borderline "  issues    Liver cyst     last assessed 4/18/16    Lyme disease     Positive Anaplasma serology     [facility]     Seizure disorder in pregnancy ([facility] )     had 1 seizure x1 1986 during pregnancy- none after    Varicose vein of leg        Past Surgical History:   Procedure Laterality Date    BACK SURGERY      fusion/refusion of 2-8 vertebrae    COLECTOMY      partial sigmoid, last assessed 4/18/16    JOINT REPLACEMENT      LAPAROSCOPY      with adnexectomy    LIVER SURGERY      [place] SHLDR ARTHROSCOP,SURG,W/ROTAT CUFF REPR Left 1/3/2019    Procedure: REPAIR ROTATOR CUFF  ARTHROSCOPIC, BICEPTS TENODESIS, SUBACROMIAL DECOMPRESION;  Surgeon: Deb Cain MD;  Location: [facility];  Service: Orthopedics    REPLACEMENT TOTAL KNEE Bilateral     SINUS SURGERY      TONSILLECTOMY      TYMPANOSTOMY TUBE PLACEMENT         Family History   Problem Relation Age of Onset    Hypertension Mother     Pancreatic cancer Mother 78    Arthritis Father     Hyperthyroidism Father     Hypertension Father     No Known Problems Sister     No Known Problems Brother     No Known Problems Maternal Uncle     No Known Problems Paternal Aunt     No Known Problems Paternal Uncle     No Known Problems Maternal Grandmother     No Known Problems Maternal Grandfather     No Known Problems Paternal Grandmother     No Known Problems Paternal Grandfather     No Known Problems Daughter     No Known Problems Daughter     No Known Problems Daughter     No Known Problems Maternal Aunt  ADD / ADHD Neg Hx     Anesthesia problems Neg Hx     Cancer Neg Hx     Clotting disorder Neg Hx     Collagen disease Neg Hx     Diabetes Neg Hx     Dislocations Neg Hx     Learning disabilities Neg Hx     Neurological problems Neg Hx     Osteoporosis Neg Hx     Rheumatologic disease Neg Hx     Scoliosis Neg Hx     Vascular Disease Neg Hx        Social History     Occupational History    Not on file   Tobacco Use    Smoking status: Never Smoker    Smokeless tobacco: Never Used   Substance and Sexual Activity    Alcohol use: No    Drug use: No    Sexual activity: Not on file         Current Outpatient Medications:     atenolol (TENORMIN) 100 mg tablet, Take 1 tablet (100 mg total) by mouth daily, Disp: 30 tablet, Rfl: 6    hydrochlorothiazide (HYDRODIURIL) 25 mg tablet, Take 1 tablet (25 mg total) by mouth daily, Disp: 30 tablet, Rfl: 6    hydroxychloroquine (PLAQUENIL) 200 mg tablet, Take 200 mg by mouth 2 (two) times a day with meals, Disp: , Rfl: 0    loratadine (CLARITIN) 10 mg tablet, Take 1 tablet (10 mg total) by mouth daily, Disp: 90 tablet, Rfl: 3    meloxicam (MOBIC) 15 mg tablet, Take by mouth as needed , Disp: , Rfl:     RESTASIS MULTIDOSE 0 05 % ophthalmic emulsion, Administer to both eyes every 12 (twelve) hours  , Disp: , Rfl:     traMADol (ULTRAM) 50 mg tablet, as needed, Disp: , Rfl: 0    benzonatate (TESSALON) 200 MG capsule, Take 1 capsule (200 mg total) by mouth 3 (three) times a day as needed for cough (Patient not taking: Reported on 1/15/2020), Disp: 30 capsule, Rfl: 1    fluticasone (FLONASE) 50 mcg/act nasal spray, 2 sprays into each nostril daily (Patient not taking: Reported on 3/13/2019), Disp: 16 g, Rfl: 3    Allergies   Allergen Reactions    Pollen Extract Other (See Comments)     headaches       Objective:  Vitals:    01/29/20 1116   BP: 170/82   Pulse: 57       Right Shoulder Exam     Tenderness   The patient is experiencing tenderness in the biceps tendon  Range of Motion   Active abduction: 150   External rotation: 60   Internal rotation 0 degrees: L3     Muscle Strength   Abduction: 4/5   Internal rotation: 5/5   External rotation: 5/5     Other   Erythema: absent  Scars: absent  Sensation: normal  Pulse: present    Comments:  Drop arm sign: positive    Empty can: positive            Physical Exam   Constitutional: She is oriented to person, place, and time  She appears well-developed and well-nourished  HENT:   Head: Normocephalic and atraumatic  Eyes: Conjunctivae are normal  Right eye exhibits no discharge  Left eye exhibits no discharge  Neck: Normal range of motion  Neck supple  Cardiovascular: Normal rate, normal heart sounds and intact distal pulses  Pulmonary/Chest: Effort normal and breath sounds normal  No respiratory distress  Neurological: She is alert and oriented to person, place, and time  Skin: Skin is warm and dry  Psychiatric: She has a normal mood and affect  Her behavior is normal  Judgment and thought content normal    Vitals reviewed  I have personally reviewed pertinent films in PACS and my interpretation is as follows:    MRI of the right shoulder demonstrates a full thickness tear of the distal supraspinatus with 2 cm retraction and no signs of muscle atrophy  High grade tear of the distal subscapularis tendon  Long head biceps tendon tear is demonstrated

## 2020-02-03 PROBLEM — Z01.810 PRE-OPERATIVE CARDIOVASCULAR EXAMINATION: Status: ACTIVE | Noted: 2020-02-03

## 2020-02-03 NOTE — PROGRESS NOTES
Consult note - Cardiology Office  [facility]    Triny Dang 62 y o  female MRN: [id number]  : 1960  Encounter: [id number]      Assessment:     1  Pre-operative cardiovascular examination    2  Exertional shortness of breath    3  Essential hypertension    4  EKG abnormality    5  Obesity (BMI 35 0-39 9 without comorbidity)    6  Complete tear of right rotator cuff, unspecified whether traumatic    7  Rupture long head biceps tendon, right, subsequent encounter    8  History of bilateral knee replacement        Discussion Summary and Plan:    1  Exertional shortness of breath  Patient has chronic exertional shortness of breath  Most likely related to her obesity and deconditioning as she is not active due to multiple arthritic problem  She had history of bilateral knee replacement surgery, back surgery  Currently she needs shoulder surgery  Her EKG shows nonspecific ST changes she will be scheduled for Lexiscan stress test   Echo reviewed    2  Preoperative clearance for cardiovascular examination  Lexiscan stress test has been ordered    3  Essential hypertension  Need better controlled  Patient is tolerating her atenolol and hydrochlorothiazide  Potassium is acceptable will start amlodipine 2 5 milligram daily  4  EKG abnormality  She had EKG showing LVH with nonspecific ST changes  She cannot walk on the treadmill she will be scheduled for Lexiscan stress test    5  Obesity with BMI around 38  She has gained weight over the years  She is not active due to her arthritic problems  Advised her to be active after surgery is completed  Need to lose weight    6  History of multiple arthritic problem including history of back surgery knee surgery and now need surgery on her rotator cuff  Management as per orthopedics    7  Dyslipidemia with low cholesterol low HDL  Her risk for cardiovascular event is around 5-6 percent    Will need to update blood test to better assess her risk for cardiovascular event  All those issues discussed with patient Lexiscan stress test scheduled amlodipine added further plan as also of these are become available  Dietary changes advised  Please call [phone] if any questions  Counseling :  A description of the counseling  Goals and Barriers  Patient's ability to self care: Yes  Medication side effect reviewed with patient in detail and all their questions answered to their satisfaction  HPI :     Trinidad Huff is a 62y o  year old female who was referred for preoperative clearance  Patient who has a past medical history significant for hypertension, LVH on EKG, history of exertional shortness of breath was seen by my associate in March of 2019  She had history of hypertension since she was 40-year-old and had been on atenolol and hydrochlorothiazide  Today her blood pressure is elevated on Examination by me  She also history of rheumatoid arthritis and she take Plaquenil  She can walk about 1-2 miles without any problems  No fever no chills no nausea no vomiting no other issues  She does not smoke  There is no family history of premature coronary artery disease  Her cholesterol profile is acceptable it was done in 2018  She has decently active but she has lot of arthritic problem including history of back surgery and neck issues hence decreasing her activities she also history of bilateral knee replacement surgery  She has gained some weight her BMI now around 38  She used to be very active due to her history of knee replacement other problem she is not active at all and that has made her not do exercise as she due to about 10 years ago  Review of Systems   Constitutional: Negative for activity change, chills, diaphoresis, fever and unexpected weight change  HENT: Negative for congestion  Eyes: Negative for discharge and redness  Respiratory: Positive for shortness of breath   Negative for cough, chest tightness and wheezing  Cardiovascular: Negative  Negative for chest pain, palpitations and leg swelling  Gastrointestinal: Negative for abdominal pain, diarrhea and nausea  Endocrine: Negative  Genitourinary: Negative for decreased urine volume and urgency  Musculoskeletal: Positive for arthralgias, back pain and gait problem  History of bilateral knee replacement surgery, history of back surgery, has neck issue difficulty walking   Skin: Negative for rash and wound  Allergic/Immunologic: Negative  Neurological: Negative for dizziness, seizures, syncope, weakness, light-headedness and headaches  Hematological: Negative  Psychiatric/Behavioral: Negative for agitation and confusion  The patient is nervous/anxious          Historical Information   Past Medical History:   Diagnosis Date    Diverticulitis, colon     Hypertension     Hypothyroidism     h/o " borderline "  issues    Liver cyst     last assessed 4/18/16    Lyme disease     Positive Anaplasma serology     [facility]     Seizure disorder in pregnancy ([facility] )     had 1 seizure x1 1986 during pregnancy- none after    Varicose vein of leg      Past Surgical History:   Procedure Laterality Date    BACK SURGERY      fusion/refusion of 2-8 vertebrae    COLECTOMY      partial sigmoid, last assessed 4/18/16    JOINT REPLACEMENT      LAPAROSCOPY      with adnexectomy    LIVER SURGERY      HI SHLDR ARTHROSCOP,SURG,W/ROTAT CUFF REPR Left 1/3/2019    Procedure: REPAIR ROTATOR CUFF  ARTHROSCOPIC, BICEPTS TENODESIS, SUBACROMIAL DECOMPRESION;  Surgeon: Ana Toledo MD;  Location: [facility];  Service: Orthopedics    REPLACEMENT TOTAL KNEE Bilateral     SINUS SURGERY      TONSILLECTOMY      TYMPANOSTOMY TUBE PLACEMENT       Social History     Substance and Sexual Activity   Alcohol Use No     Social History     Substance and Sexual Activity   Drug Use No     Social History     Tobacco Use Smoking Status Never Smoker   Smokeless Tobacco Never Used     Family History:   Family History   Problem Relation Age of Onset    Hypertension Mother     Pancreatic cancer Mother 78    Arthritis Father     Hyperthyroidism Father     Hypertension Father     No Known Problems Sister     No Known Problems Brother     No Known Problems Maternal Uncle     No Known Problems Paternal Aunt     No Known Problems Paternal Uncle     No Known Problems Maternal Grandmother     No Known Problems Maternal Grandfather     No Known Problems Paternal Grandmother     No Known Problems Paternal Grandfather     No Known Problems Daughter     No Known Problems Daughter     No Known Problems Daughter     No Known Problems Maternal Aunt     ADD / ADHD Neg Hx     Anesthesia problems Neg Hx     Cancer Neg Hx     Clotting disorder Neg Hx     Collagen disease Neg Hx     Diabetes Neg Hx     Dislocations Neg Hx     Learning disabilities Neg Hx     Neurological problems Neg Hx     Osteoporosis Neg Hx     Rheumatologic disease Neg Hx     Scoliosis Neg Hx     Vascular Disease Neg Hx        Meds/Allergies     Allergies   Allergen Reactions    Pollen Extract Other (See Comments)     headaches       Current Outpatient Medications:     atenolol (TENORMIN) 100 mg tablet, Take 1 tablet (100 mg total) by mouth daily, Disp: 30 tablet, Rfl: 6    fluticasone (FLONASE) 50 mcg/act nasal spray, 2 sprays into each nostril daily, Disp: 16 g, Rfl: 3    hydrochlorothiazide (HYDRODIURIL) 25 mg tablet, Take 1 tablet (25 mg total) by mouth daily, Disp: 30 tablet, Rfl: 6    hydroxychloroquine (PLAQUENIL) 200 mg tablet, Take 200 mg by mouth 2 (two) times a day with meals, Disp: , Rfl: 0    loratadine (CLARITIN) 10 mg tablet, Take 1 tablet (10 mg total) by mouth daily, Disp: 90 tablet, Rfl: 3    meloxicam (MOBIC) 15 mg tablet, Take by mouth daily , Disp: , Rfl:     Pseudoephedrine-Ibuprofen (ADVIL COLD & SINUS LIQUI-GELS PO), Take by mouth, Disp: , Rfl:     RESTASIS MULTIDOSE 0 05 % ophthalmic emulsion, Administer to both eyes every 12 (twelve) hours  , Disp: , Rfl:     traMADol (ULTRAM) 50 mg tablet, as needed, Disp: , Rfl: 0    amLODIPine (NORVASC) 2 5 mg tablet, Take 1 tablet (2 5 mg total) by mouth daily, Disp: 30 tablet, Rfl: 3    benzonatate (TESSALON) 200 MG capsule, Take 1 capsule (200 mg total) by mouth 3 (three) times a day as needed for cough (Patient not taking: Reported on 1/15/2020), Disp: 30 capsule, Rfl: 1    Vitals: Blood pressure 142/78, pulse 76, height 5' 7" (1 702 m), weight 112 kg (248 lb), SpO2 98 %  Body mass index is 38 84 kg/m²  Vitals:    20 1328   Weight: 112 kg (248 lb)     BP Readings from Last 3 Encounters:   20 142/78   20 170/82   01/15/20 167/88       Physical Exam:  Physical Exam    Neurologic:  Alert & oriented x 3, no new focal deficits, Not in any acute distress,  Constitutional:  Well developed, well nourished, non-toxic appearance   Eyes:  Pupil equal and reacting to light, conjunctiva normal,   HENT:  Atraumatic, oropharynx moist, Neck- normal range of motion, no tenderness,  Neck supple, No JVP, No LNP   Respiratory:  Bilateral air entry, mostly clear to auscultation  Cardiovascular: S1-S2 regular with a 2/6 ejection systolic murmur and S4 is present  GI:  Soft, nondistended, normal bowel sounds, nontender, no hepatosplenomegaly appreciated  Musculoskeletal:  No edema, no tenderness, no deformities  Skin:  Well hydrated, no rash   Lymphatic:  No lymphadenopathy noted   Extremities:  No edema and distal pulses are present    Diagnostic Studies Review Cardio:    Echo Doppler reviewed    EK lead EKG 2020 shows normal sinus rhythm left axis deviation incomplete RBBB  T-wave inversion noted in lead 3 and AVF    Cardiac testing:   Results for orders placed during the hospital encounter of 19   Echo complete with contrast if indicated    [address] [place] [address]  [phone]    Transthoracic Echocardiogram  2D, M-mode, Doppler, and Color Doppler    Study date:  2019    Patient: Trent Moyer  MR number: [id number]  Account number: [de-identified]  : 1960  Age: 59 years  Gender: Female  Status: Outpatient  Location: Echo lab  Height: 67 in  Weight: 248  4 lb  BP: 124/ 72 mmHg    Indications: Hypertension    Diagnoses: 401 9 - HYPERTENSION NOS    Sonographer:  FLORIAN Joseph  Primary Physician:  Valentino Carls, MD  Referring Physician:  Asha Moreno DO  Group:  [id number]  Interpreting Physician:  Makenzie Ferris MD    SUMMARY    LEFT VENTRICLE:  Systolic function was normal  Ejection fraction was estimated in the range of 55 % to 60 % to be 55 %  There were no regional wall motion abnormalities  Wall thickness was mildly increased  Doppler parameters were consistent with abnormal left ventricular relaxation (grade 1 diastolic dysfunction)  LEFT ATRIUM:  The atrium was mildly dilated  TRICUSPID VALVE:  The tricuspid jet envelope definition was inadequate for estimation of RV systolic pressure  There are no indirect findings (abnormal RV volume or geometry, altered pulmonary flow velocity profile, or leftward septal displacement) which  would suggest moderate or severe pulmonary hypertension  PULMONIC VALVE:  There was mild regurgitation  HISTORY: PRIOR HISTORY: HTN    PROCEDURE: The procedure was performed in the echo lab  This was a routine study  The transthoracic approach was used  The study included complete 2D imaging, M-mode, complete spectral Doppler, and color Doppler  The heart rate was 64 bpm,  at the start of the study  Image quality was adequate  LEFT VENTRICLE: Size was normal  Systolic function was normal  Ejection fraction was estimated in the range of 55 % to 60 % to be 55 %  There were no regional wall motion abnormalities   Leora Leavitt thickness was mildly increased  No evidence of  apical thrombus  DOPPLER: Doppler parameters were consistent with abnormal left ventricular relaxation (grade 1 diastolic dysfunction)  RIGHT VENTRICLE: The size was normal  Systolic function was normal  Wall thickness was normal     LEFT ATRIUM: The atrium was mildly dilated  RIGHT ATRIUM: Size was normal     MITRAL VALVE: Valve structure was normal  There was normal leaflet separation  DOPPLER: The transmitral velocity was within the normal range  There was no evidence for stenosis  There was no significant regurgitation  AORTIC VALVE: The valve was trileaflet  Leaflets exhibited normal thickness and normal cuspal separation  DOPPLER: Transaortic velocity was within the normal range  There was no evidence for stenosis  There was no significant  regurgitation  TRICUSPID VALVE: The valve structure was normal  There was normal leaflet separation  DOPPLER: The transtricuspid velocity was within the normal range  There was no evidence for stenosis  There was no significant regurgitation  The  tricuspid jet envelope definition was inadequate for estimation of RV systolic pressure  There are no indirect findings (abnormal RV volume or geometry, altered pulmonary flow velocity profile, or leftward septal displacement) which would  suggest moderate or severe pulmonary hypertension  PULMONIC VALVE: Leaflets exhibited normal thickness, no calcification, and normal cuspal separation  DOPPLER: The transpulmonic velocity was within the normal range  There was mild regurgitation  PERICARDIUM: There was no pericardial effusion  The pericardium was normal in appearance  AORTA: The root exhibited normal size  SYSTEMIC VEINS: IVC: The inferior vena cava was normal in size      SYSTEM MEASUREMENT TABLES    2D mode  AoR Diam 2D: 3 3 cm  LA Diam (2D): 4 7 cm  LA/Ao (2D): 1 42  FS (2D Teich): 26 3 %  IVSd (2D): 1 26 cm  LVDEV: 139 cmï¾³  LVESV: 67 9 cmï¾³  LVIDd(2D): 5 36 cm  LVISd (2D): 3 95 cm  LVPWd (2D): 1 26 cm  SV (Teich): 71 1 cmï¾³    Apical four chamber  LVEF A4C: 53 %    Unspecified Scan Mode  MV Peak A Pipe: 948 mm/s  MV Peak E Pipe  Mean: 827 mm/s  MVA (PHT): 3 55 cmï¾²  PHT: 62 ms  RA Area: 16 6 cmï¾²  RA Volume: 36 cmï¾³  TAPSE: 2 cm    [employer] Accredited Echocardiography Laboratory    Prepared and electronically signed by    Alea Perez MD  Signed 10-Apr-2019 12:18:17         Lab Review   Lab Results   Component Value Date    WBC 3 1 (L) 01/31/2020    HGB 13 0 01/31/2020    HCT 38 3 01/31/2020    MCV 93 01/31/2020    RDW 14 3 01/31/2020     01/31/2020     BMP:  Lab Results   Component Value Date    SODIUM 141 01/31/2020    K 4 4 01/31/2020     01/31/2020    CO2 22 01/31/2020    BUN 16 01/31/2020    CREATININE 0 79 01/31/2020    GLUC 94 01/31/2020    GLUF 93 12/07/2018    CALCIUM 9 3 12/07/2018    EGFR 83 12/07/2018     LFT:  Lab Results   Component Value Date    AST 23 12/07/2018    ALT 33 12/07/2018    ALKPHOS 111 12/07/2018    TP 7 3 12/07/2018    ALB 4 1 12/07/2018      No results found for: [facility]  Lab Results   Component Value Date    HGBA1C 5 4 05/30/2018     Lipid Profile:   Lab Results   Component Value Date    CHOLESTEROL 159 05/30/2018    HDL 44 05/30/2018    LDLCALC 111 06/10/2016    TRIG 95 05/30/2018     Lab Results   Component Value Date    CHOLESTEROL 159 05/30/2018         Past EKG with T-wave inversions in inferior leads now slightly more prominent T-wave inversions in lead AVF also  LVH by voltage      Dr Colletta Croissant, MD [facility]      "This note has been constructed using a voice recognition system  Therefore there may be syntax, spelling, and/or grammatical errors   Please call if you have any questions  "

## 2020-02-04 ENCOUNTER — CONSULT (OUTPATIENT)
Dept: CARDIOLOGY CLINIC | Facility: CLINIC | Age: 60
End: 2020-02-04
Payer: COMMERCIAL

## 2020-02-04 VITALS
HEIGHT: 67 IN | WEIGHT: 248 LBS | BODY MASS INDEX: 38.92 KG/M2 | HEART RATE: 76 BPM | OXYGEN SATURATION: 98 % | DIASTOLIC BLOOD PRESSURE: 78 MMHG | SYSTOLIC BLOOD PRESSURE: 142 MMHG

## 2020-02-04 DIAGNOSIS — R94.31 EKG ABNORMALITY: ICD-10-CM

## 2020-02-04 DIAGNOSIS — Z96.653 HISTORY OF BILATERAL KNEE REPLACEMENT: ICD-10-CM

## 2020-02-04 DIAGNOSIS — I10 ESSENTIAL HYPERTENSION: ICD-10-CM

## 2020-02-04 DIAGNOSIS — M75.121 COMPLETE TEAR OF RIGHT ROTATOR CUFF, UNSPECIFIED WHETHER TRAUMATIC: ICD-10-CM

## 2020-02-04 DIAGNOSIS — R06.02 EXERTIONAL SHORTNESS OF BREATH: ICD-10-CM

## 2020-02-04 DIAGNOSIS — Z01.810 PRE-OPERATIVE CARDIOVASCULAR EXAMINATION: ICD-10-CM

## 2020-02-04 DIAGNOSIS — S46.111D RUPTURE LONG HEAD BICEPS TENDON, RIGHT, SUBSEQUENT ENCOUNTER: ICD-10-CM

## 2020-02-04 DIAGNOSIS — E66.9 OBESITY (BMI 35.0-39.9 WITHOUT COMORBIDITY): ICD-10-CM

## 2020-02-04 PROCEDURE — 93000 ELECTROCARDIOGRAM COMPLETE: CPT | Performed by: INTERNAL MEDICINE

## 2020-02-04 PROCEDURE — 99204 OFFICE O/P NEW MOD 45 MIN: CPT | Performed by: INTERNAL MEDICINE

## 2020-02-04 RX ORDER — AMLODIPINE BESYLATE 2.5 MG/1
2.5 TABLET ORAL DAILY
Qty: 30 TABLET | Refills: 3 | Status: SHIPPED | OUTPATIENT
Start: 2020-02-04 | End: 2020-02-13

## 2020-02-11 ENCOUNTER — HOSPITAL ENCOUNTER (OUTPATIENT)
Dept: RADIOLOGY | Facility: HOSPITAL | Age: 60
Discharge: HOME | End: 2020-02-11
Attending: INTERNAL MEDICINE
Payer: COMMERCIAL

## 2020-02-11 ENCOUNTER — HOSPITAL ENCOUNTER (OUTPATIENT)
Dept: NON INVASIVE DIAGNOSTICS | Facility: HOSPITAL | Age: 60
Discharge: HOME | End: 2020-02-11
Attending: INTERNAL MEDICINE
Payer: COMMERCIAL

## 2020-02-11 DIAGNOSIS — I10 ESSENTIAL HYPERTENSION: ICD-10-CM

## 2020-02-11 DIAGNOSIS — Z01.810 PRE-OPERATIVE CARDIOVASCULAR EXAMINATION: ICD-10-CM

## 2020-02-11 DIAGNOSIS — R06.02 EXERTIONAL SHORTNESS OF BREATH: ICD-10-CM

## 2020-02-11 DIAGNOSIS — R94.31 EKG ABNORMALITY: ICD-10-CM

## 2020-02-11 LAB
CHEST PAIN STATEMENT: NORMAL
MAX DIASTOLIC BP: 82 MMHG
MAX HEART RATE: 134 BPM
MAX PREDICTED HEART RATE: 159 BPM
MAX. SYSTOLIC BP: 176 MMHG
PROTOCOL NAME: NORMAL
TARGET HR FORMULA: NORMAL
TIME IN EXERCISE PHASE: NORMAL

## 2020-02-11 PROCEDURE — 78452 HT MUSCLE IMAGE SPECT MULT: CPT

## 2020-02-11 PROCEDURE — 93017 CV STRESS TEST TRACING ONLY: CPT

## 2020-02-11 PROCEDURE — A9502 TC99M TETROFOSMIN: HCPCS

## 2020-02-12 ENCOUNTER — TELEPHONE (OUTPATIENT)
Dept: CARDIOLOGY CLINIC | Facility: CLINIC | Age: 60
End: 2020-02-12

## 2020-02-12 PROCEDURE — 78452 HT MUSCLE IMAGE SPECT MULT: CPT | Performed by: INTERNAL MEDICINE

## 2020-02-12 PROCEDURE — 93016 CV STRESS TEST SUPVJ ONLY: CPT | Performed by: INTERNAL MEDICINE

## 2020-02-12 PROCEDURE — 93018 CV STRESS TEST I&R ONLY: CPT | Performed by: INTERNAL MEDICINE

## 2020-02-12 NOTE — TELEPHONE ENCOUNTER
----- Message from Sridhar Fink MD sent at 2/12/2020 11:29 AM EST -----  Pt's Patient's stress test is normal    Patient can keep regular appointment  Please call patient with the result

## 2020-02-13 ENCOUNTER — OFFICE VISIT (OUTPATIENT)
Dept: FAMILY MEDICINE CLINIC | Facility: CLINIC | Age: 60
End: 2020-02-13
Payer: COMMERCIAL

## 2020-02-13 VITALS
HEIGHT: 67 IN | OXYGEN SATURATION: 96 % | SYSTOLIC BLOOD PRESSURE: 154 MMHG | BODY MASS INDEX: 38.98 KG/M2 | WEIGHT: 248.38 LBS | TEMPERATURE: 97.5 F | DIASTOLIC BLOOD PRESSURE: 86 MMHG | HEART RATE: 67 BPM

## 2020-02-13 DIAGNOSIS — I10 ESSENTIAL HYPERTENSION: ICD-10-CM

## 2020-02-13 DIAGNOSIS — M62.838 MUSCLE SPASM: ICD-10-CM

## 2020-02-13 DIAGNOSIS — Z01.818 PRE-OPERATIVE CLEARANCE: Primary | ICD-10-CM

## 2020-02-13 DIAGNOSIS — T78.40XA ALLERGIC STATE, INITIAL ENCOUNTER: ICD-10-CM

## 2020-02-13 PROCEDURE — 99213 OFFICE O/P EST LOW 20 MIN: CPT | Performed by: FAMILY MEDICINE

## 2020-02-13 RX ORDER — FLUTICASONE PROPIONATE 50 MCG
2 SPRAY, SUSPENSION (ML) NASAL DAILY
Qty: 16 G | Refills: 3 | Status: SHIPPED | OUTPATIENT
Start: 2020-02-13 | End: 2021-04-16 | Stop reason: SDUPTHER

## 2020-02-13 RX ORDER — CYCLOBENZAPRINE HCL 10 MG
10 TABLET ORAL 3 TIMES DAILY PRN
Qty: 90 TABLET | Refills: 1 | Status: SHIPPED | OUTPATIENT
Start: 2020-02-13 | End: 2020-10-05 | Stop reason: SDUPTHER

## 2020-02-13 RX ORDER — AMLODIPINE BESYLATE 5 MG/1
5 TABLET ORAL DAILY
Qty: 30 TABLET | Refills: 3 | Status: SHIPPED | OUTPATIENT
Start: 2020-02-13 | End: 2020-03-20 | Stop reason: SDUPTHER

## 2020-02-13 RX ORDER — LORATADINE 10 MG/1
10 TABLET ORAL DAILY
Qty: 90 TABLET | Refills: 3 | Status: SHIPPED | OUTPATIENT
Start: 2020-02-13 | End: 2021-04-16 | Stop reason: SDUPTHER

## 2020-02-13 NOTE — PROGRESS NOTES
PRE-OPERATIVE EVALUATION  [facility]    NAME: Xochilt Han  AGE: 62 y o  SEX: female  : 1960     DATE: 2020     Pre-Operative Evaluation      Chief Complaint: Pre-operative Evaluation     Surgery: repair of rotator cuff  Anticipated Date of Surgery: 3/12/2020  Referring Provider: Self, Referral       History of Present Illness:     Xochilt Han is a 62 y o  female who presents to the office today for a preoperative consultation at the request of surgeon, Dr Renaldo Saavedra, who plans on performing repair of rotator cuff  on 3/12/2020  Planned anesthesia is regional anesthesia with block  Patient has a bleeding risk of: no remote history of abnormal bleeding  Patient does not have objections to receiving blood products if needed  Current anti-platelet/anti-coagulation medications that the patient is prescribed includes: None  Assessment of Chronic Conditions:   - Hypertension - stable on meds     Assessment of Cardiac Risk:  · Denies unstable or severe angina or MI in the last 6 weeks or history of stent placement in the last year   · Denies decompensated heart failure (e g  New onset heart failure, NYHA functional class IV heart failure, or worsening existing heart failure)  · Denies significant arrhythmias such as high grade AV block, symptomatic ventricular arrhythmia, newly recognized ventricular tachycardia, supraventricular tachycardia with resting heart rate >100, or symptomatic bradycardia  · Denies severe heart valve disease including aortic stenosis or symptomatic mitral stenosis     Exercise Capacity:  · Able to walk 4 blocks without symptoms?: Yes  · Able to walk 2 flights without symptoms?: Yes    Prior Anesthesia Reactions: No     Personal history of venous thromboembolic disease? No    History of steroid use for >2 weeks within last year?  No       Review of Systems:   Review of Systems   Constitutional: Negative for activity change, appetite change, chills, diaphoresis, fatigue, fever and unexpected weight change  HENT: Negative for congestion, dental problem, drooling, ear discharge, ear pain, facial swelling, hearing loss, mouth sores, nosebleeds, postnasal drip, rhinorrhea, sinus pain and sinus pressure  Respiratory: Negative for apnea, cough, choking, chest tightness, shortness of breath, wheezing and stridor  Cardiovascular: Negative for chest pain, palpitations and leg swelling  Gastrointestinal: Negative for abdominal distention, abdominal pain, anal bleeding, blood in stool, constipation, diarrhea, nausea, rectal pain and vomiting  Genitourinary: Negative for decreased urine volume, difficulty urinating, dyspareunia, dysuria, flank pain, hematuria and pelvic pain  Musculoskeletal: Negative for  back pain, gait problem, joint swelling, myalgias, neck pain and neck stiffness  (+) arthralgias  Neurological: Negative for dizziness, tremors, seizures, syncope, facial asymmetry, speech difficulty, weakness, light-headedness, numbness and headaches  Hematological: Negative for adenopathy  Does not bruise/bleed easily  Psychiatric/Behavioral: Negative for agitation, behavioral problems, confusion, decreased concentration, dysphoric mood, hallucinations, sleep disturbance and suicidal ideas  The patient is not nervous/anxious  Current Problem List:     Patient Active Problem List   Diagnosis    Complete tear of left rotator cuff    Essential hypertension    EKG abnormality    Complete tear of right rotator cuff    Rupture long head biceps tendon, right, subsequent encounter    Pre-operative cardiovascular examination    Obesity (BMI 35 0-39 9 without comorbidity)    Exertional shortness of breath    History of bilateral knee replacement     *  Rheumatoid artritis    Allergies:      Allergies   Allergen Reactions    Pollen Extract Other (See Comments)     headaches       Medications:       Current Outpatient Medications:     atenolol (TENORMIN) 100 mg tablet, Take 1 tablet (100 mg total) by mouth daily, Disp: 30 tablet, Rfl: 6    fluticasone (FLONASE) 50 mcg/act nasal spray, 2 sprays into each nostril daily, Disp: 16 g, Rfl: 3    hydrochlorothiazide (HYDRODIURIL) 25 mg tablet, Take 1 tablet (25 mg total) by mouth daily, Disp: 30 tablet, Rfl: 6    hydroxychloroquine (PLAQUENIL) 200 mg tablet, Take 200 mg by mouth 2 (two) times a day with meals, Disp: , Rfl: 0    loratadine (CLARITIN) 10 mg tablet, Take 1 tablet (10 mg total) by mouth daily, Disp: 90 tablet, Rfl: 3    Pseudoephedrine-Ibuprofen (ADVIL COLD & SINUS LIQUI-GELS PO), Take by mouth, Disp: , Rfl:     RESTASIS MULTIDOSE 0 05 % ophthalmic emulsion, Administer to both eyes every 12 (twelve) hours  , Disp: , Rfl:     amLODIPine (NORVASC) 5 mg tablet, Take 1 tablet (5 mg total) by mouth daily, Disp: 30 tablet, Rfl: 3    cyclobenzaprine (FLEXERIL) 10 mg tablet, Take 1 tablet (10 mg total) by mouth 3 (three) times a day as needed for muscle spasms, Disp: 90 tablet, Rfl: 1    Past Medical History:       Past Medical History:   Diagnosis Date    Diverticulitis, colon     Hypertension     Hypothyroidism     h/o " borderline "  issues    Liver cyst     last assessed 4/18/16    Lyme disease     Positive Anaplasma serology     RA (rheumatoid arthritis) (HCC)     Seizure disorder in pregnancy ([facility] [address] )     had 1 seizure x1 1986 during pregnancy- none after    Varicose vein of leg         Past Surgical History:   Procedure Laterality Date    BACK SURGERY      fusion/refusion of 2-8 vertebrae    COLECTOMY      partial sigmoid, last assessed 4/18/16    JOINT REPLACEMENT      LAPAROSCOPY      with adnexectomy    LIVER SURGERY      [place] SHLDR ARTHROSCOP,SURG,W/ROTAT CUFF REPR Left 1/3/2019    Procedure: REPAIR ROTATOR CUFF  ARTHROSCOPIC, BICEPTS TENODESIS, SUBACROMIAL DECOMPRESION;  Surgeon: Kathryn Manzano MD;  Location: [facility];  Service: Orthopedics    REPLACEMENT TOTAL KNEE Bilateral     SINUS SURGERY      TONSILLECTOMY      TYMPANOSTOMY TUBE PLACEMENT          Family History   Problem Relation Age of Onset    Hypertension Mother     Pancreatic cancer Mother 78    Arthritis Father     Hyperthyroidism Father     Hypertension Father     No Known Problems Sister     No Known Problems Brother     No Known Problems Maternal Uncle     No Known Problems Paternal Aunt     No Known Problems Paternal Uncle     No Known Problems Maternal Grandmother     No Known Problems Maternal Grandfather     No Known Problems Paternal Grandmother     No Known Problems Paternal Grandfather     No Known Problems Daughter     No Known Problems Daughter     No Known Problems Daughter     No Known Problems Maternal Aunt     ADD / ADHD Neg Hx     Anesthesia problems Neg Hx     Cancer Neg Hx     Clotting disorder Neg Hx     Collagen disease Neg Hx     Diabetes Neg Hx     Dislocations Neg Hx     Learning disabilities Neg Hx     Neurological problems Neg Hx     Osteoporosis Neg Hx     Rheumatologic disease Neg Hx     Scoliosis Neg Hx     Vascular Disease Neg Hx         Social History     Socioeconomic History    Marital status: /Civil Union     Spouse name: Not on file    Number of children: Not on file    Years of education: Not on file    Highest education level: Not on file   Occupational History    Not on file   Social Needs    Financial resource strain: Not on file    Food insecurity:     Worry: Not on file     Inability: Not on file    Transportation needs:     Medical: Not on file     Non-medical: Not on file   Tobacco Use    Smoking status: Never Smoker    Smokeless tobacco: Never Used   Substance and Sexual Activity    Alcohol use: No    Drug use: No    Sexual activity: Not on file   Lifestyle    Physical activity:     Days per week: Not on file     Minutes per session: Not on file    Stress: Not on file   Relationships    Social connections:     Talks on phone: Not on file     Gets together: Not on file     Attends Christian service: Not on file     Active member of club or organization: Not on file     Attends meetings of clubs or organizations: Not on file     Relationship status: Not on file    Intimate partner violence:     Fear of current or ex partner: Not on file     Emotionally abused: Not on file     Physically abused: Not on file     Forced sexual activity: Not on file   Other Topics Concern    Not on file   Social History Narrative    Always uses seat belt        Physical Exam:     Vitals:    02/13/20 0914   BP: 162/98   Pulse: 67   Temp: 97 5 °F (36 4 °C)   SpO2: 96%       Physical Exam   Constitutional: She is oriented to person, place, and time  She appears well-developed and well-nourished  No distress  HENT:   Head: Normocephalic and atraumatic  Right Ear: External ear normal    Left Ear: External ear normal    Mouth/Throat: Oropharynx is clear and moist  No oropharyngeal exudate  Eyes: Conjunctivae and EOM are normal  Pupils are equal, round, and reactive to light  Right eye exhibits no discharge  Left eye exhibits no discharge  No scleral icterus      Cardiovascular: Normal rate, regular rhythm, normal heart sounds and intact distal pulses  Exam reveals no gallop and no friction rub  No murmur heard  Pulmonary/Chest: Effort normal and breath sounds normal  No respiratory distress  She has no wheezes  She has no rales  She exhibits no tenderness  Abdominal: Soft  Bowel sounds are normal  She exhibits no distension and no mass  There is no tenderness  There is no rebound and no guarding  Musculoskeletal: (+) ieft shoulder tendernessl:   Neurology: She is alert and oriented to person, place, and time  She has normal reflexes  She displays normal reflexes  No cranial nerve deficit  She exhibits normal muscle tone  Coordination normal    Psychiatric: She has a normal mood and affect   Her behavior is normal  Judgment and thought content normal  Nursing note and vitals reviewed  Data:     Pre-operative work-up    Laboratory Results: I have personally reviewed the pertinent laboratory results/reports     EKG: I have personally reviewed pertinent reports  Chest x-ray: not done  Assessment & Recommendations:     1  Pre-operative clearance     2  Allergic state, initial encounter  loratadine (CLARITIN) 10 mg tablet    fluticasone (FLONASE) 50 mcg/act nasal spray   3  Muscle spasm  cyclobenzaprine (FLEXERIL) 10 mg tablet   4  Essential hypertension  amLODIPine (NORVASC) 5 mg tablet       Pre-Op Evaluation Assessment  62 y o  female with planned surgery: rotator cuff repair  Known risk factors for perioperative complications: hyptertension     Cardiac Risk Estimation: per the Revised Cardiac Risk Index (Circ  100:1043, 1999), the patient's risk factors for cardiac complications include hypertension, putting her in: RCI RISK CLASS I (0 risk factors, risk of major cardiac compl  appr  0 5%)  Current medications which may produce withdrawal symptoms if withheld perioperatively: None    Pre-Op Evaluation Plan  1  Further preoperative workup as follows:   - None; no further preoperative work-up is required    2  Medication Management/Recommendations:   - None, continue medication regimen including morning of surgery, with sip of water    3  Prophylaxis for cardiac events with perioperative beta-blockers: not indicated  4  Patient requires further consultation with: Cardiology    Clearance  Patient is CLEARED for surgery without any additional cardiac testing   Will need cardiology clearance     Gali Zhang, [address]  [place] [address]  Phone#  [phone]  Fax#  [phone]

## 2020-02-13 NOTE — H&P (VIEW-ONLY)
PRE-OPERATIVE EVALUATION  [facility]    NAME: Carlos Mejia  AGE: 62 y o  SEX: female  : 1960     DATE: 2020     Pre-Operative Evaluation      Chief Complaint: Pre-operative Evaluation     Surgery: repair of rotator cuff  Anticipated Date of Surgery: 3/12/2020  Referring Provider: Self, Referral       History of Present Illness:     Carlos Mejia is a 62 y o  female who presents to the office today for a preoperative consultation at the request of surgeon, Dr Jarett Mendez, who plans on performing repair of rotator cuff  on 3/12/2020  Planned anesthesia is regional anesthesia with block  Patient has a bleeding risk of: no remote history of abnormal bleeding  Patient does not have objections to receiving blood products if needed  Current anti-platelet/anti-coagulation medications that the patient is prescribed includes: None  Assessment of Chronic Conditions:   - Hypertension - stable on meds     Assessment of Cardiac Risk:  · Denies unstable or severe angina or MI in the last 6 weeks or history of stent placement in the last year   · Denies decompensated heart failure (e g  New onset heart failure, NYHA functional class IV heart failure, or worsening existing heart failure)  · Denies significant arrhythmias such as high grade AV block, symptomatic ventricular arrhythmia, newly recognized ventricular tachycardia, supraventricular tachycardia with resting heart rate >100, or symptomatic bradycardia  · Denies severe heart valve disease including aortic stenosis or symptomatic mitral stenosis     Exercise Capacity:  · Able to walk 4 blocks without symptoms?: Yes  · Able to walk 2 flights without symptoms?: Yes    Prior Anesthesia Reactions: No     Personal history of venous thromboembolic disease? No    History of steroid use for >2 weeks within last year?  No       Review of Systems:   Review of Systems   Constitutional: Negative for activity change, appetite change, chills, diaphoresis, fatigue, fever and unexpected weight change  HENT: Negative for congestion, dental problem, drooling, ear discharge, ear pain, facial swelling, hearing loss, mouth sores, nosebleeds, postnasal drip, rhinorrhea, sinus pain and sinus pressure  Respiratory: Negative for apnea, cough, choking, chest tightness, shortness of breath, wheezing and stridor  Cardiovascular: Negative for chest pain, palpitations and leg swelling  Gastrointestinal: Negative for abdominal distention, abdominal pain, anal bleeding, blood in stool, constipation, diarrhea, nausea, rectal pain and vomiting  Genitourinary: Negative for decreased urine volume, difficulty urinating, dyspareunia, dysuria, flank pain, hematuria and pelvic pain  Musculoskeletal: Negative for  back pain, gait problem, joint swelling, myalgias, neck pain and neck stiffness  (+) arthralgias  Neurological: Negative for dizziness, tremors, seizures, syncope, facial asymmetry, speech difficulty, weakness, light-headedness, numbness and headaches  Hematological: Negative for adenopathy  Does not bruise/bleed easily  Psychiatric/Behavioral: Negative for agitation, behavioral problems, confusion, decreased concentration, dysphoric mood, hallucinations, sleep disturbance and suicidal ideas  The patient is not nervous/anxious  Current Problem List:     Patient Active Problem List   Diagnosis    Complete tear of left rotator cuff    Essential hypertension    EKG abnormality    Complete tear of right rotator cuff    Rupture long head biceps tendon, right, subsequent encounter    Pre-operative cardiovascular examination    Obesity (BMI 35 0-39 9 without comorbidity)    Exertional shortness of breath    History of bilateral knee replacement     *  Rheumatoid artritis    Allergies:      Allergies   Allergen Reactions    Pollen Extract Other (See Comments)     headaches       Medications:       Current Outpatient Medications:     atenolol (TENORMIN) 100 mg tablet, Take 1 tablet (100 mg total) by mouth daily, Disp: 30 tablet, Rfl: 6    fluticasone (FLONASE) 50 mcg/act nasal spray, 2 sprays into each nostril daily, Disp: 16 g, Rfl: 3    hydrochlorothiazide (HYDRODIURIL) 25 mg tablet, Take 1 tablet (25 mg total) by mouth daily, Disp: 30 tablet, Rfl: 6    hydroxychloroquine (PLAQUENIL) 200 mg tablet, Take 200 mg by mouth 2 (two) times a day with meals, Disp: , Rfl: 0    loratadine (CLARITIN) 10 mg tablet, Take 1 tablet (10 mg total) by mouth daily, Disp: 90 tablet, Rfl: 3    Pseudoephedrine-Ibuprofen (ADVIL COLD & SINUS LIQUI-GELS PO), Take by mouth, Disp: , Rfl:     RESTASIS MULTIDOSE 0 05 % ophthalmic emulsion, Administer to both eyes every 12 (twelve) hours  , Disp: , Rfl:     amLODIPine (NORVASC) 5 mg tablet, Take 1 tablet (5 mg total) by mouth daily, Disp: 30 tablet, Rfl: 3    cyclobenzaprine (FLEXERIL) 10 mg tablet, Take 1 tablet (10 mg total) by mouth 3 (three) times a day as needed for muscle spasms, Disp: 90 tablet, Rfl: 1    Past Medical History:       Past Medical History:   Diagnosis Date    Diverticulitis, colon     Hypertension     Hypothyroidism     h/o " borderline "  issues    Liver cyst     last assessed 4/18/16    Lyme disease     Positive Anaplasma serology     RA (rheumatoid arthritis) (HCC)     Seizure disorder in pregnancy ([facility] [address] )     had 1 seizure x1 1986 during pregnancy- none after    Varicose vein of leg         Past Surgical History:   Procedure Laterality Date    BACK SURGERY      fusion/refusion of 2-8 vertebrae    COLECTOMY      partial sigmoid, last assessed 4/18/16    JOINT REPLACEMENT      LAPAROSCOPY      with adnexectomy    LIVER SURGERY      [place] SHLDR ARTHROSCOP,SURG,W/ROTAT CUFF REPR Left 1/3/2019    Procedure: REPAIR ROTATOR CUFF  ARTHROSCOPIC, BICEPTS TENODESIS, SUBACROMIAL DECOMPRESION;  Surgeon: Brooklyn Oseguera MD;  Location: [facility];  Service: Orthopedics    REPLACEMENT TOTAL KNEE Bilateral     SINUS SURGERY      TONSILLECTOMY      TYMPANOSTOMY TUBE PLACEMENT          Family History   Problem Relation Age of Onset    Hypertension Mother     Pancreatic cancer Mother 78    Arthritis Father     Hyperthyroidism Father     Hypertension Father     No Known Problems Sister     No Known Problems Brother     No Known Problems Maternal Uncle     No Known Problems Paternal Aunt     No Known Problems Paternal Uncle     No Known Problems Maternal Grandmother     No Known Problems Maternal Grandfather     No Known Problems Paternal Grandmother     No Known Problems Paternal Grandfather     No Known Problems Daughter     No Known Problems Daughter     No Known Problems Daughter     No Known Problems Maternal Aunt     ADD / ADHD Neg Hx     Anesthesia problems Neg Hx     Cancer Neg Hx     Clotting disorder Neg Hx     Collagen disease Neg Hx     Diabetes Neg Hx     Dislocations Neg Hx     Learning disabilities Neg Hx     Neurological problems Neg Hx     Osteoporosis Neg Hx     Rheumatologic disease Neg Hx     Scoliosis Neg Hx     Vascular Disease Neg Hx         Social History     Socioeconomic History    Marital status: /Civil Union     Spouse name: Not on file    Number of children: Not on file    Years of education: Not on file    Highest education level: Not on file   Occupational History    Not on file   Social Needs    Financial resource strain: Not on file    Food insecurity:     Worry: Not on file     Inability: Not on file    Transportation needs:     Medical: Not on file     Non-medical: Not on file   Tobacco Use    Smoking status: Never Smoker    Smokeless tobacco: Never Used   Substance and Sexual Activity    Alcohol use: No    Drug use: No    Sexual activity: Not on file   Lifestyle    Physical activity:     Days per week: Not on file     Minutes per session: Not on file    Stress: Not on file   Relationships    Social connections:     Talks on phone: Not on file     Gets together: Not on file     Attends Confucianism service: Not on file     Active member of club or organization: Not on file     Attends meetings of clubs or organizations: Not on file     Relationship status: Not on file    Intimate partner violence:     Fear of current or ex partner: Not on file     Emotionally abused: Not on file     Physically abused: Not on file     Forced sexual activity: Not on file   Other Topics Concern    Not on file   Social History Narrative    Always uses seat belt        Physical Exam:     Vitals:    02/13/20 0914   BP: 162/98   Pulse: 67   Temp: 97 5 °F (36 4 °C)   SpO2: 96%       Physical Exam   Constitutional: She is oriented to person, place, and time  She appears well-developed and well-nourished  No distress  HENT:   Head: Normocephalic and atraumatic  Right Ear: External ear normal    Left Ear: External ear normal    Mouth/Throat: Oropharynx is clear and moist  No oropharyngeal exudate  Eyes: Conjunctivae and EOM are normal  Pupils are equal, round, and reactive to light  Right eye exhibits no discharge  Left eye exhibits no discharge  No scleral icterus      Cardiovascular: Normal rate, regular rhythm, normal heart sounds and intact distal pulses  Exam reveals no gallop and no friction rub  No murmur heard  Pulmonary/Chest: Effort normal and breath sounds normal  No respiratory distress  She has no wheezes  She has no rales  She exhibits no tenderness  Abdominal: Soft  Bowel sounds are normal  She exhibits no distension and no mass  There is no tenderness  There is no rebound and no guarding  Musculoskeletal: (+) ieft shoulder tendernessl:   Neurology: She is alert and oriented to person, place, and time  She has normal reflexes  She displays normal reflexes  No cranial nerve deficit  She exhibits normal muscle tone  Coordination normal    Psychiatric: She has a normal mood and affect   Her behavior is normal  Judgment and thought content normal  Nursing note and vitals reviewed  Data:     Pre-operative work-up    Laboratory Results: I have personally reviewed the pertinent laboratory results/reports     EKG: I have personally reviewed pertinent reports  Chest x-ray: not done  Assessment & Recommendations:     1  Pre-operative clearance     2  Allergic state, initial encounter  loratadine (CLARITIN) 10 mg tablet    fluticasone (FLONASE) 50 mcg/act nasal spray   3  Muscle spasm  cyclobenzaprine (FLEXERIL) 10 mg tablet   4  Essential hypertension  amLODIPine (NORVASC) 5 mg tablet       Pre-Op Evaluation Assessment  62 y o  female with planned surgery: rotator cuff repair  Known risk factors for perioperative complications: hyptertension     Cardiac Risk Estimation: per the Revised Cardiac Risk Index (Circ  100:1043, 1999), the patient's risk factors for cardiac complications include hypertension, putting her in: RCI RISK CLASS I (0 risk factors, risk of major cardiac compl  appr  0 5%)  Current medications which may produce withdrawal symptoms if withheld perioperatively: None    Pre-Op Evaluation Plan  1  Further preoperative workup as follows:   - None; no further preoperative work-up is required    2  Medication Management/Recommendations:   - None, continue medication regimen including morning of surgery, with sip of water    3  Prophylaxis for cardiac events with perioperative beta-blockers: not indicated  4  Patient requires further consultation with: Cardiology    Clearance  Patient is CLEARED for surgery without any additional cardiac testing   Will need cardiology clearance     Doreen Yee, [address]  [place] [address]  Phone#  [phone]  Fax#  [phone]

## 2020-02-28 ENCOUNTER — TELEPHONE (OUTPATIENT)
Dept: CARDIOLOGY CLINIC | Facility: CLINIC | Age: 60
End: 2020-02-28

## 2020-02-28 NOTE — TELEPHONE ENCOUNTER
Pre  Op  Clearance note- Cardiology    Triny Dang   62 y o   female  1960      Dr Pamela Mejia :   Patient's chart was reviewed for preop clearance  Patient was seen in our office on 02/04/2020  Patient has past medical history significant for exertional shortness of breath, essential hypertension, obesity, history of knee replacement surgery  Patient is now scheduled for Right Rotator Cuff Repair on 3/12/2020  Patient has no clinical evidence of heart failure or ischemia or active arrhythmia  Patient's last cardiac workup including nuclear stress test done in February 2020 reports were reviewed and it shows normal LV systolic function no ischemia  In my opinion patient is in optimum condition for the procedure as planned  Patient is low risk for the surgery as planned  Continue current cardiac medications  If you have any question please do not hesitate to call us at our office of [facility]  Phone # [phone]  Lab Results   Component Value Date    WBC 3 1 (L) 01/31/2020    HGB 13 0 01/31/2020    HCT 38 3 01/31/2020    MCV 93 01/31/2020     01/31/2020     Lab Results   Component Value Date    CREATININE 0 79 01/31/2020     Lab Results   Component Value Date    GLUF 93 12/07/2018       DR Carey Padilla   2/28/2020  3:42 PM

## 2020-03-09 ENCOUNTER — CLINICAL SUPPORT (OUTPATIENT)
Dept: FAMILY MEDICINE CLINIC | Facility: CLINIC | Age: 60
End: 2020-03-09

## 2020-03-09 VITALS — DIASTOLIC BLOOD PRESSURE: 90 MMHG | SYSTOLIC BLOOD PRESSURE: 136 MMHG

## 2020-03-09 DIAGNOSIS — I10 ESSENTIAL HYPERTENSION: Primary | ICD-10-CM

## 2020-03-09 NOTE — PRE-PROCEDURE INSTRUCTIONS
My Surgical Experience    The following information was developed to assist you to prepare for your operation  What do I need to do before coming to the hospital?   Arrange for a responsible person to drive you to and from the hospital    Arrange care for your children at home  Children are not allowed in the recovery areas of the hospital   Plan to wear clothing that is easy to put on and take off  If you are having shoulder surgery, wear a shirt that buttons or zippers in the front  Bathing  o Shower the evening before and the morning of your surgery with an antibacterial soap  Please refer to the Pre Op Showering Instructions for Surgery Patients Sheet   o Remove nail polish and all body piercing jewelry  o Do not shave any body part for at least 24 hours before surgery-this includes face, arms, legs and upper body  Food  o Nothing to eat or drink after midnight the night before your surgery  This includes candy and chewing gum  o Exception: If your surgery is after 12:00pm (noon), you may have clear liquids such as 7-Up®, ginger ale, apple or cranberry juice, Jell-O®, water, or clear broth until 8:00 am  o Do not drink milk or juice with pulp on the morning before surgery  o Do not drink alcohol 24 hours before surgery  Medicine  o Follow instructions you received from your surgeon about which medicines you may take on the day of surgery  o If instructed to take medicine on the morning of surgery, take pills with just a small sip of water  Call your prescribing doctor for specific infroamtion on what to do if you take insulin    What should I bring to the hospital?    Bring:  Gurpreet Steinberg or a walker, if you have them, for foot or knee surgery   A list of the daily medicines, vitamins, minerals, herbals and nutritional supplements you take   Include the dosages of medicines and the time you take them each day   Glasses, dentures or hearing aids   Minimal clothing; you will be wearing hospital sleepwear   Photo ID; required to verify your identity   If you have a Living Will or Power of , bring a copy of the documents   If you have an ostomy, bring an extra pouch and any supplies you use    Do not bring   Medicines or inhalers   Money, valuables or jewelry    What other information should I know about the day of surgery?  Notify your surgeons if you develop a cold, sore throat, cough, fever, rash or any other illness   Report to the Ambulatory Surgical/Same Day Surgery Unit   You will be instructed to stop at Registration only if you have not been pre-registered   Inform your  fi they do not stay that they will be asked by the staff to leave a phone number where they can be reached   Be available to be reached before surgery  In the event the operating room schedule changes, you may be asked to come in earlier or later than expected    *It is important to tell your doctor and others involved in your health care if you are taking or have been taking any non-prescription drugs, vitamins, minerals, herbals or other nutritional supplements  Any of these may interact with some food or medicines and cause a reaction      Pre-Surgery Instructions:   Medication Instructions    amLODIPine (NORVASC) 5 mg tablet Instructed patient per Anesthesia Guidelines   atenolol (TENORMIN) 100 mg tablet Instructed patient per Anesthesia Guidelines   cyclobenzaprine (FLEXERIL) 10 mg tablet Instructed patient per Anesthesia Guidelines   fluticasone (FLONASE) 50 mcg/act nasal spray Instructed patient per Anesthesia Guidelines   hydrochlorothiazide (HYDRODIURIL) 25 mg tablet Instructed patient per Anesthesia Guidelines   hydroxychloroquine (PLAQUENIL) 200 mg tablet Instructed patient per Anesthesia Guidelines   loratadine (CLARITIN) 10 mg tablet Instructed patient per Anesthesia Guidelines      Pseudoephedrine-Ibuprofen (ADVIL COLD & SINUS LIQUI-GELS PO) Instructed patient per Anesthesia Guidelines   RESTASIS MULTIDOSE 0 05 % ophthalmic emulsion Instructed patient per Anesthesia Guidelines  To take atenolol and plaquenil a m  Of surgery

## 2020-03-11 ENCOUNTER — ANESTHESIA EVENT (OUTPATIENT)
Dept: PERIOP | Facility: HOSPITAL | Age: 60
End: 2020-03-11
Payer: COMMERCIAL

## 2020-03-11 NOTE — ANESTHESIA PREPROCEDURE EVALUATION
Review of Systems/Medical History  Patient summary reviewed  Chart reviewed  No history of anesthetic complications     Cardiovascular  Hypertension ,    Pulmonary       GI/Hepatic    GERD well controlled, Liver disease (liver cyst) ,             Endo/Other  History of thyroid disease , hypothyroidism,   Comment: H/o Lyme disease Obesity    GYN       Hematology      Comment: [employer] - no whole blood, albumin okay, no plasma or platelets Musculoskeletal  Rheumatoid arthritis ,   Arthritis     Neurology  Seizures (1 seizure while pregnant) ,     Psychology           Physical Exam    Airway    Mallampati score: II  TM Distance: >3 FB  Neck ROM: full     Dental       Cardiovascular  Rhythm: regular, Rate: normal,     Pulmonary  Breath sounds clear to auscultation,     Other Findings        Anesthesia Plan  ASA Score- 2     Anesthesia Type- general and regional with ASA Monitors  Additional Monitors:   Airway Plan:     Comment: Right interscalene block  Plan Factors-    Induction- intravenous  Postoperative Plan- Plan for postoperative opioid use  Planned trial extubation    Informed Consent- Anesthetic plan and risks discussed with patient  I personally reviewed this patient with the CRNA  Discussed and agreed on the Anesthesia Plan with the CRNA  Alex Eason

## 2020-03-12 ENCOUNTER — ANESTHESIA (OUTPATIENT)
Dept: PERIOP | Facility: HOSPITAL | Age: 60
End: 2020-03-12
Payer: COMMERCIAL

## 2020-03-12 ENCOUNTER — HOSPITAL ENCOUNTER (OUTPATIENT)
Facility: HOSPITAL | Age: 60
Setting detail: OUTPATIENT SURGERY
Discharge: HOME | End: 2020-03-12
Attending: ORTHOPAEDIC SURGERY | Admitting: ORTHOPAEDIC SURGERY
Payer: COMMERCIAL

## 2020-03-12 VITALS
RESPIRATION RATE: 18 BRPM | HEART RATE: 66 BPM | SYSTOLIC BLOOD PRESSURE: 132 MMHG | WEIGHT: 246.2 LBS | BODY MASS INDEX: 38.64 KG/M2 | HEIGHT: 67 IN | DIASTOLIC BLOOD PRESSURE: 67 MMHG | OXYGEN SATURATION: 96 % | TEMPERATURE: 97.8 F

## 2020-03-12 DIAGNOSIS — M75.121 COMPLETE TEAR OF RIGHT ROTATOR CUFF, UNSPECIFIED WHETHER TRAUMATIC: Primary | ICD-10-CM

## 2020-03-12 PROCEDURE — 99024 POSTOP FOLLOW-UP VISIT: CPT | Performed by: ORTHOPAEDIC SURGERY

## 2020-03-12 PROCEDURE — C1713 ANCHOR/SCREW BN/BN,TIS/BN: HCPCS | Performed by: ORTHOPAEDIC SURGERY

## 2020-03-12 PROCEDURE — 29827 SHO ARTHRS SRG RT8TR CUF RPR: CPT | Performed by: PHYSICIAN ASSISTANT

## 2020-03-12 PROCEDURE — 29828 SHO ARTHRS SRG BICP TENODSIS: CPT | Performed by: ORTHOPAEDIC SURGERY

## 2020-03-12 PROCEDURE — 29828 SHO ARTHRS SRG BICP TENODSIS: CPT | Performed by: PHYSICIAN ASSISTANT

## 2020-03-12 PROCEDURE — 29826 SHO ARTHRS SRG DECOMPRESSION: CPT | Performed by: PHYSICIAN ASSISTANT

## 2020-03-12 PROCEDURE — 29826 SHO ARTHRS SRG DECOMPRESSION: CPT | Performed by: ORTHOPAEDIC SURGERY

## 2020-03-12 PROCEDURE — C9290 INJ, BUPIVACAINE LIPOSOME: HCPCS | Performed by: ANESTHESIOLOGY

## 2020-03-12 PROCEDURE — 29827 SHO ARTHRS SRG RT8TR CUF RPR: CPT | Performed by: ORTHOPAEDIC SURGERY

## 2020-03-12 DEVICE — BIO-COMP SWVLK C, CLD 4.75X19.1MM
Type: IMPLANTABLE DEVICE | Site: SHOULDER | Status: FUNCTIONAL
Brand: ARTHREX®

## 2020-03-12 DEVICE — SPEEDBRG IMP SYS W/BIO-COMP SWVLK
Type: IMPLANTABLE DEVICE | Site: SHOULDER | Status: FUNCTIONAL
Brand: ARTHREX®

## 2020-03-12 RX ORDER — CEFAZOLIN SODIUM 2 G/50ML
SOLUTION INTRAVENOUS AS NEEDED
Status: DISCONTINUED | OUTPATIENT
Start: 2020-03-12 | End: 2020-03-12 | Stop reason: SURG

## 2020-03-12 RX ORDER — KETOROLAC TROMETHAMINE 30 MG/ML
30 INJECTION, SOLUTION INTRAMUSCULAR; INTRAVENOUS ONCE
Status: COMPLETED | OUTPATIENT
Start: 2020-03-12 | End: 2020-03-12

## 2020-03-12 RX ORDER — HYDROMORPHONE HCL 110MG/55ML
PATIENT CONTROLLED ANALGESIA SYRINGE INTRAVENOUS AS NEEDED
Status: DISCONTINUED | OUTPATIENT
Start: 2020-03-12 | End: 2020-03-12 | Stop reason: SURG

## 2020-03-12 RX ORDER — EPHEDRINE SULFATE 50 MG/ML
INJECTION INTRAVENOUS AS NEEDED
Status: DISCONTINUED | OUTPATIENT
Start: 2020-03-12 | End: 2020-03-12 | Stop reason: SURG

## 2020-03-12 RX ORDER — FENTANYL CITRATE 50 UG/ML
INJECTION, SOLUTION INTRAMUSCULAR; INTRAVENOUS AS NEEDED
Status: DISCONTINUED | OUTPATIENT
Start: 2020-03-12 | End: 2020-03-12 | Stop reason: SURG

## 2020-03-12 RX ORDER — SODIUM CHLORIDE, SODIUM LACTATE, POTASSIUM CHLORIDE, CALCIUM CHLORIDE 600; 310; 30; 20 MG/100ML; MG/100ML; MG/100ML; MG/100ML
75 INJECTION, SOLUTION INTRAVENOUS CONTINUOUS
Status: DISCONTINUED | OUTPATIENT
Start: 2020-03-12 | End: 2020-03-12 | Stop reason: HOSPADM

## 2020-03-12 RX ORDER — DEXAMETHASONE SODIUM PHOSPHATE 10 MG/ML
INJECTION, SOLUTION INTRAMUSCULAR; INTRAVENOUS AS NEEDED
Status: DISCONTINUED | OUTPATIENT
Start: 2020-03-12 | End: 2020-03-12 | Stop reason: SURG

## 2020-03-12 RX ORDER — MAGNESIUM HYDROXIDE 1200 MG/15ML
LIQUID ORAL AS NEEDED
Status: DISCONTINUED | OUTPATIENT
Start: 2020-03-12 | End: 2020-03-12 | Stop reason: HOSPADM

## 2020-03-12 RX ORDER — BUPIVACAINE HYDROCHLORIDE 5 MG/ML
INJECTION, SOLUTION PERINEURAL
Status: COMPLETED | OUTPATIENT
Start: 2020-03-12 | End: 2020-03-12

## 2020-03-12 RX ORDER — CEFAZOLIN SODIUM 2 G/50ML
2000 SOLUTION INTRAVENOUS ONCE
Status: DISCONTINUED | OUTPATIENT
Start: 2020-03-12 | End: 2020-03-12 | Stop reason: HOSPADM

## 2020-03-12 RX ORDER — LIDOCAINE HYDROCHLORIDE 10 MG/ML
INJECTION, SOLUTION EPIDURAL; INFILTRATION; INTRACAUDAL; PERINEURAL AS NEEDED
Status: DISCONTINUED | OUTPATIENT
Start: 2020-03-12 | End: 2020-03-12 | Stop reason: SURG

## 2020-03-12 RX ORDER — FENTANYL CITRATE/PF 50 MCG/ML
50 SYRINGE (ML) INJECTION
Status: DISCONTINUED | OUTPATIENT
Start: 2020-03-12 | End: 2020-03-12 | Stop reason: HOSPADM

## 2020-03-12 RX ORDER — MIDAZOLAM HYDROCHLORIDE 2 MG/2ML
INJECTION, SOLUTION INTRAMUSCULAR; INTRAVENOUS AS NEEDED
Status: DISCONTINUED | OUTPATIENT
Start: 2020-03-12 | End: 2020-03-12 | Stop reason: SURG

## 2020-03-12 RX ORDER — MEPERIDINE HYDROCHLORIDE 25 MG/ML
12.5 INJECTION INTRAMUSCULAR; INTRAVENOUS; SUBCUTANEOUS
Status: DISCONTINUED | OUTPATIENT
Start: 2020-03-12 | End: 2020-03-12 | Stop reason: HOSPADM

## 2020-03-12 RX ORDER — TRAMADOL HYDROCHLORIDE 50 MG/1
50 TABLET ORAL EVERY 6 HOURS PRN
COMMUNITY

## 2020-03-12 RX ORDER — ONDANSETRON 2 MG/ML
4 INJECTION INTRAMUSCULAR; INTRAVENOUS ONCE AS NEEDED
Status: DISCONTINUED | OUTPATIENT
Start: 2020-03-12 | End: 2020-03-12 | Stop reason: HOSPADM

## 2020-03-12 RX ORDER — OXYCODONE HYDROCHLORIDE AND ACETAMINOPHEN 5; 325 MG/1; MG/1
1 TABLET ORAL EVERY 4 HOURS PRN
Qty: 15 TABLET | Refills: 0 | Status: SHIPPED | OUTPATIENT
Start: 2020-03-12 | End: 2020-11-06

## 2020-03-12 RX ORDER — GLYCOPYRROLATE 0.2 MG/ML
INJECTION INTRAMUSCULAR; INTRAVENOUS AS NEEDED
Status: DISCONTINUED | OUTPATIENT
Start: 2020-03-12 | End: 2020-03-12 | Stop reason: SURG

## 2020-03-12 RX ORDER — PROPOFOL 10 MG/ML
INJECTION, EMULSION INTRAVENOUS AS NEEDED
Status: DISCONTINUED | OUTPATIENT
Start: 2020-03-12 | End: 2020-03-12 | Stop reason: SURG

## 2020-03-12 RX ORDER — HYDROMORPHONE HCL/PF 1 MG/ML
0.5 SYRINGE (ML) INJECTION
Status: DISCONTINUED | OUTPATIENT
Start: 2020-03-12 | End: 2020-03-12 | Stop reason: HOSPADM

## 2020-03-12 RX ORDER — PROMETHAZINE HYDROCHLORIDE 25 MG/ML
12.5 INJECTION, SOLUTION INTRAMUSCULAR; INTRAVENOUS ONCE AS NEEDED
Status: DISCONTINUED | OUTPATIENT
Start: 2020-03-12 | End: 2020-03-12 | Stop reason: HOSPADM

## 2020-03-12 RX ADMIN — EPHEDRINE SULFATE 10 MG: 50 INJECTION, SOLUTION INTRAVENOUS at 11:39

## 2020-03-12 RX ADMIN — CEFAZOLIN SODIUM 2000 MG: 2 SOLUTION INTRAVENOUS at 11:27

## 2020-03-12 RX ADMIN — KETOROLAC TROMETHAMINE 30 MG: 30 INJECTION, SOLUTION INTRAMUSCULAR at 14:08

## 2020-03-12 RX ADMIN — SODIUM CHLORIDE, SODIUM LACTATE, POTASSIUM CHLORIDE, AND CALCIUM CHLORIDE 75 ML/HR: .6; .31; .03; .02 INJECTION, SOLUTION INTRAVENOUS at 09:11

## 2020-03-12 RX ADMIN — HYDROMORPHONE HYDROCHLORIDE 0.25 MG: 2 INJECTION INTRAMUSCULAR; INTRAVENOUS; SUBCUTANEOUS at 11:53

## 2020-03-12 RX ADMIN — FENTANYL CITRATE 50 MCG: 50 INJECTION, SOLUTION INTRAMUSCULAR; INTRAVENOUS at 11:22

## 2020-03-12 RX ADMIN — DEXAMETHASONE SODIUM PHOSPHATE 4 MG: 10 INJECTION, SOLUTION INTRAMUSCULAR; INTRAVENOUS at 12:04

## 2020-03-12 RX ADMIN — EPHEDRINE SULFATE 10 MG: 50 INJECTION, SOLUTION INTRAVENOUS at 11:33

## 2020-03-12 RX ADMIN — PROPOFOL 200 MG: 10 INJECTION, EMULSION INTRAVENOUS at 11:22

## 2020-03-12 RX ADMIN — SODIUM CHLORIDE, SODIUM LACTATE, POTASSIUM CHLORIDE, AND CALCIUM CHLORIDE: .6; .31; .03; .02 INJECTION, SOLUTION INTRAVENOUS at 11:15

## 2020-03-12 RX ADMIN — GLYCOPYRROLATE 0.2 MG: 0.2 INJECTION, SOLUTION INTRAMUSCULAR; INTRAVENOUS at 11:44

## 2020-03-12 RX ADMIN — SODIUM CHLORIDE, SODIUM LACTATE, POTASSIUM CHLORIDE, AND CALCIUM CHLORIDE: .6; .31; .03; .02 INJECTION, SOLUTION INTRAVENOUS at 12:31

## 2020-03-12 RX ADMIN — MIDAZOLAM HYDROCHLORIDE 2 MG: 1 INJECTION, SOLUTION INTRAMUSCULAR; INTRAVENOUS at 10:09

## 2020-03-12 RX ADMIN — EPHEDRINE SULFATE 10 MG: 50 INJECTION, SOLUTION INTRAVENOUS at 11:45

## 2020-03-12 RX ADMIN — HYDROMORPHONE HYDROCHLORIDE 0.25 MG: 2 INJECTION INTRAMUSCULAR; INTRAVENOUS; SUBCUTANEOUS at 12:54

## 2020-03-12 RX ADMIN — BUPIVACAINE HYDROCHLORIDE 5 ML: 5 INJECTION, SOLUTION PERINEURAL at 10:13

## 2020-03-12 RX ADMIN — PHENYLEPHRINE HYDROCHLORIDE 20 MCG/MIN: 10 INJECTION INTRAVENOUS at 11:35

## 2020-03-12 RX ADMIN — LIDOCAINE HYDROCHLORIDE 50 MG: 10 INJECTION, SOLUTION EPIDURAL; INFILTRATION; INTRACAUDAL; PERINEURAL at 11:22

## 2020-03-12 RX ADMIN — FENTANYL CITRATE 50 MCG: 50 INJECTION, SOLUTION INTRAMUSCULAR; INTRAVENOUS at 10:09

## 2020-03-12 NOTE — ANESTHESIA PROCEDURE NOTES
Peripheral Block    Reason for block: at surgeon's request and post-op pain management  Staffing  Anesthesiologist: Stephanie Alonso MD  Resident/CRNA: Aaliyah Haley CRNA  Preanesthetic Checklist  Completed: patient identified, site marked, surgical consent, pre-op evaluation, timeout performed, IV checked, risks and benefits discussed and monitors and equipment checked  Peripheral Block  Patient position: supine  Prep: ChloraPrep  Patient monitoring: heart rate, cardiac monitor, continuous pulse ox and frequent blood pressure checks  Block type: interscalene  Laterality: right  Injection technique: single-shot  Procedures: ultrasound guided, Ultrasound guidance required for the procedure to increase accuracy and safety of medication placement and decrease risk of complications  Ultrasound permanent image savedbupivacaine (MARCAINE) 0 5 % perineural infiltration, 5 mL (with 20 ml of exparel)  Needle  Needle type: Stimuplex   Needle gauge: 22 G  Needle length: 10 cm  Needle localization: ultrasound guidance  Assessment  Injection assessment: local visualized surrounding nerve on ultrasound, negative aspiration for heme, negative aspiration for CSF and no paresthesia on injection  Paresthesia pain: none  Heart rate change: no  Slow fractionated injection: yes  Post-procedure:  site cleaned  patient tolerated the procedure well with no immediate complications  Additional Notes  Jerrica Marion present for time out and procedure

## 2020-03-12 NOTE — ANESTHESIA POSTPROCEDURE EVALUATION
Post-Op Assessment Note    CV Status:  Stable  Pain Score: 0    Pain management: adequate     Mental Status:  Alert and awake   Hydration Status:  Euvolemic   PONV Controlled:  Controlled   Airway Patency:  Patent   Post Op Vitals Reviewed: Yes      Staff: CRNA           BP   125/92   Temp   97 3   Pulse  63   Resp   16   SpO2   93% on RA

## 2020-03-12 NOTE — OP NOTE
OPERATIVE REPORT  PATIENT NAME: Patricia Rosas    :  1960  MRN: [id number]  Pt Location: [facility] OR ROOM 03    SURGERY DATE: 3/12/2020    Surgeon(s) and Role:     * Elesa Homans, MD - Primary     * Danny Campos PA-C - Assisting necessary for the procedure for assistance with [facility] [place] operative weeks for rotator cuff repair and biceps tenodesis and subacromial decompression for utilization of the camera as well as assistance with suture management and placement of the anchors and utilization of the shaver and bur  Gelacio serve in ATC and OTC 2nd assistant    Preop Diagnosis:  Complete tear of right rotator cuff, unspecified whether traumatic [M75 121]  Rupture long head biceps tendon, right, subsequent encounter [S46 111D]    Post-Op Diagnosis Codes:     * Complete tear of right rotator cuff, unspecified whether traumatic [M75 121] of both supraspinatus and subscapularis     * Rupture long head biceps tendon, right, subsequent encounter [S46 111D] hand subacromial impingement    Procedure:  Right shoulder arthroscopy with rotator cuff repair of supraspinatus tendon utilizing speed bridge technique from [employer] with 4 anchors and 12 suture limbs as well as repair of subscapularis tendon with 1 SwiveLock anchor and 1 FiberTape as well as biceps tenodesis utilizing the FiberWire suture from the the subscapularis repair anchor and the FiberWire suture from the tendon preparation and subacromial decompression    Specimen(s):  * No specimens in log *    Estimated Blood Loss:   Minimal    Drains:  * No LDAs found *    Anesthesia Type:   Regional with Sedation    Operative Indications:  Complete tear of right rotator cuff, unspecified whether traumatic [M75 121]  Rupture long head biceps tendon, right, subsequent encounter Lexi Robison is a 53-year-old female who is suffering with right shoulder pain and weakness    MRI demonstrated supraspinatus that does reveal Aricept in tear as well as long head biceps tendon tear  She had failed conservative measures and wished to undergo right shoulder arthroscopy with rotator cuff repair and subacromial decompression with possible biceps tenodesis  She does have the risks and benefits the procedure wish to go ahead  The risks are inclusive of but not limited to infection, stiffness, nerve injury causing numbness pain weakness, worsening of symptoms, failure to regain full strength and ability, failure to achieve anticipated results, and need for further surgery  Operative Findings:  Right shoulder exam under anesthesia demonstrated forward flexion and abduction each to 160° of external rotation to 70° internal rotation 60°  Intra-articular findings demonstrated some glenohumeral cartilage articular wear which was grade 3 in a region adjacent to the long head of biceps tendon tearing and subscapularis tearing  The glenoid appeared to be intact  No loose bodies in the axillary pouch great the supraspinatus tendon obvious massive tear with significant retraction and also the subscapularis tendon had a significant tear with retraction  Long head biceps tendon also had high-grade partial-thickness tearing  The labrum had degenerative tearing  We did repair the subscapularis tendon as well as tenodesis of long head of the biceps tendon and then demonstrated also a type 3 subacromial spur upon likely requirement acromioplasty with use of a bur for subacromial decompression  We did utilize a double row repair for the supraspinatus massive rotator cuff tear repair  Complications:   None    Procedure and Technique:  Triny was taken to the operating room and placed upon the OR table    She was given preoperative IV antibiotics as well as preoperative regional anesthesia by the attending anesthesiologist   General anesthesia was induced and she was brought clinically stable into the beach chair position with all parts well padded and the head neutral in the head anthony period the right shoulder was taken through exam under anesthesia as described above  The right upper extremity was then prepped and draped in usual sterile fashion  A surgical time-out was taken  We created the posterior portal with an 11 blade  Diagnostic arthroscopy was begun and anterior portals made in the rotator interval with 11 blade  We demonstrated significant tearing of the supraspinatus and subscapularis and long head biceps tendons  There is significant retraction of the supraspinatus and subscapularis  There was significant degenerative tearing of the superior labrum  There was some arthritic change found along the humeral head as described above  No loose bodies  We then created a lateral portal the left blade  We did utilize at that lateral portal to tag the long head of biceps tendon with a FiberWire suture  We then utilized the Wand to perform a tenotomy on the biceps tendon long head  We then debrided the area of the superior aspect of the lesser tuberosity in the area of bicipital groove with a bird other bleeding bed of bone for healing enhancement  We then placed the FiberTape suture in a horizontal mattress configuration into the subscapularis which was significantly retracted  We then placed the SwiveLock anchor into the superior aspect the lesser tuberosity  This brought the tendon over very nicely  We then took the FiberWire sutures were then anchor and did place a hard allow mattress states into the long head of the biceps tendon and tied that down without psychotic knot-tying techniques  We then tied the hanging states she of the biceps tendon to the anchor stitch in standard fashion to add additional strength of the repair  We removed excess suture  We then with the subacromial space revealed demonstrated type 3 subacromial spur  We performed an acromioplasty on the spur with the use of a bur for a subacromial decompression    We then began repair of the supraspinatus tendon as we had then utilized a bur to debride the greater tuberosity down to bleeding bed of bone  We then began our repair placing a SwiveLock anchor along the anterior medial aspect adjacent to the articular margin and passed the FiberTape and FiberWire sutures in horizontal mattress configuration  We then placed a 2nd anchor along the posterior medial margin and passed the FiberTape and FiberWire sutures in her shoulder mattress configuration  We took 1 FiberTape suture from each medial anchor and placed omitted anterior lateral anchor  This brought the supraspinatus tendon back down onto bleeding bed of bone very nicely  We passed the FiberWire suture from that anchor into the anterior aspect of the supraspinatus tendon tear and tied that down without arthroscopic knot-tying techniques  We took the remaining 2 far with a features from the medial row and place them into a posterior lateral anchor  We passed the FiberWire suture the anchor in a simple fashion into the upper border of the infraspinatus tendon which had a tear as well  We tied that down with arthroscopic knot-tying techniques  We then tied the remaining FiberWire suture from the medial row with arthroscopic knot-tying techniques  We had an excellent fixation of the supraspinatus and infraspinatus as well as subscapularis and long head of the biceps tendons and then removed the scope equipment   We closed the portals with 4-0 nylon sutures  Dry, sterile dressings were applied with a sling  She tolerated well and transferred recovery room stable condition  She will follow up in 1 week for suture removal   She will be on the combined supraspinatus and subscapularis and long head biceps tendon rehabilitation protocols     I was present for the entire procedure and A qualified resident physician was not available    Patient Disposition:  PACU     SIGNATURE: Julio Franco MD  DATE: March 12, 2020  TIME: 12:54 PM

## 2020-03-12 NOTE — PERIOPERATIVE NURSING NOTE
1442 - Received pt awake and alert from PACU  Pain is 4/10 after receiving Toradol in PACU  Fingers on right hand mobile, warm to touch  Good capillary refill  Fluid intake good

## 2020-03-12 NOTE — INTERVAL H&P NOTE
H&P reviewed  After examining the patient I find no changes in the patients condition since the H&P had been written      Vitals:    03/12/20 0827   BP: 142/79   Pulse: 63   Resp: 18   Temp: 98 2 °F (36 8 °C)   SpO2: 98%

## 2020-03-12 NOTE — DISCHARGE INSTRUCTIONS
Instruction Sheet Following RTC repair     Sling:   Wear your sling at all times after your surgery (this includes sleeping), except for when you are doing pendulum exercises, showering, or physical therapy  Additionally, you should not carry anything heavier than a pencil in your hand  Dressing:   Remove all cotton and yellow gauze 48 hours after your surgery  You do not need to put a new dressing on your wound; place Band-Aids on your wound  Showering: You may shower 48 hours after surgery  Please use CAUTION!! Be careful not to slip and fall  The effects of anesthesia and/or medication may make you drowsy or light-headed  Do not soak in a bathtub, hot tub, or pool until the doctor tells you it is O K , to do so  Once you are done showering pat the wound dry and apply a Band-Aid  While in the shower you must keep the arm across the front of the body as if it were still in the sling  Sleeping:   You will most likely have difficulty sleeping in the first few weeks after surgery  Most people find it more comfortable to sleep in a reclining position  You can either sleep in a recliner chair or create this position with pillows  Ice:   You can ice the shoulder to reduce swelling and discomfort  Do not ice the shoulder more than 20 minutes at a time  Let the shoulder warm up before reapplication  Avoid getting you wound wet  If you have a Cryocuff you may keep this on continuously  Follow-up visit:   You need to see the doctor about one week following surgery for your first post-op visit  At that time your sutures (stitches) will be removed  You will be given a prescription to begin physical therapy if you were not already given one  Common concerns:   Bruising and/or swelling of the shoulder, arm, or hand are common after surgery  To relieve this discomfort it is best to ice the shoulder  Please call if:   1  Any oozing or redness of the wound, fevers (>101 3°F), or chills       2  Any difficulty breathing or heaviness in the chest      REMEMBER - these are only guidelines for what to expect  If you have any questions or concerns, please do not hesitate to call the office  [phone]

## 2020-03-12 NOTE — PERIOPERATIVE NURSING NOTE
Awake and alert  Ambulated to BR to void without difficulty  Right shoulder dressing D+I  Right arm sling on at all times  Fingers on right hand warm and mobile with good capillary refill  Patient and family verbalize understanding of discharge instructions  Tolerating oral fluids well

## 2020-03-12 NOTE — H&P
H&P Exam - [address] [facility] 62 y o  female MRN: [id number]  Unit/Bed#: OR POOL Encounter: [id number]    Assessment/Plan     Assessment:  Right shoulder rotator cuff tear   Plan:  Right shoulder arthroscopy with rotator cuff repair and subacromial decompression    History of Present Illness   HPI:  Sulma Harley is a 62 y o  female who presents with right shoulder pain and weakness and impingement and MRI with Rotator cuff tear      Review of Systems    Historical Information   Past Medical History:   Diagnosis Date    Diverticulitis, colon     Hypertension     Hypothyroidism     h/o " borderline "  issues    Liver cyst     last assessed 4/18/16    Lyme disease     Positive Anaplasma serology     RA [facility]heumatoid [facility]) [facility] [address] [facility]     [facility]     had 1 seizure x1 1986 during pregnancy- none after    Varicose vein of leg      Past Surgical History:   Procedure Laterality Date    BACK SURGERY      fusion/refusion of 2-8 vertebrae    COLECTOMY      partial sigmoid, last assessed 4/18/16    JOINT REPLACEMENT      LAPAROSCOPY      with adnexectomy    LIVER SURGERY      HI SHLDR ARTHROSCOP,SURG,W/ROTAT CUFF REPR Left 1/3/2019    Procedure: REPAIR ROTATOR CUFF  ARTHROSCOPIC, BICEPTS TENODESIS, SUBACROMIAL DECOMPRESION;  Surgeon: Kathryn Manzano MD;  Location[facility];  Service: Orthopedics    REPLACEMENT TOTAL KNEE Bilateral     SINUS SURGERY      TONSILLECTOMY      TYMPANOSTOMY TUBE PLACEMENT       Social History   Social History     Substance and Sexual Activity   Alcohol Use No     Social History     Substance and Sexual Activity   Drug Use No     Social History     Tobacco Use   Smoking Status Never Smoker   Smokeless Tobacco Never Used     Family History:   Family History   Problem Relation Age of Onset    Hypertension Mother     Pancreatic cancer Mother 78    Arthritis Father     Hyperthyroidism Father     Hypertension Father     No Known Problems Sister     No Known Problems Brother     No Known Problems Maternal Uncle     No Known Problems Paternal Aunt     No Known Problems Paternal Uncle     No Known Problems Maternal Grandmother     No Known Problems Maternal Grandfather     No Known Problems Paternal Grandmother     No Known Problems Paternal Grandfather     No Known Problems Daughter     No Known Problems Daughter     No Known Problems Daughter     No Known Problems Maternal Aunt     ADD / ADHD Neg Hx     Anesthesia problems Neg Hx     Cancer Neg Hx     Clotting disorder Neg Hx     Collagen disease Neg Hx     Diabetes Neg Hx     Dislocations Neg Hx     Learning disabilities Neg Hx     Neurological problems Neg Hx     Osteoporosis Neg Hx     Rheumatologic disease Neg Hx     Scoliosis Neg Hx     Vascular Disease Neg Hx        Meds/Allergies   PTA meds:   Prior to Admission Medications   Prescriptions Last Dose Informant Patient Reported? Taking?    Pseudoephedrine-Ibuprofen (ADVIL COLD & SINUS LIQUI-GELS PO) Unknown at Unknown time Self Yes No   Sig: Take by mouth   RESTASIS MULTIDOSE 0 05 % ophthalmic emulsion Past Week at Unknown time Self Yes Yes   Sig: Administer to both eyes every 12 (twelve) hours     amLODIPine (NORVASC) 5 mg tablet 3/11/2020 at 2330  No Yes   Sig: Take 1 tablet (5 mg total) by mouth daily   Patient taking differently: Take 5 mg by mouth daily at bedtime    atenolol (TENORMIN) 100 mg tablet 3/12/2020 at 0600 Self No Yes   Sig: Take 1 tablet (100 mg total) by mouth daily   cyclobenzaprine (FLEXERIL) 10 mg tablet Past Week at Unknown time  No Yes   Sig: Take 1 tablet (10 mg total) by mouth 3 (three) times a day as needed for muscle spasms   fluticasone (FLONASE) 50 mcg/act nasal spray Past Month at Unknown time  No Yes   Si sprays into each nostril daily   hydrochlorothiazide (HYDRODIURIL) 25 mg tablet 3/11/2020 at 0800 Self No Yes   Sig: Take 1 tablet (25 mg total) by mouth daily   hydroxychloroquine (PLAQUENIL) 200 mg tablet 3/11/2020 at 1700 Self Yes Yes   Sig: Take 200 mg by mouth 2 (two) times a day with meals   loratadine (CLARITIN) 10 mg tablet 3/11/2020 at 2300  No Yes   Sig: Take 1 tablet (10 mg total) by mouth daily   traMADol (ULTRAM) 50 mg tablet 3/11/2020 at 1700  Yes Yes   Sig: Take 50 mg by mouth every 6 (six) hours as needed for moderate pain      Facility-Administered Medications: None     Allergies   Allergen Reactions    Pollen Extract Other (See Comments)     headaches       Objective   Vitals: Blood pressure 142/79, pulse 63, temperature 98 2 °F (36 8 °C), temperature source Temporal, resp  rate 18, height 5' 7" (1 702 m), weight 112 kg (246 lb 3 2 oz), SpO2 98 %  ,Body mass index is 38 56 kg/m²  No intake or output data in the 24 hours ending 03/12/20 1111    No intake/output data recorded  Invasive Devices     Peripheral Intravenous Line            Peripheral IV 03/12/20 Left Hand less than 1 day                Physical Exam   Constitutional: She is oriented to person, place, and time  She appears well-developed and well-nourished  HENT:   Head: Normocephalic and atraumatic  Right Ear: External ear normal    Left Ear: External ear normal    Eyes: Conjunctivae and EOM are normal    Neck: Normal range of motion  Neck supple  Cardiovascular: Normal rate and intact distal pulses  Pulmonary/Chest: Effort normal  No respiratory distress  Neurological: She is alert and oriented to person, place, and time  Skin: Skin is warm  Capillary refill takes less than 2 seconds  Psychiatric: She has a normal mood and affect  Her behavior is normal    Vitals reviewed      Right Shoulder Exam     Range of Motion   Active abduction: abnormal   External rotation: abnormal   Forward flexion: abnormal     Other   Pulse: present    Comments:  Weak abduction            Lab Results: CBC: No results found for: WBC, HGB, HCT, MCV, PLT, ADJUSTEDWBC, MCH, MCHC, RDW, MPV, NRBC  Imaging: I have personally reviewed pertinent films in PACS  EKG, Pathology, and Other Studies: I have personally reviewed pertinent films in PACS    Code Status: No Order  Advance Directive and Living Will:      Power of :    POLST:

## 2020-03-20 ENCOUNTER — OFFICE VISIT (OUTPATIENT)
Dept: OBGYN CLINIC | Facility: CLINIC | Age: 60
End: 2020-03-20

## 2020-03-20 VITALS — HEIGHT: 67 IN | WEIGHT: 246 LBS | BODY MASS INDEX: 38.61 KG/M2

## 2020-03-20 DIAGNOSIS — Z98.890 S/P RIGHT ROTATOR CUFF REPAIR: Primary | ICD-10-CM

## 2020-03-20 DIAGNOSIS — I10 ESSENTIAL HYPERTENSION: ICD-10-CM

## 2020-03-20 PROCEDURE — 3008F BODY MASS INDEX DOCD: CPT | Performed by: PHYSICIAN ASSISTANT

## 2020-03-20 PROCEDURE — 99024 POSTOP FOLLOW-UP VISIT: CPT | Performed by: PHYSICIAN ASSISTANT

## 2020-03-20 PROCEDURE — 3078F DIAST BP <80 MM HG: CPT | Performed by: PHYSICIAN ASSISTANT

## 2020-03-20 PROCEDURE — 3075F SYST BP GE 130 - 139MM HG: CPT | Performed by: PHYSICIAN ASSISTANT

## 2020-03-20 RX ORDER — ATENOLOL 100 MG/1
100 TABLET ORAL DAILY
Qty: 30 TABLET | Refills: 6 | Status: SHIPPED | OUTPATIENT
Start: 2020-03-20 | End: 2021-02-05 | Stop reason: SDUPTHER

## 2020-03-20 RX ORDER — AMLODIPINE BESYLATE 5 MG/1
5 TABLET ORAL DAILY
Qty: 30 TABLET | Refills: 3 | Status: SHIPPED | OUTPATIENT
Start: 2020-03-20 | End: 2020-10-05 | Stop reason: SDUPTHER

## 2020-03-20 RX ORDER — HYDROXYCHLOROQUINE SULFATE 200 MG/1
200 TABLET, FILM COATED ORAL 2 TIMES DAILY WITH MEALS
Refills: 0 | OUTPATIENT
Start: 2020-03-20

## 2020-03-20 RX ORDER — MELOXICAM 15 MG/1
TABLET ORAL
COMMUNITY
Start: 2020-03-03

## 2020-04-24 ENCOUNTER — TELEMEDICINE (OUTPATIENT)
Dept: OBGYN CLINIC | Facility: CLINIC | Age: 60
End: 2020-04-24

## 2020-04-24 DIAGNOSIS — Z98.890 STATUS POST LEFT ROTATOR CUFF REPAIR: Primary | ICD-10-CM

## 2020-04-24 PROCEDURE — 99024 POSTOP FOLLOW-UP VISIT: CPT | Performed by: PHYSICIAN ASSISTANT

## 2020-06-25 ENCOUNTER — OFFICE VISIT (OUTPATIENT)
Dept: CARDIOLOGY CLINIC | Facility: CLINIC | Age: 60
End: 2020-06-25
Payer: COMMERCIAL

## 2020-06-25 VITALS
WEIGHT: 250 LBS | SYSTOLIC BLOOD PRESSURE: 136 MMHG | DIASTOLIC BLOOD PRESSURE: 80 MMHG | HEIGHT: 67 IN | OXYGEN SATURATION: 98 % | HEART RATE: 72 BPM | TEMPERATURE: 97.3 F | BODY MASS INDEX: 39.24 KG/M2

## 2020-06-25 DIAGNOSIS — Z96.653 HISTORY OF BILATERAL KNEE REPLACEMENT: ICD-10-CM

## 2020-06-25 DIAGNOSIS — E78.5 DYSLIPIDEMIA: ICD-10-CM

## 2020-06-25 DIAGNOSIS — R06.02 EXERTIONAL SHORTNESS OF BREATH: ICD-10-CM

## 2020-06-25 DIAGNOSIS — R94.31 EKG ABNORMALITY: ICD-10-CM

## 2020-06-25 DIAGNOSIS — E66.9 OBESITY (BMI 35.0-39.9 WITHOUT COMORBIDITY): ICD-10-CM

## 2020-06-25 DIAGNOSIS — I10 ESSENTIAL HYPERTENSION: ICD-10-CM

## 2020-06-25 PROCEDURE — 3008F BODY MASS INDEX DOCD: CPT | Performed by: INTERNAL MEDICINE

## 2020-06-25 PROCEDURE — 1036F TOBACCO NON-USER: CPT | Performed by: INTERNAL MEDICINE

## 2020-06-25 PROCEDURE — 3075F SYST BP GE 130 - 139MM HG: CPT | Performed by: INTERNAL MEDICINE

## 2020-06-25 PROCEDURE — 99214 OFFICE O/P EST MOD 30 MIN: CPT | Performed by: INTERNAL MEDICINE

## 2020-06-25 PROCEDURE — 3079F DIAST BP 80-89 MM HG: CPT | Performed by: INTERNAL MEDICINE

## 2020-08-26 LAB
ALBUMIN SERPL-MCNC: 4.5 G/DL (ref 3.8–4.8)
ALBUMIN/GLOB SERPL: 2 {RATIO} (ref 1.2–2.2)
ALP SERPL-CCNC: 127 IU/L (ref 39–117)
ALT SERPL-CCNC: 21 IU/L (ref 0–32)
AST SERPL-CCNC: 22 IU/L (ref 0–40)
BASOPHILS # BLD AUTO: 0 X10E3/UL (ref 0–0.2)
BASOPHILS NFR BLD AUTO: 1 %
BILIRUB SERPL-MCNC: 0.5 MG/DL (ref 0–1.2)
BUN SERPL-MCNC: 18 MG/DL (ref 8–27)
BUN/CREAT SERPL: 22 (ref 12–28)
CALCIUM SERPL-MCNC: 9.6 MG/DL (ref 8.7–10.3)
CHLORIDE SERPL-SCNC: 102 MMOL/L (ref 96–106)
CHOLEST SERPL-MCNC: 174 MG/DL (ref 100–199)
CHOLEST/HDLC SERPL: 4.5 RATIO (ref 0–4.4)
CO2 SERPL-SCNC: 26 MMOL/L (ref 20–29)
CREAT SERPL-MCNC: 0.81 MG/DL (ref 0.57–1)
EOSINOPHIL # BLD AUTO: 0.1 X10E3/UL (ref 0–0.4)
EOSINOPHIL NFR BLD AUTO: 4 %
ERYTHROCYTE [DISTWIDTH] IN BLOOD BY AUTOMATED COUNT: 12.8 % (ref 11.7–15.4)
GLOBULIN SER-MCNC: 2.2 G/DL (ref 1.5–4.5)
GLUCOSE SERPL-MCNC: 105 MG/DL (ref 65–99)
HCT VFR BLD AUTO: 38.7 % (ref 34–46.6)
HDLC SERPL-MCNC: 39 MG/DL
HGB BLD-MCNC: 13 G/DL (ref 11.1–15.9)
IMM GRANULOCYTES # BLD: 0 X10E3/UL (ref 0–0.1)
IMM GRANULOCYTES NFR BLD: 0 %
LDLC SERPL CALC-MCNC: 110 MG/DL (ref 0–99)
LYMPHOCYTES # BLD AUTO: 0.9 X10E3/UL (ref 0.7–3.1)
LYMPHOCYTES NFR BLD AUTO: 25 %
MCH RBC QN AUTO: 31.8 PG (ref 26.6–33)
MCHC RBC AUTO-ENTMCNC: 33.6 G/DL (ref 31.5–35.7)
MCV RBC AUTO: 95 FL (ref 79–97)
MONOCYTES # BLD AUTO: 0.3 X10E3/UL (ref 0.1–0.9)
MONOCYTES NFR BLD AUTO: 8 %
NEUTROPHILS # BLD AUTO: 2.1 X10E3/UL (ref 1.4–7)
NEUTROPHILS NFR BLD AUTO: 62 %
PLATELET # BLD AUTO: 206 X10E3/UL (ref 150–450)
POTASSIUM SERPL-SCNC: 4.3 MMOL/L (ref 3.5–5.2)
PROT SERPL-MCNC: 6.7 G/DL (ref 6–8.5)
RBC # BLD AUTO: 4.09 X10E6/UL (ref 3.77–5.28)
SL AMB EGFR AFRICAN AMERICAN: 90 ML/MIN/1.73
SL AMB EGFR NON AFRICAN AMERICAN: 78 ML/MIN/1.73
SL AMB VLDL CHOLESTEROL CALC: 25 MG/DL (ref 5–40)
SODIUM SERPL-SCNC: 140 MMOL/L (ref 134–144)
TRIGL SERPL-MCNC: 127 MG/DL (ref 0–149)
TSH SERPL DL<=0.005 MIU/L-ACNC: 3.81 UIU/ML (ref 0.45–4.5)
WBC # BLD AUTO: 3.4 X10E3/UL (ref 3.4–10.8)

## 2020-08-28 ENCOUNTER — TELEPHONE (OUTPATIENT)
Dept: CARDIOLOGY CLINIC | Facility: CLINIC | Age: 60
End: 2020-08-28

## 2020-08-28 NOTE — TELEPHONE ENCOUNTER
I spoke with patient, made aware of results as well as recommendations  She would like to try working more on her diet first, and if she is ready to start she will make us aware

## 2020-08-28 NOTE — TELEPHONE ENCOUNTER
----- Message from Jennyfer Marx MD sent at 8/26/2020 10:01 AM EDT -----  Patient's labs reviewed  Patient has some increase in her cholesterol and low HDL  Her risk is around 6 8-7%  She may end up on statin therapy  If she is willing we can consider low-dose statin therapy like Pravachol 10 mg

## 2020-08-30 DIAGNOSIS — I10 ESSENTIAL HYPERTENSION: ICD-10-CM

## 2020-08-30 RX ORDER — HYDROCHLOROTHIAZIDE 25 MG/1
TABLET ORAL
Qty: 30 TABLET | Refills: 6 | Status: SHIPPED | OUTPATIENT
Start: 2020-08-30 | End: 2020-10-05 | Stop reason: SDUPTHER

## 2020-09-08 ENCOUNTER — HOSPITAL ENCOUNTER (OUTPATIENT)
Dept: RADIOLOGY | Facility: HOSPITAL | Age: 60
Discharge: HOME | End: 2020-09-08
Attending: INTERNAL MEDICINE
Payer: COMMERCIAL

## 2020-09-08 ENCOUNTER — TRANSCRIBE ORDERS (OUTPATIENT)
Dept: ADMINISTRATIVE | Facility: HOSPITAL | Age: 60
End: 2020-09-08

## 2020-09-08 DIAGNOSIS — M06.09 RHEUMATOID ARTHRITIS OF MULTIPLE SITES WITHOUT RHEUMATOID FACTOR (HCC): ICD-10-CM

## 2020-09-08 DIAGNOSIS — M06.09 RHEUMATOID ARTHRITIS OF MULTIPLE SITES WITHOUT RHEUMATOID FACTOR (HCC): Primary | ICD-10-CM

## 2020-09-08 PROCEDURE — 72050 X-RAY EXAM NECK SPINE 4/5VWS: CPT

## 2020-09-09 ENCOUNTER — OFFICE VISIT (OUTPATIENT)
Dept: OBGYN CLINIC | Facility: CLINIC | Age: 60
End: 2020-09-09
Payer: COMMERCIAL

## 2020-09-09 ENCOUNTER — APPOINTMENT (OUTPATIENT)
Dept: RADIOLOGY | Facility: CLINIC | Age: 60
End: 2020-09-09
Payer: COMMERCIAL

## 2020-09-09 VITALS
DIASTOLIC BLOOD PRESSURE: 91 MMHG | TEMPERATURE: 97.2 F | SYSTOLIC BLOOD PRESSURE: 153 MMHG | HEART RATE: 66 BPM | HEIGHT: 67 IN | BODY MASS INDEX: 38.96 KG/M2 | WEIGHT: 248.2 LBS

## 2020-09-09 DIAGNOSIS — M75.82 ROTATOR CUFF TENDINITIS, LEFT: ICD-10-CM

## 2020-09-09 DIAGNOSIS — Z12.11 ENCOUNTER FOR SCREENING COLONOSCOPY: ICD-10-CM

## 2020-09-09 DIAGNOSIS — M67.814 BICEPS TENDINOSIS OF LEFT SHOULDER: Primary | ICD-10-CM

## 2020-09-09 DIAGNOSIS — M25.512 LEFT SHOULDER PAIN, UNSPECIFIED CHRONICITY: ICD-10-CM

## 2020-09-09 PROCEDURE — 99214 OFFICE O/P EST MOD 30 MIN: CPT | Performed by: ORTHOPAEDIC SURGERY

## 2020-09-09 PROCEDURE — 73030 X-RAY EXAM OF SHOULDER: CPT

## 2020-09-09 PROCEDURE — 20610 DRAIN/INJ JOINT/BURSA W/O US: CPT | Performed by: ORTHOPAEDIC SURGERY

## 2020-09-09 RX ORDER — DEXAMETHASONE SODIUM PHOSPHATE 100 MG/10ML
40 INJECTION INTRAMUSCULAR; INTRAVENOUS
Status: COMPLETED | OUTPATIENT
Start: 2020-09-09 | End: 2020-09-09

## 2020-09-09 RX ORDER — LIDOCAINE HYDROCHLORIDE 10 MG/ML
1 INJECTION, SOLUTION INFILTRATION; PERINEURAL
Status: COMPLETED | OUTPATIENT
Start: 2020-09-09 | End: 2020-09-09

## 2020-09-09 RX ADMIN — DEXAMETHASONE SODIUM PHOSPHATE 40 MG: 100 INJECTION INTRAMUSCULAR; INTRAVENOUS at 16:09

## 2020-09-09 RX ADMIN — LIDOCAINE HYDROCHLORIDE 1 ML: 10 INJECTION, SOLUTION INFILTRATION; PERINEURAL at 16:09

## 2020-09-09 NOTE — PROGRESS NOTES
Assessment/Plan:  1  Biceps tendinosis of left shoulder  Ambulatory referral to Physical Therapy   2  Encounter for screening colonoscopy  Ambulatory referral for colon cancer education   3  Left shoulder pain, unspecified chronicity  XR shoulder 2+ vw left   4  Rotator cuff tendinitis, left  Ambulatory referral to Physical Therapy       Scribe Attestation    I,:   Drake Jordan am acting as a scribe while in the presence of the attending physician :        I,:   Serge Coley MD personally performed the services described in this documentation    as scribed in my presence :              Rosa M Nunn upon examination and review of the x-rays of the left shoulder does demonstrate signs and symptoms consistent with rotator cuff tendinitis, and biceps long head tendinitis  Overall her range of motion and strength is intact however does demonstrate some weakness into abduction as well as with the speed's test   I did offer her a corticosteroid injection to the biceps long head tendon  She did opt for this treatment today and tolerated the injection well with no complications  I do also believe she will benefit from conservative measures treatment in the form of physical therapy  I did provide her with a referral for this today  I would like her to participate in physical therapy 2 to 3 times a week over the next 4-6 weeks  Rosa M Nunn did verbalize understanding to all the information provided to her today, and had no further questions  If she sees improvements with physical therapy she may continue doing the therapy, and home exercises  Otherwise should she fail to see improvements she may follow up with me in approximately 6 weeks time for repeat clinical evaluation  At that time we may consider an MRI of the left shoulder question recurrent rotator cuff tear      Large joint arthrocentesis  Date/Time: 9/9/2020 4:09 PM  Consent given by: patient  Supporting Documentation  Indications: pain   Procedure Details  Location: shoulder - Shoulder joint: left biceps long head tendon  Needle size: 22 G  Ultrasound guidance: no  Approach: anterior  Medications administered: 1 mL lidocaine 1 %; 40 mg dexamethasone 100 mg/10 mL    Patient tolerance: patient tolerated the procedure well with no immediate complications  Dressing:  Sterile dressing applied            Subjective:   Don Browne is a 56 y o  female who presents to the office today for follow-up evaluation of her left shoulder  She states that she was experiencing severe pain into the superior lateral aspect of her shoulder approximately 60 weeks ago  She denies a traumatic event resulting her painful symptoms  She that overhead, and reaching activities significantly exacerbated her pain  She notes that the pain did radiate distally into the upper arm, however denies radiation past the elbow  She notes that she does have a history of cervical spine issues, and notes that when she was doing a stretching routine he did experience a shooting pain from cervical spine into the arm  Today however she denies any distal paresthesias  Review of Systems   Constitutional: Negative for activity change, chills, fever and unexpected weight change  HENT: Negative for hearing loss, nosebleeds and sore throat  Eyes: Negative for pain, redness and visual disturbance  Respiratory: Negative for cough, shortness of breath and wheezing  Cardiovascular: Negative for chest pain, palpitations and leg swelling  Gastrointestinal: Negative for abdominal pain, nausea and vomiting  Endocrine: Negative for polydipsia and polyuria  Genitourinary: Negative for dysuria and hematuria  Musculoskeletal: Positive for arthralgias and myalgias  See HPI   Skin: Negative for rash and wound  Neurological: Negative for dizziness, numbness and headaches  Psychiatric/Behavioral: Negative for decreased concentration and suicidal ideas   The patient is not nervous/anxious            Past Medical History:   Diagnosis Date    Diverticulitis, colon     Hypertension     Hypothyroidism     h/o " borderline "  issues    Liver cyst     last assessed 4/18/16    Lyme disease     Positive Anaplasma serology     [facility]) [facility] [address] [facility]     Seizure disorder in pregnancy ([facility] [address] [facility] )     had 1 seizure x1 1986 during pregnancy- none after    Varicose vein of leg        Past Surgical History:   Procedure Laterality Date    BACK SURGERY      fusion/refusion of 2-8 vertebrae    COLECTOMY      partial sigmoid, last assessed 4/18/16    JOINT REPLACEMENT      LAPAROSCOPY      with adnexectomy    LIVER SURGERY      GA ANTOINE García Certain Right 3/12/2020    Procedure: ARTHROSCOPY SHOULDER, BICEPS TENODESIS;  Surgeon: Serge Coley MD;  Location: [facility];  Service: Orthopedics    GA ANTOINE ARTHROSCOP,SURG,W/ROTAT CUFF REPR Left 1/3/2019    Procedure: REPAIR ROTATOR CUFF  ARTHROSCOPIC, BICEPTS TENODESIS, SUBACROMIAL DECOMPRESION;  Surgeon: Serge Coley MD;  Location: [facility];  Service: Orthopedics    GA ANTOINE ARTHROSCOP,SURG,W/ROTAT CUFF REPR Right 3/12/2020    Procedure: REPAIR ROTATOR CUFF  ARTHROSCOPIC WITH SUBACROMIAL DECOMPRESSION;  Surgeon: Serge Coley MD;  Location: [facility];  Service: Orthopedics    REPLACEMENT TOTAL KNEE Bilateral     SINUS SURGERY      TONSILLECTOMY      TYMPANOSTOMY TUBE PLACEMENT         Family History   Problem Relation Age of Onset    Hypertension Mother     Pancreatic cancer Mother 78    Arthritis Father     Hyperthyroidism Father     Hypertension Father     No Known Problems Sister     No Known Problems Brother     No Known Problems Maternal Uncle     No Known Problems Paternal Aunt     No Known Problems Paternal Uncle     No Known Problems Maternal Grandmother     No Known Problems Maternal Grandfather     No Known Problems Paternal Grandmother     No Known Problems Paternal Grandfather     No Known Problems Daughter     No Known Problems Daughter     No Known Problems Daughter     No Known Problems Maternal Aunt     ADD / ADHD Neg Hx     Anesthesia problems Neg Hx     Cancer Neg Hx     Clotting disorder Neg Hx     Collagen disease Neg Hx     Diabetes Neg Hx     Dislocations Neg Hx     Learning disabilities Neg Hx     Neurological problems Neg Hx     Osteoporosis Neg Hx     Rheumatologic disease Neg Hx     Scoliosis Neg Hx     Vascular Disease Neg Hx        Social History     Occupational History    Not on file   Tobacco Use    Smoking status: Never Smoker    Smokeless tobacco: Never Used   Substance and Sexual Activity    Alcohol use: No    Drug use: No    Sexual activity: Not on file         Current Outpatient Medications:     amLODIPine (NORVASC) 5 mg tablet, Take 1 tablet (5 mg total) by mouth daily, Disp: 30 tablet, Rfl: 3    atenolol (TENORMIN) 100 mg tablet, Take 1 tablet (100 mg total) by mouth daily, Disp: 30 tablet, Rfl: 6    cyclobenzaprine (FLEXERIL) 10 mg tablet, Take 1 tablet (10 mg total) by mouth 3 (three) times a day as needed for muscle spasms, Disp: 90 tablet, Rfl: 1    fluticasone (FLONASE) 50 mcg/act nasal spray, 2 sprays into each nostril daily, Disp: 16 g, Rfl: 3    hydrochlorothiazide (HYDRODIURIL) 25 mg tablet, TAKE 1 TABLET BY MOUTH EVERY DAY, Disp: 30 tablet, Rfl: 6    hydroxychloroquine (PLAQUENIL) 200 mg tablet, Take 200 mg by mouth 2 (two) times a day with meals, Disp: , Rfl: 0    loratadine (CLARITIN) 10 mg tablet, Take 1 tablet (10 mg total) by mouth daily, Disp: 90 tablet, Rfl: 3    meloxicam (MOBIC) 15 mg tablet, take 1 tablet by mouth once daily with food or milk if needed, Disp: , Rfl:     Pseudoephedrine-Ibuprofen (ADVIL COLD & SINUS LIQUI-GELS PO), Take by mouth, Disp: , Rfl:     RESTASIS MULTIDOSE 0 05 % ophthalmic emulsion, Administer to both eyes every 12 (twelve) hours  , Disp: , Rfl:     traMADol (ULTRAM) 50 mg tablet, Take 50 mg by mouth every 6 (six) hours as needed for moderate pain, Disp: , Rfl:     oxyCODONE-acetaminophen (PERCOCET) 5-325 mg per tablet, Take 1 tablet by mouth every 4 (four) hours as needed for moderate pain for up to 15 dosesMax Daily Amount: 6 tablets (Patient not taking: Reported on 6/25/2020), Disp: 15 tablet, Rfl: 0    Allergies   Allergen Reactions    Pollen Extract Other (See Comments)     headaches       Objective:  Vitals:    09/09/20 1508   BP: 153/91   Pulse: 66   Temp: (!) 97 2 °F (36 2 °C)       Left Shoulder Exam     Tenderness   Left shoulder tenderness location: sergents patch  Range of Motion   Active abduction: 150   External rotation: 70   Internal rotation 0 degrees: L3     Muscle Strength   Abduction: 4/5   Internal rotation: 5/5   External rotation: 5/5   Biceps: 4/5     Tests   Perez test: negative  Cross arm: negative  Impingement: negative  Drop arm: negative    Other   Erythema: absent  Scars: present (Anterior, lateral, posterior portal incisions are well healed)  Sensation: normal  Pulse: present             Physical Exam  Vitals signs reviewed  Constitutional:       Appearance: She is well-developed  HENT:      Head: Normocephalic and atraumatic  Eyes:      General:         Right eye: No discharge  Left eye: No discharge  Conjunctiva/sclera: Conjunctivae normal    Neck:      Musculoskeletal: Normal range of motion and neck supple  Cardiovascular:      Rate and Rhythm: Normal rate  Pulmonary:      Effort: Pulmonary effort is normal  No respiratory distress  Musculoskeletal:      Comments: As noted in HPI   Skin:     General: Skin is warm and dry  Neurological:      Mental Status: She is alert and oriented to person, place, and time  Psychiatric:         Behavior: Behavior normal          Thought Content:  Thought content normal          Judgment: Judgment normal          I have personally reviewed pertinent films in PACS and my interpretation is as follows:    X-rays of the left shoulder demonstrates posttraumatic change at the greater tuberosity consistent with her previous right shoulder arthroscopy with rotator cuff repair  No acute fractures demonstrated

## 2020-09-15 ENCOUNTER — EVALUATION (OUTPATIENT)
Dept: PHYSICAL THERAPY | Facility: CLINIC | Age: 60
End: 2020-09-15
Payer: COMMERCIAL

## 2020-09-15 DIAGNOSIS — M75.82 ROTATOR CUFF TENDINITIS, LEFT: ICD-10-CM

## 2020-09-15 DIAGNOSIS — M67.814 BICEPS TENDINOSIS OF LEFT SHOULDER: ICD-10-CM

## 2020-09-15 PROCEDURE — 97161 PT EVAL LOW COMPLEX 20 MIN: CPT | Performed by: PHYSICAL THERAPIST

## 2020-09-15 PROCEDURE — 97110 THERAPEUTIC EXERCISES: CPT | Performed by: PHYSICAL THERAPIST

## 2020-09-15 NOTE — PROGRESS NOTES
PT Evaluation     Today's date: 9/15/2020  Patient name: Rosalina Gómez  : 1960  MRN: [id number]  Referring provider: Eren Govea MD  Dx:   Encounter Diagnosis     ICD-10-CM    1  Biceps tendinosis of left shoulder  M67 [id number] Ambulatory referral to Physical Therapy   2  Rotator cuff tendinitis, left  M75 82 Ambulatory referral to Physical Therapy                  Assessment  Assessment details: 9/15/2020: Rosalina Gómez is a 56 y o  female who presents with pain, decreased strength, decreased ROM, decreased joint mobility and postural  dysfunction  Due to these impairments, patient has difficulty performing ADL's, recreational activities, lifting/carrying, reaching  Patient's clinical presentation is consistent with their referring diagnosis of Biceps tendinosis of left shoulder, Rotator cuff tendinitis, left  Cervical mechanical assessment results in decreased pain and improved L UE AROM indicating pt has underlying cervical derangement and will benefit from directional specific exercises and later progression to UE ROM and strengthening as tolerated  Bicep is tender but strong  RC strength is normal  Plan: Ambulatory referral to Physical Therapy  Patient has been educated in postural awareness and mechanical correction, home exercise program and plan of care  Patient would benefit from skilled physical therapy services to address their aforementioned functional limitations and progress towards prior level of function and independence with home exercise program      Impairments: abnormal or restricted ROM, activity intolerance, impaired physical strength, lacks appropriate home exercise program, pain with function, scapular dyskinesis and poor posture   Functional limitations: inc pain w/ functional IR, OH activity, repeated activity and Horiz abd Understanding of Dx/Px/POC: good   Prognosis: good    Goals  Short Term Goals to be met in 4 weeks (10/13/2020)  1   Pt to be independent w/ prelimary HEP  2  Pt will report at least 50% reduction in pain w/ independent use of cervical mechanical correction  3  Improve cervical rotation AROM to full w/o pain  4  Improve AROM flexion and abduction to 145 or better to improve subjective function by at least 25%  5  Pt will demonstrate proper sitting and standing posture 80% of time while in therapy    Long Term Goals to be met in 8 weeks (11/10/2020)  1  L shoulder ER, abd and Flexion AROM to be within 5 degrees of R shoulder w/o pain to allow ease w/ ADL's and IADL's  2  Pt FOTO score will improve from 58 to 68  3  Pt pain will be intermittent 0-2/10 and resolved w/ mechanical correction  4  Pt L IR ROM will be equal to R IR ROM w/o pain to allow pt to put on bra w/o pain  5  Pt will be able to reach over head and lift heavy objects w/o increase in symptoms   6  Improve  strength to 60# or better  Plan  Plan details:       Patient would benefit from: PT eval and skilled physical therapy  Planned modality interventions: cryotherapy, thermotherapy: hydrocollator packs and unattended electrical stimulation  Planned therapy interventions: manual therapy, neuromuscular re-education, therapeutic exercise, therapeutic activities, home exercise program, stretching, patient education and postural training  Frequency: 2x week (2-3x/week)  Duration in weeks: 8  Plan of Care beginning date: 9/15/2020  Plan of Care expiration date: 11/10/2020  Treatment plan discussed with: patient        Subjective Evaluation    History of Present Illness  Mechanism of injury: 9/15/2020 -   About 8 wks ago she woke up with a stiff neck & upper back, w/ shoulder blade and lateral L arm pain  She had a lot of pain but was able to sleep on it  As the weeks moved on she rested it and it recovered about 60%  She still experienced popping in her shoulder and hears some "ripping" (points to her upper trap), she also felt her biceps being tight  She tried some traction with no effect   Hor abd and IR increase her pain, which is constant  She rec'd injection L GH 2020 w NE  PMH significant for L RC repair 2019, R RC repair 3/2020, B/L TKA, lumbar fusion,  and cervical herniated disc          Recurrent probem    Pain  At best pain ratin  At worst pain ratin  Pain location: L upper trap, scap, lateral arm midway  Quality: dull ache and burning  Relieving factors: rest and relaxation  Exacerbated by: hor abd, IR, repetitive movement, driving  Progression: no change    Hand dominance: right      Diagnostic Tests  X-ray: abnormal (cervical DDD, GH xray normal)  Treatments  Current treatment: injection treatment and physical therapy  Patient Goals  Patient goals for therapy: decreased pain and increased motion          Objective    Flowsheet Rows      Most Recent Value   PT/OT G-Codes   Current Score  58   Projected Score  68        Pain(*)    Objective:    9/15/2020  9/15/2020  AROM: standing  R   L  Shoulder flexion  142   130  Shoulder abduction  135   135  Shoulder [email or web address]  75   65  Shoulder IR   T10    T12*        9/15/2020  9/15/2020  STRENGTH:    R   L    Shoulder flexion  4/5   5/5  Shoulder abduction  4+/5   5/5  Shoulder [email or web address]  5/5   4+/5*  Shoulder IR   5/5   5/5       65 pounds  55 pounds  shld flex palm up  5/5   5/5    Posture: Pt's sitting posture is fair, mild forward head  Shoulders are level       Cervical Screen: cervical AROM limitations    9/15/2020  Flexion  nil  Extension Mod/clarita  R rotation min  L rotation mod*  R sidebend clarita  L sidebend mod  Retraction min    Mechanical assessment: pretest symptoms 3/10 L lat UE/Utrap    9/15/2020- rep retraction 1x15 = NE    9/15/2020- Repeated retraction w/ OP 2x10 = DEC/B 1/10; inc AROM shld flex  Special Tests:     9/15/2020- Speeds test - normal       Precautions: BL TKA, R RC repair 3/2020, L RC repair 2019, lumbar fusion, autoimmune hx  ORTIZ: 9/15/2020  POC expiration:   FOTO: 9/15/2020  Daily Treatment log:  Date 9/15/2020       Visit # 1       Manual                                There Exer         retraction w/ OP 2x10       Sup retraction w/ 5" hold        BL Sup shld flex        Sup cane ER         SL shld abd        Sit  thoracic ext                                        HEP        NMRed        BL Ext w/ scap set         BL ER w/ scap set                                                                 Modalities                                Access Code: [email or web address]   URL: [email or web address]   Date: 09/15/2020   Prepared by: [place]      Exercises  · Seated Passive Cervical Retraction - 10 reps - 2 sets - 2 hold - 1x daily - 7x weekly  Instructed for use of lumbar roll and importance of postural correction  Reviewed cancellation and no-show policy w/ patient  Pt expresses understanding future appointments will be removed if one appointment is missed as a no-show or if there are two last minute cancellations  The option of a virtual visit was explained to help avoid missing a session

## 2020-09-18 ENCOUNTER — OFFICE VISIT (OUTPATIENT)
Dept: PHYSICAL THERAPY | Facility: CLINIC | Age: 60
End: 2020-09-18
Payer: COMMERCIAL

## 2020-09-18 DIAGNOSIS — M75.82 ROTATOR CUFF TENDINITIS, LEFT: ICD-10-CM

## 2020-09-18 DIAGNOSIS — M67.814 BICEPS TENDINOSIS OF LEFT SHOULDER: Primary | ICD-10-CM

## 2020-09-18 PROCEDURE — 97112 NEUROMUSCULAR REEDUCATION: CPT | Performed by: PHYSICAL THERAPIST

## 2020-09-18 PROCEDURE — 97110 THERAPEUTIC EXERCISES: CPT | Performed by: PHYSICAL THERAPIST

## 2020-09-18 NOTE — PROGRESS NOTES
Daily Note     Today's date: 2020  Patient name: Yolie Self  : 1960  MRN: [id number]  Referring provider: Luz Anderson MD  Dx:   Encounter Diagnosis     ICD-10-CM    1  Biceps tendinosis of left shoulder  M67 814    2  Rotator cuff tendinitis, left  M75 82                   Subjective: pt reported feeling slightly better with a pain 4/10 when she came into the visit today  She reported having done her exercises daily and that she performed them twice this morning  She expressed having noticed an improvement in her neck symptoms with no change in her arm symptoms  She reported some shoulder fatigue during the session which resolved in < 5min  Objective: See treatment diary below  Cerv AROM: L rotation min/mod boss upon arrival  AROM arrival    R   L  Sld flex  128  165  sld abd  126  160     Rep retr 2x10 = dec/B -  Improved cerv AROM and UE AROM symmetrical     Assessment: Tolerated treatment well  Patient would benefit from continued PT and shows improvement in pain and cervical rotation ROM with TRUPTI  pt is progressing well towards goals as per original POC  Plan: Continue per plan of care  Progress treatment as tolerated  issued blue TB for updated HEP  Precautions: BL TKA, R RC repair 3/2020, L RC repair 2019, lumbar fusion, autoimmune hx  ORTIZ: 9/15/2020  POC expiration:   FOTO: 9/15/2020  Daily Treatment log:  Date 9/15/2020 2020      Visit # 1 2      Manual                                There Exer  35'       retraction w/ OP 2x10 2x10      Sup retraction w/ 5" hold   2x10      BL Sup shld flex w/cane thumbs up  2x10       Sup cane ER   5' x10 ; x2      SL shld abd  2x10       Sit   thoracic ext  1x10*                                      HEP  Updated       NMRed  10'      BL Ext w/ scap set   Blue 2x10       BL ER w/ scap set   Yellow 2x10                                                               Modalities                                Access Code: [email or web address]   URL: [employer]  com/   Date: 09/15/2020   Prepared by: [place]       Exercises  · Seated Passive Cervical Retraction - 10 reps - 2 sets - 2 hold - 6x daily - 7x weekly  · Sidelying Shoulder Abduction Full Range of Motion - 10 reps - 2 sets - 1x daily - 7x weekly  · Supine Shoulder External Rotation with Dowel - 10 reps - 2 sets - 2 hold - 1x daily - 7x weekly  · Standing shoulder flexion wall slides - 10 reps - 2 sets - 5 hold - 1x daily - 7x weekly  · Shoulder Extension with Resistance - 10 reps - 2 sets - 1x daily - 7x weekly    ·   Instructed for use of lumbar roll and importance of postural correction  Reviewed cancellation and no-show policy w/ patient  Pt expresses understanding future appointments will be removed if one appointment is missed as a no-show or if there are two last minute cancellations  The option of a virtual visit was explained to help avoid missing a session

## 2020-09-21 ENCOUNTER — OFFICE VISIT (OUTPATIENT)
Dept: PHYSICAL THERAPY | Facility: CLINIC | Age: 60
End: 2020-09-21
Payer: COMMERCIAL

## 2020-09-21 DIAGNOSIS — M75.82 ROTATOR CUFF TENDINITIS, LEFT: ICD-10-CM

## 2020-09-21 DIAGNOSIS — M67.814 BICEPS TENDINOSIS OF LEFT SHOULDER: Primary | ICD-10-CM

## 2020-09-21 PROCEDURE — 97110 THERAPEUTIC EXERCISES: CPT | Performed by: PHYSICAL THERAPIST

## 2020-09-21 PROCEDURE — 97140 MANUAL THERAPY 1/> REGIONS: CPT | Performed by: PHYSICAL THERAPIST

## 2020-09-21 NOTE — PROGRESS NOTES
Daily Note     Today's date: 2020  Patient name: Jun Jean  : 1960  MRN: [id number]  Referring provider: Leonardo Frederick MD  Dx:   Encounter Diagnosis     ICD-10-CM    1  Biceps tendinosis of left shoulder  M67 814    2  Rotator cuff tendinitis, left  M75 82                   Subjective: pt reported feeling sore on Friday and increased pain 8/10 on Saturday that caused her to sleep in the recliner (she could not lay down on it)  She was cleaning on Saturday but she does not attribute her pain to those activities   she woke up with no pain  Today, her neck feels very good with no pain1/10  just a bit achey and her shoulder has decreased pain 1/10 since she started  She reported cracking with sup ER w/cane which caused her pain 7/10 but feeling well with all other HEP  She reports her pain feeling more isolated to her shoulder from when she started but still having difficulty (her arms get tiered) with over head activity  Objective: See treatment diary below  After doing IASTM pt had some tingling in her posterior arm and some poping feeling which resolved after repeated retraction w/OP  Assessment: Tolerated treatment well  Patient would benefit from continued PT and continued cervical retraction based exercises to continue decreasing her neck and shoulder pain  Pt has been tolerating treatments well with increased in pain with supine ER w/cane exercises  ER stretching activities will be introduced at the patients tolerance in order to decrease pain and improve ROM  PT is progressing well towards goals as per original POC  Plan: Continue per plan of care  Progress treatment as tolerated  Assess with the pt how IASTM felt after the session        Precautions: BL TKA, R RC repair 3/2020, L RC repair 2019, lumbar fusion, autoimmune hx  ORTIZ: 9/15/2020  POC expiration:   FOTO: 9/15/2020  Daily Treatment log:  Date 9/15/2020 2020 2020     Visit # 1 2 3     Manual 10'     IASTM over biceps tendon    10'                     There Exer  35' 25'      retraction w/ OP 2x10 2x10 2x10     Sup retraction w/ 5" hold   2x10 2x10      BL Sup shld flex w/cane thumbs up  2x10       Sup cane ER   5' x10 ; x2 HOLD     SL shld abd  2x10  2x10 R/L      Sit  thoracic ext  1x10* 1x10*     Standing abd stretch w/cane   5"x10 R/L                             HEP  Updated       NMRed  10' 10'     BL Ext w/ scap set   Blue 2x10  Blue 2x10      BL ER w/ scap set   Yellow 2x10  Blue 2x10      Wall slides    2x10                                                      Modalities                                Access Code: [email or web address]   URL: [employer]  com/   Date: 09/15/2020   Prepared by: Nini Roberson       Exercises  · Seated Passive Cervical Retraction - 10 reps - 2 sets - 2 hold - 6x daily - 7x weekly  · Sidelying Shoulder Abduction Full Range of Motion - 10 reps - 2 sets - 1x daily - 7x weekly  · Supine Shoulder External Rotation with Dowel - 10 reps - 2 sets - 2 hold - 1x daily - 7x weekly  · Standing shoulder flexion wall slides - 10 reps - 2 sets - 5 hold - 1x daily - 7x weekly  · Shoulder Extension with Resistance - 10 reps - 2 sets - 1x daily - 7x weekly    ·   Instructed for use of lumbar roll and importance of postural correction  Reviewed cancellation and no-show policy w/ patient  Pt expresses understanding future appointments will be removed if one appointment is missed as a no-show or if there are two last minute cancellations  The option of a virtual visit was explained to help avoid missing a session

## 2020-09-24 ENCOUNTER — OFFICE VISIT (OUTPATIENT)
Dept: PHYSICAL THERAPY | Facility: CLINIC | Age: 60
End: 2020-09-24
Payer: COMMERCIAL

## 2020-09-24 DIAGNOSIS — M67.814 BICEPS TENDINOSIS OF LEFT SHOULDER: Primary | ICD-10-CM

## 2020-09-24 DIAGNOSIS — M75.82 ROTATOR CUFF TENDINITIS, LEFT: ICD-10-CM

## 2020-09-24 PROCEDURE — 97110 THERAPEUTIC EXERCISES: CPT | Performed by: PHYSICAL THERAPIST

## 2020-09-24 PROCEDURE — 97112 NEUROMUSCULAR REEDUCATION: CPT | Performed by: PHYSICAL THERAPIST

## 2020-09-24 PROCEDURE — 97140 MANUAL THERAPY 1/> REGIONS: CPT | Performed by: PHYSICAL THERAPIST

## 2020-09-24 NOTE — PROGRESS NOTES
Daily Note     Today's date: 2020  Patient name: Prudencio Arce  : 1960  MRN: [id number]  Referring provider: Deng Bond MD  Dx:   Encounter Diagnosis     ICD-10-CM    1  Biceps tendinosis of left shoulder  M67 814    2  Rotator cuff tendinitis, left  M75 82                   Subjective: pt report feeling alright  On Monday she had a hard time sleeping on her L shoulder and experienced soreness on  night she was able to sleep better  She reported that her neck is feeling better and it is causing her less discomfort while sleeping  She says her L shoulder pain is localizing since starting PT to her [address] jt/ LH and SH biceps tendons origins  She reports that if she is sitting on her chair and reach back to grab a drink she experience pain/ pulling and popping  Objective: See treatment diary below  Palpation: tenderness @ proximal biceps tendons  Assessment: Tolerated treatment well  Patient would benefit from continued PT  Pt will benefit from continued biceps stretching, IASTM and strengthening exercises to address her localized pain and assess if that decreases/address her pain  Overall pt is progressing well towards her goal, with decreased pain and increased QOL due to improved ability/ quality of sleep  Plan: Continue per plan of care  Precautions: BL TKA, R RC repair 3/2020, L RC repair 2019, lumbar fusion, autoimmune hx  ORTIZ: 9/15/2020  POC expiration:   FOTO: 9/15/2020  Daily Treatment log:  Date 9/15/2020 2020 2020 2020    Visit # 1 2 3 4    Manual   10' 10'    IASTM over biceps tendon    10' 10'                    There Exer  35' 25' 20'     retraction w/ OP 2x10 2x10 2x10 2x10     Sup retraction w/ 5" hold   2x10 2x10      BL Sup shld flex w/cane thumbs up  2x10       Sup cane ER   5' x10 ; x2 HOLD     SL shld abd  2x10  2x10 R/L  2x10 R/L    Sit   thoracic ext  1x10* 1x10* TRUPTI at wall   1x10     Standing abd stretch w/cane   5"x10 R/L 5"x10 R/L                             HEP  Updated       NMRed  10' 10' 15'    BL Ext w/ scap set   Blue 2x10  Blue 2x10  Blue 2x10     BL ER w/ scap set   Yellow 2x10  Blue 2x10  Blue 2x10     Wall slides    2x10  2x10     Sleeper stretch     L- SL w/ elbow up  R- 10"x10     Shld ext stretch w/cane    5"x10                                     Modalities                                Access Code: [email or web address]   URL: [employer]/   Date: 09/15/2020   Prepared by: Sara Yoon       Exercises  · Seated Passive Cervical Retraction - 10 reps - 2 sets - 2 hold - 6x daily - 7x weekly  · Sidelying Shoulder Abduction Full Range of Motion - 10 reps - 2 sets - 1x daily - 7x weekly  · Supine Shoulder External Rotation with Dowel - 10 reps - 2 sets - 2 hold - 1x daily - 7x weekly  · Standing shoulder flexion wall slides - 10 reps - 2 sets - 5 hold - 1x daily - 7x weekly  · Shoulder Extension with Resistance - 10 reps - 2 sets - 1x daily - 7x weekly    ·   Instructed for use of lumbar roll and importance of postural correction  Reviewed cancellation and no-show policy w/ patient  Pt expresses understanding future appointments will be removed if one appointment is missed as a no-show or if there are two last minute cancellations  The option of a virtual visit was explained to help avoid missing a session

## 2020-09-28 ENCOUNTER — OFFICE VISIT (OUTPATIENT)
Dept: PHYSICAL THERAPY | Facility: CLINIC | Age: 60
End: 2020-09-28
Payer: COMMERCIAL

## 2020-09-28 DIAGNOSIS — M67.814 BICEPS TENDINOSIS OF LEFT SHOULDER: Primary | ICD-10-CM

## 2020-09-28 DIAGNOSIS — M75.82 ROTATOR CUFF TENDINITIS, LEFT: ICD-10-CM

## 2020-09-28 PROCEDURE — 97140 MANUAL THERAPY 1/> REGIONS: CPT

## 2020-09-28 PROCEDURE — 97112 NEUROMUSCULAR REEDUCATION: CPT

## 2020-09-28 PROCEDURE — 97110 THERAPEUTIC EXERCISES: CPT

## 2020-09-28 NOTE — PROGRESS NOTES
Daily Note     Today's date: 2020  Patient name: Val Khalil  : 1960  MRN: [id number]  Referring provider: Fabiola Trevino MD  Dx:   Encounter Diagnosis     ICD-10-CM    1  Biceps tendinosis of left shoulder  M67 814    2  Rotator cuff tendinitis, left  M75 82                   Subjective: pt reports 4/10 on arrival today, she was doing a lot over the weekend around her house (sanding/scrubbing/walking sticks) and she is feeling it  Objective: See treatment diary below      Assessment: Tolerated treatment well  Patient exhibited good technique with therapeutic exercises pt with no relief post session, cont with increased soreness in ant shoulder       Plan: Continue per plan of care  Precautions: BL TKA, R RC repair 3/2020, L RC repair 2019, lumbar fusion, autoimmune hx  ORTIZ: 9/15/2020  POC expiration:   FOTO: 9/15/2020  Daily Treatment log:  Date 9/15/2020 2020 2020 2020 9/28/20   Visit # 1 2 3 4    Manual   10' 10' 10'   IASTM over biceps tendon    10' 10' 10'                   There Exer  35' 25' 20' 20'    retraction w/ OP 2x10 2x10 2x10 2x10  2x10    Sup retraction w/ 5" hold   2x10 2x10      BL Sup shld flex w/cane thumbs up  2x10       Sup cane ER   5' x10 ; x2 HOLD     SL shld abd  2x10  2x10 R/L  2x10 R/L    Sit  thoracic ext  1x10* 1x10* TRUPTI at wall   1x10  TRUPTI at wall   1x10    Standing abd stretch w/cane   5"x10 R/L 5"x10 R/L  5"x10 R/L                             HEP  Updated       NMRed  10' 10' 15' 15'   BL Ext w/ scap set   Blue 2x10  Blue 2x10  Blue 2x10  Blue 2x10     BL Row w/ scap set      Blue 2x10     BL ER w/ scap set   Yellow 2x10  Blue 2x10  Blue 2x10  Blue 2x10    Wall slides    2x10  2x10  5" 2x10    Sleeper stretch     L- SL w/ elbow up  R- 10"x10     Shld ext stretch w/cane    5"x10  5"x10                                     Modalities                                Access Code: [email or web address]   URL: [employer]  com/   Date: 09/15/2020   Prepared by: Kathia Machado       Exercises  · Seated Passive Cervical Retraction - 10 reps - 2 sets - 2 hold - 6x daily - 7x weekly  · Sidelying Shoulder Abduction Full Range of Motion - 10 reps - 2 sets - 1x daily - 7x weekly  · Supine Shoulder External Rotation with Dowel - 10 reps - 2 sets - 2 hold - 1x daily - 7x weekly  · Standing shoulder flexion wall slides - 10 reps - 2 sets - 5 hold - 1x daily - 7x weekly  · Shoulder Extension with Resistance - 10 reps - 2 sets - 1x daily - 7x weekly    ·   Instructed for use of lumbar roll and importance of postural correction  Reviewed cancellation and no-show policy w/ patient  Pt expresses understanding future appointments will be removed if one appointment is missed as a no-show or if there are two last minute cancellations  The option of a virtual visit was explained to help avoid missing a session

## 2020-10-01 ENCOUNTER — OFFICE VISIT (OUTPATIENT)
Dept: PHYSICAL THERAPY | Facility: CLINIC | Age: 60
End: 2020-10-01
Payer: COMMERCIAL

## 2020-10-01 DIAGNOSIS — M67.814 BICEPS TENDINOSIS OF LEFT SHOULDER: Primary | ICD-10-CM

## 2020-10-01 DIAGNOSIS — M75.82 ROTATOR CUFF TENDINITIS, LEFT: ICD-10-CM

## 2020-10-01 PROCEDURE — 97112 NEUROMUSCULAR REEDUCATION: CPT | Performed by: PHYSICAL THERAPIST

## 2020-10-01 PROCEDURE — 97110 THERAPEUTIC EXERCISES: CPT | Performed by: PHYSICAL THERAPIST

## 2020-10-05 ENCOUNTER — OFFICE VISIT (OUTPATIENT)
Dept: PHYSICAL THERAPY | Facility: CLINIC | Age: 60
End: 2020-10-05
Payer: COMMERCIAL

## 2020-10-05 ENCOUNTER — TELEPHONE (OUTPATIENT)
Dept: OBGYN CLINIC | Facility: CLINIC | Age: 60
End: 2020-10-05

## 2020-10-05 DIAGNOSIS — I10 ESSENTIAL HYPERTENSION: ICD-10-CM

## 2020-10-05 DIAGNOSIS — M67.814 BICEPS TENDINOSIS OF LEFT SHOULDER: Primary | ICD-10-CM

## 2020-10-05 DIAGNOSIS — M75.82 ROTATOR CUFF TENDINITIS, LEFT: ICD-10-CM

## 2020-10-05 DIAGNOSIS — M62.838 MUSCLE SPASM: ICD-10-CM

## 2020-10-05 PROCEDURE — 97112 NEUROMUSCULAR REEDUCATION: CPT | Performed by: PHYSICAL THERAPIST

## 2020-10-05 PROCEDURE — 97110 THERAPEUTIC EXERCISES: CPT | Performed by: PHYSICAL THERAPIST

## 2020-10-05 RX ORDER — CYCLOBENZAPRINE HCL 10 MG
10 TABLET ORAL 3 TIMES DAILY PRN
Qty: 90 TABLET | Refills: 1 | Status: SHIPPED | OUTPATIENT
Start: 2020-10-05 | End: 2021-11-29 | Stop reason: ALTCHOICE

## 2020-10-05 RX ORDER — HYDROCHLOROTHIAZIDE 25 MG/1
25 TABLET ORAL DAILY
Qty: 30 TABLET | Refills: 6 | Status: SHIPPED | OUTPATIENT
Start: 2020-10-05 | End: 2021-02-05 | Stop reason: SDUPTHER

## 2020-10-05 RX ORDER — AMLODIPINE BESYLATE 5 MG/1
5 TABLET ORAL DAILY
Qty: 30 TABLET | Refills: 3 | Status: SHIPPED | OUTPATIENT
Start: 2020-10-05 | End: 2021-01-26

## 2020-10-07 ENCOUNTER — OFFICE VISIT (OUTPATIENT)
Dept: OBGYN CLINIC | Facility: CLINIC | Age: 60
End: 2020-10-07
Payer: COMMERCIAL

## 2020-10-07 ENCOUNTER — TELEPHONE (OUTPATIENT)
Dept: OBGYN CLINIC | Facility: HOSPITAL | Age: 60
End: 2020-10-07

## 2020-10-07 VITALS
DIASTOLIC BLOOD PRESSURE: 84 MMHG | BODY MASS INDEX: 38.92 KG/M2 | HEIGHT: 67 IN | SYSTOLIC BLOOD PRESSURE: 148 MMHG | WEIGHT: 248 LBS | TEMPERATURE: 98.9 F | HEART RATE: 76 BPM

## 2020-10-07 DIAGNOSIS — Z98.890 STATUS POST LEFT ROTATOR CUFF REPAIR: ICD-10-CM

## 2020-10-07 DIAGNOSIS — M75.82 ROTATOR CUFF TENDINITIS, LEFT: Primary | ICD-10-CM

## 2020-10-07 PROCEDURE — 99214 OFFICE O/P EST MOD 30 MIN: CPT | Performed by: ORTHOPAEDIC SURGERY

## 2020-10-07 PROCEDURE — 3077F SYST BP >= 140 MM HG: CPT | Performed by: ORTHOPAEDIC SURGERY

## 2020-10-07 PROCEDURE — 3079F DIAST BP 80-89 MM HG: CPT | Performed by: ORTHOPAEDIC SURGERY

## 2020-10-07 PROCEDURE — 1036F TOBACCO NON-USER: CPT | Performed by: ORTHOPAEDIC SURGERY

## 2020-10-08 ENCOUNTER — OFFICE VISIT (OUTPATIENT)
Dept: PHYSICAL THERAPY | Facility: CLINIC | Age: 60
End: 2020-10-08
Payer: COMMERCIAL

## 2020-10-08 DIAGNOSIS — M75.82 ROTATOR CUFF TENDINITIS, LEFT: ICD-10-CM

## 2020-10-08 DIAGNOSIS — M67.814 BICEPS TENDINOSIS OF LEFT SHOULDER: Primary | ICD-10-CM

## 2020-10-08 PROCEDURE — 97110 THERAPEUTIC EXERCISES: CPT

## 2020-10-08 PROCEDURE — 97112 NEUROMUSCULAR REEDUCATION: CPT

## 2020-10-12 ENCOUNTER — OFFICE VISIT (OUTPATIENT)
Dept: PHYSICAL THERAPY | Facility: CLINIC | Age: 60
End: 2020-10-12
Payer: COMMERCIAL

## 2020-10-12 ENCOUNTER — TELEPHONE (OUTPATIENT)
Dept: OBGYN CLINIC | Facility: CLINIC | Age: 60
End: 2020-10-12

## 2020-10-12 DIAGNOSIS — M75.82 ROTATOR CUFF TENDINITIS, LEFT: ICD-10-CM

## 2020-10-12 DIAGNOSIS — M67.814 BICEPS TENDINOSIS OF LEFT SHOULDER: Primary | ICD-10-CM

## 2020-10-12 PROCEDURE — 97110 THERAPEUTIC EXERCISES: CPT | Performed by: PHYSICAL THERAPIST

## 2020-10-12 PROCEDURE — 97112 NEUROMUSCULAR REEDUCATION: CPT | Performed by: PHYSICAL THERAPIST

## 2020-10-14 ENCOUNTER — EVALUATION (OUTPATIENT)
Dept: PHYSICAL THERAPY | Facility: CLINIC | Age: 60
End: 2020-10-14
Payer: COMMERCIAL

## 2020-10-14 ENCOUNTER — APPOINTMENT (OUTPATIENT)
Dept: PHYSICAL THERAPY | Facility: CLINIC | Age: 60
End: 2020-10-14
Payer: COMMERCIAL

## 2020-10-14 DIAGNOSIS — M67.814 BICEPS TENDINOSIS OF LEFT SHOULDER: Primary | ICD-10-CM

## 2020-10-14 DIAGNOSIS — M75.82 ROTATOR CUFF TENDINITIS, LEFT: ICD-10-CM

## 2020-10-14 PROCEDURE — 97110 THERAPEUTIC EXERCISES: CPT | Performed by: PHYSICAL THERAPIST

## 2020-10-14 PROCEDURE — 97112 NEUROMUSCULAR REEDUCATION: CPT | Performed by: PHYSICAL THERAPIST

## 2020-10-19 ENCOUNTER — APPOINTMENT (OUTPATIENT)
Dept: PHYSICAL THERAPY | Facility: CLINIC | Age: 60
End: 2020-10-19
Payer: COMMERCIAL

## 2020-10-19 ENCOUNTER — OFFICE VISIT (OUTPATIENT)
Dept: PHYSICAL THERAPY | Facility: CLINIC | Age: 60
End: 2020-10-19
Payer: COMMERCIAL

## 2020-10-19 DIAGNOSIS — M67.814 BICEPS TENDINOSIS OF LEFT SHOULDER: Primary | ICD-10-CM

## 2020-10-19 DIAGNOSIS — M75.82 ROTATOR CUFF TENDINITIS, LEFT: ICD-10-CM

## 2020-10-19 PROCEDURE — 97140 MANUAL THERAPY 1/> REGIONS: CPT | Performed by: PHYSICAL THERAPIST

## 2020-10-19 PROCEDURE — 97110 THERAPEUTIC EXERCISES: CPT | Performed by: PHYSICAL THERAPIST

## 2020-10-21 ENCOUNTER — OFFICE VISIT (OUTPATIENT)
Dept: PHYSICAL THERAPY | Facility: CLINIC | Age: 60
End: 2020-10-21
Payer: COMMERCIAL

## 2020-10-21 DIAGNOSIS — M67.814 BICEPS TENDINOSIS OF LEFT SHOULDER: Primary | ICD-10-CM

## 2020-10-21 DIAGNOSIS — M75.82 ROTATOR CUFF TENDINITIS, LEFT: ICD-10-CM

## 2020-10-21 PROCEDURE — 97110 THERAPEUTIC EXERCISES: CPT | Performed by: PHYSICAL THERAPIST

## 2020-10-21 PROCEDURE — 97112 NEUROMUSCULAR REEDUCATION: CPT | Performed by: PHYSICAL THERAPIST

## 2020-10-26 ENCOUNTER — OFFICE VISIT (OUTPATIENT)
Dept: PHYSICAL THERAPY | Facility: CLINIC | Age: 60
End: 2020-10-26
Payer: COMMERCIAL

## 2020-10-26 DIAGNOSIS — M67.814 BICEPS TENDINOSIS OF LEFT SHOULDER: Primary | ICD-10-CM

## 2020-10-26 DIAGNOSIS — M75.82 ROTATOR CUFF TENDINITIS, LEFT: ICD-10-CM

## 2020-10-26 PROCEDURE — 97112 NEUROMUSCULAR REEDUCATION: CPT

## 2020-10-26 PROCEDURE — 97110 THERAPEUTIC EXERCISES: CPT

## 2020-10-27 ENCOUNTER — TELEPHONE (OUTPATIENT)
Dept: OBGYN CLINIC | Facility: CLINIC | Age: 60
End: 2020-10-27

## 2020-10-27 ENCOUNTER — TELEPHONE (OUTPATIENT)
Dept: GASTROENTEROLOGY | Facility: CLINIC | Age: 60
End: 2020-10-27

## 2020-10-28 ENCOUNTER — OFFICE VISIT (OUTPATIENT)
Dept: PHYSICAL THERAPY | Facility: CLINIC | Age: 60
End: 2020-10-28
Payer: COMMERCIAL

## 2020-10-28 DIAGNOSIS — M75.82 ROTATOR CUFF TENDINITIS, LEFT: ICD-10-CM

## 2020-10-28 DIAGNOSIS — M67.814 BICEPS TENDINOSIS OF LEFT SHOULDER: Primary | ICD-10-CM

## 2020-10-28 PROCEDURE — 97112 NEUROMUSCULAR REEDUCATION: CPT | Performed by: PHYSICAL THERAPIST

## 2020-10-28 PROCEDURE — 97110 THERAPEUTIC EXERCISES: CPT | Performed by: PHYSICAL THERAPIST

## 2020-10-29 ENCOUNTER — APPOINTMENT (OUTPATIENT)
Dept: PHYSICAL THERAPY | Facility: CLINIC | Age: 60
End: 2020-10-29
Payer: COMMERCIAL

## 2020-11-06 ENCOUNTER — OFFICE VISIT (OUTPATIENT)
Dept: OBGYN CLINIC | Facility: CLINIC | Age: 60
End: 2020-11-06
Payer: COMMERCIAL

## 2020-11-06 VITALS
BODY MASS INDEX: 38.92 KG/M2 | DIASTOLIC BLOOD PRESSURE: 82 MMHG | HEIGHT: 67 IN | SYSTOLIC BLOOD PRESSURE: 148 MMHG | WEIGHT: 248 LBS | HEART RATE: 66 BPM

## 2020-11-06 DIAGNOSIS — M75.112 PARTIAL NONTRAUMATIC TEAR OF LEFT ROTATOR CUFF: Primary | ICD-10-CM

## 2020-11-06 PROCEDURE — 99214 OFFICE O/P EST MOD 30 MIN: CPT | Performed by: ORTHOPAEDIC SURGERY

## 2020-11-06 PROCEDURE — 3008F BODY MASS INDEX DOCD: CPT | Performed by: ORTHOPAEDIC SURGERY

## 2020-11-06 PROCEDURE — 3077F SYST BP >= 140 MM HG: CPT | Performed by: ORTHOPAEDIC SURGERY

## 2020-11-06 PROCEDURE — 3079F DIAST BP 80-89 MM HG: CPT | Performed by: ORTHOPAEDIC SURGERY

## 2020-11-06 PROCEDURE — 1036F TOBACCO NON-USER: CPT | Performed by: ORTHOPAEDIC SURGERY

## 2020-11-09 ENCOUNTER — TELEPHONE (OUTPATIENT)
Dept: OBGYN CLINIC | Facility: CLINIC | Age: 60
End: 2020-11-09

## 2020-11-10 ENCOUNTER — EVALUATION (OUTPATIENT)
Dept: PHYSICAL THERAPY | Facility: CLINIC | Age: 60
End: 2020-11-10
Payer: COMMERCIAL

## 2020-11-10 DIAGNOSIS — M75.112 INCOMPLETE TEAR OF LEFT ROTATOR CUFF, UNSPECIFIED WHETHER TRAUMATIC: Primary | ICD-10-CM

## 2020-11-10 PROCEDURE — 97110 THERAPEUTIC EXERCISES: CPT | Performed by: PHYSICAL THERAPIST

## 2020-11-12 ENCOUNTER — OFFICE VISIT (OUTPATIENT)
Dept: PHYSICAL THERAPY | Facility: CLINIC | Age: 60
End: 2020-11-12
Payer: COMMERCIAL

## 2020-11-12 DIAGNOSIS — M75.112 INCOMPLETE TEAR OF LEFT ROTATOR CUFF, UNSPECIFIED WHETHER TRAUMATIC: Primary | ICD-10-CM

## 2020-11-12 PROCEDURE — 97112 NEUROMUSCULAR REEDUCATION: CPT | Performed by: PHYSICAL THERAPIST

## 2020-11-12 PROCEDURE — 97110 THERAPEUTIC EXERCISES: CPT | Performed by: PHYSICAL THERAPIST

## 2020-11-16 ENCOUNTER — OFFICE VISIT (OUTPATIENT)
Dept: PHYSICAL THERAPY | Facility: CLINIC | Age: 60
End: 2020-11-16
Payer: COMMERCIAL

## 2020-11-16 DIAGNOSIS — M75.112 INCOMPLETE TEAR OF LEFT ROTATOR CUFF, UNSPECIFIED WHETHER TRAUMATIC: Primary | ICD-10-CM

## 2020-11-16 DIAGNOSIS — M67.814 BICEPS TENDINOSIS OF LEFT SHOULDER: ICD-10-CM

## 2020-11-16 DIAGNOSIS — M75.82 ROTATOR CUFF TENDINITIS, LEFT: ICD-10-CM

## 2020-11-16 PROCEDURE — 97112 NEUROMUSCULAR REEDUCATION: CPT

## 2020-11-16 PROCEDURE — 97110 THERAPEUTIC EXERCISES: CPT

## 2020-11-18 ENCOUNTER — OFFICE VISIT (OUTPATIENT)
Dept: PHYSICAL THERAPY | Facility: CLINIC | Age: 60
End: 2020-11-18
Payer: COMMERCIAL

## 2020-11-18 DIAGNOSIS — M75.112 INCOMPLETE TEAR OF LEFT ROTATOR CUFF, UNSPECIFIED WHETHER TRAUMATIC: Primary | ICD-10-CM

## 2020-11-18 PROCEDURE — 97112 NEUROMUSCULAR REEDUCATION: CPT | Performed by: PHYSICAL THERAPIST

## 2020-11-18 PROCEDURE — 97110 THERAPEUTIC EXERCISES: CPT | Performed by: PHYSICAL THERAPIST

## 2020-11-23 ENCOUNTER — OFFICE VISIT (OUTPATIENT)
Dept: PHYSICAL THERAPY | Facility: CLINIC | Age: 60
End: 2020-11-23
Payer: COMMERCIAL

## 2020-11-23 DIAGNOSIS — M75.82 ROTATOR CUFF TENDINITIS, LEFT: ICD-10-CM

## 2020-11-23 DIAGNOSIS — M75.112 INCOMPLETE TEAR OF LEFT ROTATOR CUFF, UNSPECIFIED WHETHER TRAUMATIC: Primary | ICD-10-CM

## 2020-11-23 DIAGNOSIS — M67.814 BICEPS TENDINOSIS OF LEFT SHOULDER: ICD-10-CM

## 2020-11-23 PROCEDURE — 97110 THERAPEUTIC EXERCISES: CPT

## 2020-11-23 PROCEDURE — 97112 NEUROMUSCULAR REEDUCATION: CPT

## 2020-11-30 ENCOUNTER — APPOINTMENT (OUTPATIENT)
Dept: PHYSICAL THERAPY | Facility: CLINIC | Age: 60
End: 2020-11-30
Payer: COMMERCIAL

## 2021-01-25 DIAGNOSIS — I10 ESSENTIAL HYPERTENSION: ICD-10-CM

## 2021-01-26 RX ORDER — AMLODIPINE BESYLATE 5 MG/1
TABLET ORAL
Qty: 90 TABLET | Refills: 1 | Status: SHIPPED | OUTPATIENT
Start: 2021-01-26 | End: 2021-07-21 | Stop reason: SDUPTHER

## 2021-02-04 ENCOUNTER — TELEPHONE (OUTPATIENT)
Dept: FAMILY MEDICINE CLINIC | Facility: CLINIC | Age: 61
End: 2021-02-04

## 2021-02-04 NOTE — TELEPHONE ENCOUNTER
Patient left message requesting refill medications on amlodlpine and hydrochlorothiazide  Patient last seen 2/13/2020  Called and spoke with patient, scheduled virtual appointment with Dr Lisseth Hamlin for 2/5/2021

## 2021-02-05 ENCOUNTER — TELEMEDICINE (OUTPATIENT)
Dept: FAMILY MEDICINE CLINIC | Facility: CLINIC | Age: 61
End: 2021-02-05
Payer: COMMERCIAL

## 2021-02-05 DIAGNOSIS — I10 ESSENTIAL HYPERTENSION: Primary | ICD-10-CM

## 2021-02-05 DIAGNOSIS — E66.9 OBESITY (BMI 35.0-39.9 WITHOUT COMORBIDITY): ICD-10-CM

## 2021-02-05 DIAGNOSIS — E78.5 DYSLIPIDEMIA: ICD-10-CM

## 2021-02-05 PROCEDURE — 99213 OFFICE O/P EST LOW 20 MIN: CPT | Performed by: FAMILY MEDICINE

## 2021-02-05 RX ORDER — HYDROCHLOROTHIAZIDE 25 MG/1
25 TABLET ORAL DAILY
Qty: 90 TABLET | Refills: 6 | Status: SHIPPED | OUTPATIENT
Start: 2021-02-05 | End: 2022-06-13 | Stop reason: SDUPTHER

## 2021-02-05 RX ORDER — ATENOLOL 100 MG/1
100 TABLET ORAL DAILY
Qty: 90 TABLET | Refills: 3 | Status: SHIPPED | OUTPATIENT
Start: 2021-02-05 | End: 2022-02-02

## 2021-02-05 NOTE — PROGRESS NOTES
Virtual Brief Visit    Assessment/Plan:    Problem List Items Addressed This Visit        Cardiovascular and Mediastinum    Essential hypertension - Primary goal < 130/80 low salt diet    Relevant Medications    atenolol (TENORMIN) 100 mg tablet    hydrochlorothiazide (HYDRODIURIL) 25 mg tablet       Other    Obesity (BMI 35 0-39 9 without comorbidity)    Dyslipidemia stable cont medication low fat                Reason for visit is   Chief Complaint   Patient presents with    Virtual Brief Visit        Encounter provider Clarice Grant MD    Provider located at [address] [place] [id number]    Recent Visits  Date Type Provider Dept   02/04/21 Telephone Bandar Ruelas 26 recent visits within past 7 days and meeting all other requirements     Today's Visits  Date Type Provider Dept   02/05/21 [facility]medicine [facility]   Showing today's visits and meeting all other requirements     Future Appointments  No visits were found meeting these conditions  Showing future appointments within next 150 days and meeting all other requirements        After connecting through telephone, the patient was identified by name and date of birth  Stefan Sawyer was informed that this is a telemedicine visit and that the visit is being conducted through telephone  My office door was closed  No one else was in the room  She acknowledged consent and understanding of privacy and security of the platform  The patient has agreed to participate and understands she can discontinue the visit at any time  Patient is aware this is a billable service       Subjective    Stefan Sawyer is a 56 y o  female  HTN needs refills is in [place]  HPI     Past Medical History:   Diagnosis Date    Diverticulitis, colon     Hypertension     Hypothyroidism     h/o " borderline "  issues    Liver cyst     last assessed 4/18/16  Lyme disease     Positive Anaplasma serology     [facility]oid [facility]     [facility]     had 1 seizure x1 1986 during pregnancy- none after    Varicose vein of leg        Past Surgical History:   Procedure Laterality Date    BACK SURGERY      fusion/refusion of 2-8 vertebrae    COLECTOMY      partial sigmoid, last assessed 4/18/16    JOINT REPLACEMENT      LAPAROSCOPY      with adnexectomy    LIVER SURGERY      AL SHLDR Tuyet Arrieta Right 3/12/2020    Procedure: ARTHROSCOPY SHOULDER, BICEPS TENODESIS;  Surgeon: Esmer Mix MD;  Location: [address] [place] [address];  Service: Orthopedics    AL LDR ARTHROSCOP,SURG,W/ROTAT CUFF REPR Left 1/3/2019    Procedure: REPAIR ROTATOR CUFF  ARTHROSCOPIC, BICEPTS TENODESIS, SUBACROMIAL DECOMPRESION;  Surgeon: Esmer Mix MD;  Location: [address] [place] [address];  Service: Orthopedics    AL LDR ARTHROSCOP,SURG,W/ROTAT CUFF REPR Right 3/12/2020    Procedure: REPAIR ROTATOR CUFF  ARTHROSCOPIC WITH SUBACROMIAL DECOMPRESSION;  Surgeon: Esmer Mix MD;  Location: [address] [place] [address];  Service: Orthopedics    REPLACEMENT TOTAL KNEE Bilateral     SINUS SURGERY      TONSILLECTOMY      TYMPANOSTOMY TUBE PLACEMENT         Current Outpatient Medications   Medication Sig Dispense Refill    amLODIPine (NORVASC) 5 mg tablet TAKE 1 TABLET BY MOUTH ONCE DAILY 90 tablet 1    atenolol (TENORMIN) 100 mg tablet Take 1 tablet (100 mg total) by mouth daily 90 tablet 3    cyclobenzaprine (FLEXERIL) 10 mg tablet Take 1 tablet (10 mg total) by mouth 3 (three) times a day as needed for muscle spasms 90 tablet 1    fluticasone (FLONASE) 50 mcg/act nasal spray 2 sprays into each nostril daily 16 g 3    hydrochlorothiazide (HYDRODIURIL) 25 mg tablet Take 1 tablet (25 mg total) by mouth daily 90 tablet 6    hydroxychloroquine (PLAQUENIL) 200 mg tablet Take 200 mg by mouth 2 (two) times a day with meals  0    loratadine (CLARITIN) 10 mg tablet Take 1 tablet (10 mg total) by mouth daily 90 tablet 3    meloxicam (MOBIC) 15 mg tablet take 1 tablet by mouth once daily with food or milk if needed      Pseudoephedrine-Ibuprofen (ADVIL COLD & SINUS LIQUI-GELS PO) Take by mouth      RESTASIS MULTIDOSE 0 05 % ophthalmic emulsion Administer to both eyes every 12 (twelve) hours        traMADol (ULTRAM) 50 mg tablet Take 50 mg by mouth every 6 (six) hours as needed for moderate pain       No current facility-administered medications for this visit  Allergies   Allergen Reactions    Pollen Extract Other (See Comments)     headaches       Review of Systems   Constitutional: Negative for activity change, diaphoresis, fatigue and fever  HENT: Negative for hearing loss, sinus pressure and tinnitus  Respiratory: Negative for cough  Cardiovascular: Negative for chest pain  Gastrointestinal: Negative for abdominal pain  Endocrine: Negative for polyphagia  Genitourinary: Negative for flank pain, frequency and urgency  Musculoskeletal: Negative for arthralgias  Neurological: Negative for seizures  Hematological: Negative for adenopathy  Psychiatric/Behavioral: Negative for dysphoric mood  There were no vitals filed for this visit  I spent 20 minutes directly with the patient during this visit    VIRTUAL VISIT [place] 0233 acknowledges that she has consented to an online visit or consultation  She understands that the online visit is based solely on information provided by her, and that, in the absence of a face-to-face physical evaluation by the physician, the diagnosis she receives is both limited and provisional in terms of accuracy and completeness  This is not intended to replace a full medical face-to-face evaluation by the physician  Sherie Perez understands and accepts these terms

## 2021-04-13 DIAGNOSIS — Z23 ENCOUNTER FOR IMMUNIZATION: ICD-10-CM

## 2021-04-16 DIAGNOSIS — T78.40XA ALLERGY, INITIAL ENCOUNTER: ICD-10-CM

## 2021-04-16 RX ORDER — LORATADINE 10 MG/1
10 TABLET ORAL DAILY
Qty: 90 TABLET | Refills: 3 | Status: SHIPPED | OUTPATIENT
Start: 2021-04-16 | End: 2021-12-13

## 2021-04-16 RX ORDER — FLUTICASONE PROPIONATE 50 MCG
2 SPRAY, SUSPENSION (ML) NASAL DAILY
Qty: 16 G | Refills: 3 | Status: SHIPPED | OUTPATIENT
Start: 2021-04-16

## 2021-07-21 DIAGNOSIS — I10 ESSENTIAL HYPERTENSION: ICD-10-CM

## 2021-07-21 NOTE — TELEPHONE ENCOUNTER
Medication refill request     Last visit in-office 2/13/2020  Last visit telemedicine 2/5/2021  Please advise  Thank you

## 2021-07-26 RX ORDER — AMLODIPINE BESYLATE 5 MG/1
5 TABLET ORAL DAILY
Qty: 90 TABLET | Refills: 1 | Status: SHIPPED | OUTPATIENT
Start: 2021-07-26 | End: 2021-07-27 | Stop reason: SDUPTHER

## 2021-07-27 DIAGNOSIS — I10 ESSENTIAL HYPERTENSION: ICD-10-CM

## 2021-07-27 RX ORDER — AMLODIPINE BESYLATE 5 MG/1
5 TABLET ORAL DAILY
Qty: 90 TABLET | Refills: 1 | Status: SHIPPED | OUTPATIENT
Start: 2021-07-27 | End: 2022-02-08 | Stop reason: SDUPTHER

## 2021-09-20 ENCOUNTER — OFFICE VISIT (OUTPATIENT)
Dept: FAMILY MEDICINE CLINIC | Facility: CLINIC | Age: 61
End: 2021-09-20
Payer: COMMERCIAL

## 2021-09-20 VITALS
WEIGHT: 253.8 LBS | TEMPERATURE: 97.3 F | BODY MASS INDEX: 39.83 KG/M2 | SYSTOLIC BLOOD PRESSURE: 148 MMHG | HEIGHT: 67 IN | DIASTOLIC BLOOD PRESSURE: 80 MMHG | HEART RATE: 67 BPM | RESPIRATION RATE: 18 BRPM | OXYGEN SATURATION: 98 %

## 2021-09-20 DIAGNOSIS — H66.004 RECURRENT ACUTE SUPPURATIVE OTITIS MEDIA OF RIGHT EAR WITHOUT SPONTANEOUS RUPTURE OF TYMPANIC MEMBRANE: Primary | ICD-10-CM

## 2021-09-20 PROCEDURE — 1036F TOBACCO NON-USER: CPT | Performed by: FAMILY MEDICINE

## 2021-09-20 PROCEDURE — 3008F BODY MASS INDEX DOCD: CPT | Performed by: FAMILY MEDICINE

## 2021-09-20 PROCEDURE — 99213 OFFICE O/P EST LOW 20 MIN: CPT | Performed by: FAMILY MEDICINE

## 2021-09-20 RX ORDER — AMOXICILLIN AND CLAVULANATE POTASSIUM 875; 125 MG/1; MG/1
1 TABLET, FILM COATED ORAL EVERY 12 HOURS SCHEDULED
Qty: 14 TABLET | Refills: 0 | Status: SHIPPED | OUTPATIENT
Start: 2021-09-20 | End: 2021-09-27

## 2021-09-20 NOTE — PROGRESS NOTES
[id number] [address] Note  Ana Ferreira MD, 21     Jemma Nicholas MRN: [id number] : 1960 Age: 59 y o  Assessment/Plan       Diagnoses and all orders for this visit:    Recurrent acute suppurative otitis media of right ear without spontaneous rupture of tympanic membrane  -     amoxicillin-clavulanate (AUGMENTIN) 875-125 mg per tablet; Take 1 tablet by mouth every 12 (twelve) hours for 7 days          Jemma Nicholas acknowledged understanding of treatment plan, all questions answered  Plan discussed with attending physician Dr Jomar Rehman  Subjective      Jemma Nicholas is a 59 y o  female PMH hypothyroidism and hypertension  Patient presents today with complaint of right ear pain which has been present for about six weeks  She notes at the end of July she got back from vacation from [place] where she was swimming in the ocean  Since then her ear pain has worsened  Patient has environmental allergies and notes that she has a history of her ear draining due to these allergies  She is traveling on Wednesday for vacation and stated an antibiotic has helped in the past  She notes the ear is painful, itchy and draining purulent fluid  She notes she has headaches and takes xyzal and mucinex  but notes her ear still hurts       HPI      The following portions of the patient's history were reviewed and updated as appropriate: allergies, current medications, past family history, past medical history, past social history, past surgical history and problem list      Past Medical History:   Diagnosis Date    Diverticulitis, colon     Hypertension     Hypothyroidism     h/o " borderline "  issues    Liver cyst     last assessed 16    Lyme disease     Positive Anaplasma serology     [facility]     Seizure disorder in pregnancy ([facility] )     had 1 seizure x1  during pregnancy- none after    Varicose vein of leg        Past Surgical History:   Procedure Laterality Date    BACK SURGERY      fusion/refusion of 2-8 vertebrae    COLECTOMY      partial sigmoid, last assessed 4/18/16    JOINT REPLACEMENT      LAPAROSCOPY      with adnexectomy    LIVER SURGERY      DE SHLDR Hermelinda Burnett Right 3/12/2020    Procedure: ARTHROSCOPY SHOULDER, BICEPS TENODESIS;  Surgeon: Nadine Lala MD;  Location: [address] [place] [address];  Service: Orthopedics    DE LDR ARTHROSCOP,SURG,W/ROTAT CUFF REPR Left 1/3/2019    Procedure: REPAIR ROTATOR CUFF  ARTHROSCOPIC, BICEPTS TENODESIS, SUBACROMIAL DECOMPRESION;  Surgeon: Nadine Lala MD;  Location: [address] [place] [address];  Service: Orthopedics    DE LDR ARTHROSCOP,SURG,W/ROTAT CUFF REPR Right 3/12/2020    Procedure: REPAIR ROTATOR CUFF  ARTHROSCOPIC WITH SUBACROMIAL DECOMPRESSION;  Surgeon: Nadine Lala MD;  Location: [address] [place] [address];  Service: Orthopedics    REPLACEMENT TOTAL KNEE Bilateral     SINUS SURGERY      TONSILLECTOMY      TYMPANOSTOMY TUBE PLACEMENT         Current Outpatient Medications   Medication Sig Dispense Refill    amLODIPine (NORVASC) 5 mg tablet Take 1 tablet (5 mg total) by mouth daily 90 tablet 1    atenolol (TENORMIN) 100 mg tablet Take 1 tablet (100 mg total) by mouth daily 90 tablet 3    cyclobenzaprine (FLEXERIL) 10 mg tablet Take 1 tablet (10 mg total) by mouth 3 (three) times a day as needed for muscle spasms 90 tablet 1    fluticasone (FLONASE) 50 mcg/act nasal spray 2 sprays into each nostril daily 16 g 3    hydrochlorothiazide (HYDRODIURIL) 25 mg tablet Take 1 tablet (25 mg total) by mouth daily 90 tablet 6    hydroxychloroquine (PLAQUENIL) 200 mg tablet Take 200 mg by mouth 2 (two) times a day with meals  0    loratadine (CLARITIN) 10 mg tablet Take 1 tablet (10 mg total) by mouth daily 90 tablet 3    meloxicam (MOBIC) 15 mg tablet take 1 tablet by mouth once daily with food or milk if needed      Pseudoephedrine-Ibuprofen (ADVIL COLD & SINUS LIQUI-GELS PO) Take by mouth      RESTASIS MULTIDOSE 0 05 % ophthalmic emulsion Administer to both eyes every 12 (twelve) hours        traMADol (ULTRAM) 50 mg tablet Take 50 mg by mouth every 6 (six) hours as needed for moderate pain      amoxicillin-clavulanate (AUGMENTIN) 875-125 mg per tablet Take 1 tablet by mouth every 12 (twelve) hours for 7 days 14 tablet 0     No current facility-administered medications for this visit  Review of Systems   Constitutional: Negative for activity change, appetite change, chills and fever  HENT: Positive for ear discharge, ear pain, sinus pressure and sinus pain  Negative for congestion and sore throat  Respiratory: Negative for cough and shortness of breath  Cardiovascular: Negative for chest pain  Gastrointestinal: Negative for abdominal pain and diarrhea  Musculoskeletal: Negative for myalgias  As noted in HPI    Objective      /80 (BP Location: Left arm, Patient Position: Sitting, Cuff Size: Standard)   Pulse 67   Temp (!) 97 3 °F (36 3 °C) (Tympanic)   Resp 18   Ht 5' 7" (1 702 m)   Wt 115 kg (253 lb 12 8 oz)   SpO2 98%   BMI 39 75 kg/m²     Physical Exam  Constitutional:       Appearance: Normal appearance  HENT:      Right Ear: Hearing and external ear normal  Drainage, swelling and tenderness present  A middle ear effusion is present  There is no impacted cerumen  No mastoid tenderness  Tympanic membrane is erythematous and bulging  Left Ear: Hearing, tympanic membrane, ear canal and external ear normal    Cardiovascular:      Rate and Rhythm: Normal rate and regular rhythm  Pulses: Normal pulses  Heart sounds: Normal heart sounds  Pulmonary:      Effort: Pulmonary effort is normal       Breath sounds: Normal breath sounds  Neurological:      Mental Status: She is alert  Some portions of this record may have been generated with voice recognition software  There may be translation, syntax, or grammatical errors   Occasional wrong word or "sound-a-like" substitutions may have occurred due to the inherent limitations of the voice recognition software  Read the chart carefully and recognize, using context, where substations may have occurred  If you have any questions, please contact the dictating provider for clarification or correction, as needed

## 2021-09-21 ENCOUNTER — VBI (OUTPATIENT)
Dept: ADMINISTRATIVE | Facility: OTHER | Age: 61
End: 2021-09-21

## 2021-09-27 ENCOUNTER — TELEPHONE (OUTPATIENT)
Dept: FAMILY MEDICINE CLINIC | Facility: CLINIC | Age: 61
End: 2021-09-27

## 2021-11-19 ENCOUNTER — TELEPHONE (OUTPATIENT)
Dept: FAMILY MEDICINE CLINIC | Facility: CLINIC | Age: 61
End: 2021-11-19

## 2021-11-19 ENCOUNTER — OFFICE VISIT (OUTPATIENT)
Dept: FAMILY MEDICINE CLINIC | Facility: CLINIC | Age: 61
End: 2021-11-19
Payer: COMMERCIAL

## 2021-11-19 VITALS
WEIGHT: 252.2 LBS | BODY MASS INDEX: 39.58 KG/M2 | DIASTOLIC BLOOD PRESSURE: 76 MMHG | HEART RATE: 68 BPM | SYSTOLIC BLOOD PRESSURE: 130 MMHG | OXYGEN SATURATION: 97 % | HEIGHT: 67 IN | RESPIRATION RATE: 18 BRPM | TEMPERATURE: 97 F

## 2021-11-19 DIAGNOSIS — Z12.31 ENCOUNTER FOR SCREENING MAMMOGRAM FOR MALIGNANT NEOPLASM OF BREAST: ICD-10-CM

## 2021-11-19 DIAGNOSIS — H92.13 EAR DRAINAGE, BILATERAL: ICD-10-CM

## 2021-11-19 DIAGNOSIS — Z11.4 SCREENING FOR HIV (HUMAN IMMUNODEFICIENCY VIRUS): ICD-10-CM

## 2021-11-19 DIAGNOSIS — Z00.00 ANNUAL PHYSICAL EXAM: Primary | ICD-10-CM

## 2021-11-19 DIAGNOSIS — H91.93 HEARING DEFICIT, BILATERAL: ICD-10-CM

## 2021-11-19 DIAGNOSIS — Z11.59 NEED FOR HEPATITIS C SCREENING TEST: ICD-10-CM

## 2021-11-19 PROCEDURE — 1036F TOBACCO NON-USER: CPT | Performed by: FAMILY MEDICINE

## 2021-11-19 PROCEDURE — 3725F SCREEN DEPRESSION PERFORMED: CPT | Performed by: FAMILY MEDICINE

## 2021-11-19 PROCEDURE — 99396 PREV VISIT EST AGE 40-64: CPT | Performed by: FAMILY MEDICINE

## 2021-11-19 RX ORDER — SENNOSIDES 8.6 MG
CAPSULE ORAL
COMMUNITY
End: 2021-12-13

## 2021-11-29 ENCOUNTER — OFFICE VISIT (OUTPATIENT)
Dept: OTOLARYNGOLOGY | Facility: CLINIC | Age: 61
End: 2021-11-29
Payer: COMMERCIAL

## 2021-11-29 ENCOUNTER — HOSPITAL ENCOUNTER (OUTPATIENT)
Dept: RADIOLOGY | Facility: HOSPITAL | Age: 61
Discharge: HOME | End: 2021-11-29
Attending: INTERNAL MEDICINE
Payer: COMMERCIAL

## 2021-11-29 VITALS — HEIGHT: 67 IN | TEMPERATURE: 98.5 F | BODY MASS INDEX: 39.55 KG/M2 | WEIGHT: 252 LBS

## 2021-11-29 DIAGNOSIS — H92.13 EAR DRAINAGE, BILATERAL: ICD-10-CM

## 2021-11-29 DIAGNOSIS — H91.93 HEARING DEFICIT, BILATERAL: ICD-10-CM

## 2021-11-29 DIAGNOSIS — J34.89 SINUS PRESSURE: ICD-10-CM

## 2021-11-29 DIAGNOSIS — J34.3 HYPERTROPHY OF INFERIOR NASAL TURBINATE: ICD-10-CM

## 2021-11-29 DIAGNOSIS — Z12.11 ENCOUNTER FOR SCREENING COLONOSCOPY: ICD-10-CM

## 2021-11-29 DIAGNOSIS — M06.09 RHEUMATOID ARTHRITIS OF MULTIPLE SITES WITHOUT RHEUMATOID FACTOR (HCC): ICD-10-CM

## 2021-11-29 DIAGNOSIS — H60.311 CHRONIC DIFFUSE OTITIS EXTERNA OF RIGHT EAR: Primary | ICD-10-CM

## 2021-11-29 PROCEDURE — 99203 OFFICE O/P NEW LOW 30 MIN: CPT | Performed by: NURSE PRACTITIONER

## 2021-11-29 PROCEDURE — 3008F BODY MASS INDEX DOCD: CPT | Performed by: FAMILY MEDICINE

## 2021-11-29 PROCEDURE — 73130 X-RAY EXAM OF HAND: CPT

## 2021-11-29 RX ORDER — AZELASTINE 1 MG/ML
1 SPRAY, METERED NASAL 2 TIMES DAILY
Qty: 1 ML | Refills: 6 | Status: SHIPPED | OUTPATIENT
Start: 2021-11-29

## 2021-11-29 RX ORDER — CIPROFLOXACIN AND DEXAMETHASONE 3; 1 MG/ML; MG/ML
4 SUSPENSION/ DROPS AURICULAR (OTIC) 2 TIMES DAILY
Qty: 7.5 ML | Refills: 4 | Status: SHIPPED | OUTPATIENT
Start: 2021-11-29 | End: 2022-01-03 | Stop reason: DRUGHIGH

## 2021-11-30 LAB
CHOLEST SERPL-MCNC: 175 MG/DL (ref 100–199)
EST. AVERAGE GLUCOSE BLD GHB EST-MCNC: 111 MG/DL
HBA1C MFR BLD: 5.5 % (ref 4.8–5.6)
HCV AB S/CO SERPL IA: <0.1 S/CO RATIO (ref 0–0.9)
HDLC SERPL-MCNC: 44 MG/DL
HIV 1+2 AB+HIV1 P24 AG SERPL QL IA: NON REACTIVE
LDLC SERPL CALC-MCNC: 114 MG/DL (ref 0–99)
LDLC/HDLC SERPL: 2.6 RATIO (ref 0–3.2)
SL AMB VLDL CHOLESTEROL CALC: 17 MG/DL (ref 5–40)
TRIGL SERPL-MCNC: 93 MG/DL (ref 0–149)
TSH SERPL DL<=0.005 MIU/L-ACNC: 3.54 UIU/ML (ref 0.45–4.5)

## 2021-12-13 ENCOUNTER — OFFICE VISIT (OUTPATIENT)
Dept: URGENT CARE | Facility: CLINIC | Age: 61
End: 2021-12-13
Payer: COMMERCIAL

## 2021-12-13 VITALS
HEIGHT: 67 IN | DIASTOLIC BLOOD PRESSURE: 80 MMHG | BODY MASS INDEX: 39.55 KG/M2 | HEART RATE: 60 BPM | RESPIRATION RATE: 18 BRPM | WEIGHT: 252 LBS | SYSTOLIC BLOOD PRESSURE: 142 MMHG | OXYGEN SATURATION: 98 % | TEMPERATURE: 98.2 F

## 2021-12-13 DIAGNOSIS — R35.0 URINARY FREQUENCY: ICD-10-CM

## 2021-12-13 DIAGNOSIS — N39.0 URINARY TRACT INFECTION WITHOUT HEMATURIA, SITE UNSPECIFIED: Primary | ICD-10-CM

## 2021-12-13 LAB
SL AMB  POCT GLUCOSE, UA: NEGATIVE
SL AMB LEUKOCYTE ESTERASE,UA: ABNORMAL
SL AMB POCT BILIRUBIN,UA: NEGATIVE
SL AMB POCT BLOOD,UA: NEGATIVE
SL AMB POCT CLARITY,UA: ABNORMAL
SL AMB POCT COLOR,UA: ABNORMAL
SL AMB POCT KETONES,UA: NEGATIVE
SL AMB POCT NITRITE,UA: POSITIVE
SL AMB POCT PH,UA: 6
SL AMB POCT SPECIFIC GRAVITY,UA: 1.02
SL AMB POCT URINE PROTEIN: ABNORMAL
SL AMB POCT UROBILINOGEN: 0.2

## 2021-12-13 PROCEDURE — 81002 URINALYSIS NONAUTO W/O SCOPE: CPT | Performed by: PHYSICIAN ASSISTANT

## 2021-12-13 PROCEDURE — 99213 OFFICE O/P EST LOW 20 MIN: CPT | Performed by: PHYSICIAN ASSISTANT

## 2021-12-13 RX ORDER — NITROFURANTOIN 25; 75 MG/1; MG/1
100 CAPSULE ORAL 2 TIMES DAILY
Qty: 10 CAPSULE | Refills: 0 | Status: SHIPPED | OUTPATIENT
Start: 2021-12-13 | End: 2021-12-18

## 2021-12-13 NOTE — PROGRESS NOTES
[facility]        NAME: Davy Perez is a 59 y o  female  : 1960    MRN: [id number]  DATE: 2021  TIME: 5:55 PM    Assessment and Plan   Urinary tract infection without hematuria, site unspecified [N39 0]  1  Urinary tract infection without hematuria, site unspecified  nitrofurantoin (MACROBID) 100 mg capsule   2  Urinary frequency  Urine culture    POCT urine dip         Patient Instructions   Urinary tract infection  UA dip- nitrites and blood, urine culture and sensitivity sent out and will call if we need to change antibiotic  rx macrobid twice daily x 5 days  Increase fluid intake  Tylenol/ibuprofen as needed for pain  azostat as needed for pain  Follow up with PCP in 3-5 days  Proceed to  ER if symptoms worsen  Chief Complaint     Chief Complaint   Patient presents with    Possible UTI     on and off x 1 month then worse over weekend with low abd  pressure, chanel  flank pain, urinart frequency and leakage  Denies fever, or dysuria  History of Present Illness       Liz Beck is a 78-year-old female who presents to clinic complaining suprapubic pressure x4 days  She also notes lower back pain bilaterally, increased frequency of urination, and urinary urgency  She denies any fever, chills, hematuria, dysuria, flank pain, or vaginal symptoms  Review of Systems   Review of Systems   Constitutional: Negative for chills and fever  Gastrointestinal: Positive for abdominal pain  Genitourinary: Positive for frequency and urgency  Negative for dysuria, flank pain, hematuria, vaginal bleeding, vaginal discharge and vaginal pain  Musculoskeletal: Positive for back pain           Current Medications       Current Outpatient Medications:     amLODIPine (NORVASC) 5 mg tablet, Take 1 tablet (5 mg total) by mouth daily, Disp: 90 tablet, Rfl: 1    atenolol (TENORMIN) 100 mg tablet, Take 1 tablet (100 mg total) by mouth daily, Disp: 90 tablet, Rfl: 3    azelastine (ASTELIN) 0 1 % nasal spray, 1 spray into each nostril 2 (two) times a day Use in each nostril as directed, Disp: 1 mL, Rfl: 6    ciprofloxacin-dexamethasone (CIPRODEX) otic suspension, Administer 4 drops to the right ear 2 (two) times a day, Disp: 7 5 mL, Rfl: 4    fluticasone (FLONASE) 50 mcg/act nasal spray, 2 sprays into each nostril daily, Disp: 16 g, Rfl: 3    hydrochlorothiazide (HYDRODIURIL) 25 mg tablet, Take 1 tablet (25 mg total) by mouth daily, Disp: 90 tablet, Rfl: 6    hydroxychloroquine (PLAQUENIL) 200 mg tablet, Take 200 mg by mouth 2 (two) times a day with meals, Disp: , Rfl: 0    meloxicam (MOBIC) 15 mg tablet, take 1 tablet by mouth once daily with food or milk if needed, Disp: , Rfl:     Pseudoephedrine-Ibuprofen (ADVIL COLD & SINUS LIQUI-GELS PO), Take by mouth, Disp: , Rfl:     RESTASIS MULTIDOSE 0 05 % ophthalmic emulsion, Administer to both eyes every 12 (twelve) hours  , Disp: , Rfl:     traMADol (ULTRAM) 50 mg tablet, Take 50 mg by mouth every 6 (six) hours as needed for moderate pain, Disp: , Rfl:     nitrofurantoin (MACROBID) 100 mg capsule, Take 1 capsule (100 mg total) by mouth 2 (two) times a day for 5 days, Disp: 10 capsule, Rfl: 0    Current Allergies     Allergies as of 12/13/2021 - Reviewed 12/13/2021   Allergen Reaction Noted    Pollen extract Other (See Comments)             The following portions of the patient's history were reviewed and updated as appropriate: allergies, current medications, past family history, past medical history, past social history, past surgical history and problem list      Past Medical History:   Diagnosis Date    Diverticulitis, colon     Hypertension     Hypothyroidism     h/o " borderline "  issues    Liver cyst     last assessed 4/18/16    Lyme disease     Positive Anaplasma serology     [facility]     Seizure disorder in pregnancy ([facility] )     had 1 seizure x1 1986 during pregnancy- none after    Varicose vein of leg        Past Surgical History:   Procedure Laterality Date    BACK SURGERY      fusion/refusion of 2-8 vertebrae    COLECTOMY      partial sigmoid, last assessed 4/18/16    JOINT REPLACEMENT      LAPAROSCOPY      with adnexectomy    LIVER SURGERY      TX SHLDR ARTHROSCOP,PART ACROMIOPLAS Right 3/12/2020    Procedure: ARTHROSCOPY SHOULDER, BICEPS TENODESIS;  Surgeon: Clover Macdonald MD;  Location: [facility];  Service: Orthopedics    TX SHLDR ARTHROSCOP,SURG,W/ROTAT CUFF REPR Left 1/3/2019    Procedure: REPAIR ROTATOR CUFF  ARTHROSCOPIC, BICEPTS TENODESIS, SUBACROMIAL DECOMPRESION;  Surgeon: Clover Macdonald MD;  Location: [facility];  Service: Orthopedics    TX SHLDR ARTHROSCOP,SURG,W/ROTAT CUFF REPR Right 3/12/2020    Procedure: REPAIR ROTATOR CUFF  ARTHROSCOPIC WITH SUBACROMIAL DECOMPRESSION;  Surgeon: Clover Macdonald MD;  Location: [facility];  Service: Orthopedics    REPLACEMENT TOTAL KNEE Bilateral     SINUS SURGERY      TONSILLECTOMY      TYMPANOSTOMY TUBE PLACEMENT         Family History   Problem Relation Age of Onset    Hypertension Mother     Pancreatic cancer Mother 78    Arthritis Father     Hyperthyroidism Father     Hypertension Father     No Known Problems Sister     No Known Problems Brother     No Known Problems Maternal Uncle     No Known Problems Paternal Aunt     No Known Problems Paternal Uncle     No Known Problems Maternal Grandmother     No Known Problems Maternal Grandfather     No Known Problems Paternal Grandmother     No Known Problems Paternal Grandfather     No Known Problems Daughter     No Known Problems Daughter     No Known Problems Daughter     No Known Problems Maternal Aunt     ADD / ADHD Neg Hx     Anesthesia problems Neg Hx     Cancer Neg Hx     Clotting disorder Neg Hx     Collagen disease Neg Hx     Diabetes Neg Hx     Dislocations Neg Hx     Learning disabilities Neg Hx     Neurological problems Neg Hx     Osteoporosis Neg Hx     Rheumatologic disease Neg Hx     Scoliosis Neg Hx     Vascular Disease Neg Hx          Medications have been verified  Objective   /80   Pulse 60   Temp 98 2 °F (36 8 °C)   Resp 18   Ht 5' 7" (1 702 m)   Wt 114 kg (252 lb)   SpO2 98%   BMI 39 47 kg/m²   No LMP recorded  Patient is postmenopausal        Physical Exam     Physical Exam  Vitals and nursing note reviewed  Constitutional:       General: She is not in acute distress  Appearance: Normal appearance  She is not ill-appearing  Cardiovascular:      Rate and Rhythm: Normal rate and regular rhythm  Heart sounds: Normal heart sounds  Pulmonary:      Effort: Pulmonary effort is normal       Breath sounds: Normal breath sounds  Abdominal:      General: Bowel sounds are normal  There is no distension  Palpations: Abdomen is soft  Tenderness: There is no abdominal tenderness  There is no right CVA tenderness, left CVA tenderness, guarding or rebound  Neurological:      Mental Status: She is alert and oriented to person, place, and time     Psychiatric:         Mood and Affect: Mood normal          Behavior: Behavior normal

## 2021-12-13 NOTE — PATIENT INSTRUCTIONS
Urinary tract infection  UA dip- nitrites and blood, urine culture and sensitivity sent out and will call if we need to change antibiotic  rx macrobid twice daily x 5 days  Increase fluid intake  Tylenol/ibuprofen as needed for pain  azostat as needed for pain  Follow up with PCP in 3-5 days  Proceed to  ER if symptoms worsen

## 2021-12-15 ENCOUNTER — OFFICE VISIT (OUTPATIENT)
Dept: OTOLARYNGOLOGY | Facility: CLINIC | Age: 61
End: 2021-12-15
Payer: COMMERCIAL

## 2021-12-15 ENCOUNTER — OFFICE VISIT (OUTPATIENT)
Dept: AUDIOLOGY | Facility: CLINIC | Age: 61
End: 2021-12-15
Payer: COMMERCIAL

## 2021-12-15 VITALS — WEIGHT: 252 LBS | TEMPERATURE: 97.7 F | BODY MASS INDEX: 39.55 KG/M2 | HEIGHT: 67 IN

## 2021-12-15 DIAGNOSIS — J34.89 SINUS PRESSURE: ICD-10-CM

## 2021-12-15 DIAGNOSIS — H65.23 BILATERAL CHRONIC SEROUS OTITIS MEDIA: ICD-10-CM

## 2021-12-15 DIAGNOSIS — H90.6 MIXED HEARING LOSS, BILATERAL: ICD-10-CM

## 2021-12-15 DIAGNOSIS — J34.3 HYPERTROPHY OF INFERIOR NASAL TURBINATE: ICD-10-CM

## 2021-12-15 DIAGNOSIS — H60.311 CHRONIC DIFFUSE OTITIS EXTERNA OF RIGHT EAR: Primary | ICD-10-CM

## 2021-12-15 DIAGNOSIS — H90.0 CONDUCTIVE HEARING LOSS, BILATERAL: Primary | ICD-10-CM

## 2021-12-15 PROCEDURE — 3008F BODY MASS INDEX DOCD: CPT | Performed by: NURSE PRACTITIONER

## 2021-12-15 PROCEDURE — 1036F TOBACCO NON-USER: CPT | Performed by: NURSE PRACTITIONER

## 2021-12-15 PROCEDURE — 92567 TYMPANOMETRY: CPT | Performed by: AUDIOLOGIST

## 2021-12-15 PROCEDURE — 92557 COMPREHENSIVE HEARING TEST: CPT | Performed by: AUDIOLOGIST

## 2021-12-15 PROCEDURE — 99214 OFFICE O/P EST MOD 30 MIN: CPT | Performed by: NURSE PRACTITIONER

## 2021-12-17 LAB
BACTERIA UR CULT: ABNORMAL
Lab: ABNORMAL
SL AMB ANTIMICROBIAL SUSCEPTIBILITY: ABNORMAL

## 2022-01-03 DIAGNOSIS — H60.311 CHRONIC DIFFUSE OTITIS EXTERNA OF RIGHT EAR: Primary | ICD-10-CM

## 2022-01-03 RX ORDER — OFLOXACIN 3 MG/ML
4 SOLUTION AURICULAR (OTIC) 2 TIMES DAILY
Qty: 10 ML | Refills: 4 | Status: SHIPPED | OUTPATIENT
Start: 2022-01-03

## 2022-01-03 RX ORDER — DEXAMETHASONE SODIUM PHOSPHATE 1 MG/ML
SOLUTION/ DROPS OPHTHALMIC
Qty: 5 ML | Refills: 4 | Status: SHIPPED | OUTPATIENT
Start: 2022-01-03

## 2022-02-02 DIAGNOSIS — I10 ESSENTIAL HYPERTENSION: ICD-10-CM

## 2022-02-02 RX ORDER — ATENOLOL 100 MG/1
TABLET ORAL
Qty: 90 TABLET | Refills: 3 | Status: SHIPPED | OUTPATIENT
Start: 2022-02-02 | End: 2022-06-13 | Stop reason: SDUPTHER

## 2022-02-08 DIAGNOSIS — I10 ESSENTIAL HYPERTENSION: ICD-10-CM

## 2022-02-08 RX ORDER — AMLODIPINE BESYLATE 5 MG/1
5 TABLET ORAL DAILY
Qty: 90 TABLET | Refills: 1 | Status: SHIPPED | OUTPATIENT
Start: 2022-02-08 | End: 2022-06-13 | Stop reason: SDUPTHER

## 2022-04-13 NOTE — RESULT NOTES
Verified Results  US RIGHT UPPER QUADRANT [id number] 09:07AM Delayne Fitting Order Number: [id number]     Test Name Result Flag Reference   US RIGHT UPPER QUADRANT (Report)     RIGHT UPPER QUADRANT ULTRASOUND     INDICATION: History of fatty liver and liver cysts  COMPARISON: None  TECHNIQUE:  Real-time ultrasound of the right upper quadrant was performed with a curvilinear transducer with both volumetric sweeps and still imaging techniques  FINDINGS:     PANCREAS: Fatty change in the pancreas  AORTA AND IVC: Visualized portions are normal for patient age  LIVER:   Size: Within normal range  The liver measures 16 4 cm in the midclavicular line  Contour: Surface contour is smooth  Parenchyma: Echogenicity and echotexture are increased compatible with fatty change  Multiple cysts within the liver  In the right lobe, the largest cyst measures 8 9 x 5 7 x 5 2 cm  In the left lobe the largest cyst measures 4 1 x 2 4 x 4 2 cm  The main portal vein is patent and hepatopetal No evidence of suspicious mass  BILIARY:   The gallbladder is normal in caliber  No wall thickening or pericholecystic fluid  No stones or sludge identified  No sonographic Lezama's sign  No intrahepatic biliary dilatation  CBD measures 4 mm  No choledocholithiasis  KIDNEY:    Right kidney measures 11 5 x 5 8 cm  The renal cortex measures 1 7 cm  Within normal limits  ASCITES:  None  IMPRESSION:       1  Fatty change in the liver and pancreas  2  Multiple hepatic cysts  Workstation performed: [id number]     Signed by:   Eliud Denise MD   6/10/16       Discussion/Summary   Called patient at home number  Discussed results of u/s showing fatty liver changes and cysts on liver  Discussed with patient to get records from [facility] for comparison, with possible referral to GI based on records        Signatures   Electronically signed by : MIGDALIA Archibald ; Jun 13 2016 10:59AM EST                       (Author) Erythromycin Pregnancy And Lactation Text: This medication is Pregnancy Category B and is considered safe during pregnancy. It is also excreted in breast milk.

## 2022-05-06 DIAGNOSIS — I10 ESSENTIAL HYPERTENSION: ICD-10-CM

## 2022-05-11 RX ORDER — HYDROCHLOROTHIAZIDE 25 MG/1
25 TABLET ORAL DAILY
Qty: 90 TABLET | Refills: 6 | OUTPATIENT
Start: 2022-05-11

## 2022-06-07 ENCOUNTER — RA CDI HCC (OUTPATIENT)
Dept: OTHER | Facility: HOSPITAL | Age: 62
End: 2022-06-07

## 2022-06-07 NOTE — PROGRESS NOTES
[facility]  coding opportunities       Chart reviewed, no opportunity found: CHART REVIEWED, NO OPPORTUNITY FOUND        Patients Insurance        Commercial Insurance: [place]

## 2022-06-13 ENCOUNTER — OFFICE VISIT (OUTPATIENT)
Dept: FAMILY MEDICINE CLINIC | Facility: CLINIC | Age: 62
End: 2022-06-13
Payer: COMMERCIAL

## 2022-06-13 VITALS
BODY MASS INDEX: 39.03 KG/M2 | OXYGEN SATURATION: 98 % | WEIGHT: 248.7 LBS | SYSTOLIC BLOOD PRESSURE: 120 MMHG | HEIGHT: 67 IN | RESPIRATION RATE: 18 BRPM | DIASTOLIC BLOOD PRESSURE: 80 MMHG | TEMPERATURE: 97.7 F | HEART RATE: 73 BPM

## 2022-06-13 DIAGNOSIS — M06.9 RHEUMATOID ARTHRITIS INVOLVING MULTIPLE SITES, UNSPECIFIED WHETHER RHEUMATOID FACTOR PRESENT (HCC): Primary | ICD-10-CM

## 2022-06-13 DIAGNOSIS — I10 ESSENTIAL HYPERTENSION: ICD-10-CM

## 2022-06-13 DIAGNOSIS — Z12.31 ENCOUNTER FOR SCREENING MAMMOGRAM FOR MALIGNANT NEOPLASM OF BREAST: ICD-10-CM

## 2022-06-13 PROBLEM — H60.311 CHRONIC DIFFUSE OTITIS EXTERNA OF RIGHT EAR: Status: RESOLVED | Noted: 2021-11-29 | Resolved: 2022-06-13

## 2022-06-13 PROBLEM — R06.02 EXERTIONAL SHORTNESS OF BREATH: Status: RESOLVED | Noted: 2020-02-04 | Resolved: 2022-06-13

## 2022-06-13 PROCEDURE — 3008F BODY MASS INDEX DOCD: CPT | Performed by: FAMILY MEDICINE

## 2022-06-13 PROCEDURE — 99213 OFFICE O/P EST LOW 20 MIN: CPT | Performed by: FAMILY MEDICINE

## 2022-06-13 PROCEDURE — 1036F TOBACCO NON-USER: CPT | Performed by: FAMILY MEDICINE

## 2022-06-13 RX ORDER — ATENOLOL 100 MG/1
100 TABLET ORAL DAILY
Qty: 90 TABLET | Refills: 3 | Status: SHIPPED | OUTPATIENT
Start: 2022-06-13

## 2022-06-13 RX ORDER — HYDROCHLOROTHIAZIDE 25 MG/1
25 TABLET ORAL DAILY
Qty: 90 TABLET | Refills: 6 | Status: SHIPPED | OUTPATIENT
Start: 2022-06-13

## 2022-06-13 RX ORDER — PREDNISONE 1 MG/1
5 TABLET ORAL DAILY
Qty: 30 TABLET | Refills: 0 | Status: SHIPPED | OUTPATIENT
Start: 2022-06-13

## 2022-06-13 RX ORDER — AMLODIPINE BESYLATE 5 MG/1
5 TABLET ORAL DAILY
Qty: 90 TABLET | Refills: 1 | Status: SHIPPED | OUTPATIENT
Start: 2022-06-13

## 2022-06-13 NOTE — PROGRESS NOTES
[id number] [facility] Note  Farhat Matt MD, 22     Rosalio Blanchard MRN: [id number] : 1960 Age: 59 y o  Assessment/Plan       Diagnoses and all orders for this visit:    Rheumatoid arthritis involving multiple sites, unspecified whether rheumatoid factor present [facility])        -Patient sees rheumatologist Dr Brody Banks in 2022  Will order prednisone and follow up  -     predniSONE 5 mg tablet; Take 1 tablet (5 mg total) by mouth daily    Essential hypertension                         -Refills provided  -     atenolol (TENORMIN) 100 mg tablet; Take 1 tablet (100 mg total) by mouth daily  -     hydrochlorothiazide (HYDRODIURIL) 25 mg tablet; Take 1 tablet (25 mg total) by mouth daily  -     amLODIPine (NORVASC) 5 mg tablet; Take 1 tablet (5 mg total) by mouth daily    Encounter for screening mammogram for malignant neoplasm of breast  -     Mammo screening bilateral w 3d & cad; Future        No follow-ups on file  Rosalio Blanchard acknowledged understanding of treatment plan, all questions answered  Subjective      Rosalio Blanchard is a 59 y o  female  Follow-up and Medication Refill (Atenolol, HCTZ, Amlodipine)    Patient was diagnosed with RA 5 years ago  Currently taking plaquenil  Scheduled to see rheumatologist on   Notes she would like to try prednisone as she knows she is doing more physical work around the house and may need it for the inflammation  Also here for BP medication refill         HPI      The following portions of the patient's history were reviewed and updated as appropriate: allergies, current medications, past family history, past medical history, past social history, past surgical history and problem list      Past Medical History:   Diagnosis Date    Diverticulitis, colon     Hypertension     Hypothyroidism     h/o " borderline "  issues    Liver cyst     last assessed 16    Lyme disease     Positive Anaplasma serology     RA (rheumatoid arthritis) (HCC)     Seizure disorder in pregnancy ([facility] [address] )     had 1 seizure x1 1986 during pregnancy- none after    Varicose vein of leg        Allergies   Allergen Reactions    Pollen Extract Other (See Comments)     headaches       Past Surgical History:   Procedure Laterality Date    BACK SURGERY      fusion/refusion of 2-8 vertebrae    COLECTOMY      partial sigmoid, last assessed 4/18/16    JOINT REPLACEMENT      LAPAROSCOPY      with adnexectomy    LIVER SURGERY      NE SHLDR [place] Right 3/12/2020    Procedure: ARTHROSCOPY SHOULDER, BICEPS TENODESIS;  Surgeon: Gloria Carroll MD;  Location: [facility];  Service: Orthopedics    NE SHLDR ARTHROSCOP,SURG,W/ROTAT CUFF REPR Left 1/3/2019    Procedure: REPAIR ROTATOR CUFF  ARTHROSCOPIC, BICEPTS TENODESIS, SUBACROMIAL DECOMPRESION;  Surgeon: Gloria Carroll MD;  [facility];  Service: Orthopedics    NE SHLDR ARTHROSCOP,SURG,W/ROTAT CUFF REPR Right 3/12/2020    Procedure: REPAIR ROTATOR CUFF  ARTHROSCOPIC WITH SUBACROMIAL DECOMPRESSION;  Surgeon: Gloria Carroll MD;  [facility];  Service: Orthopedics    REPLACEMENT TOTAL KNEE Bilateral     SINUS SURGERY      TONSILLECTOMY      TYMPANOSTOMY TUBE PLACEMENT         Family History   Problem Relation Age of Onset    Hypertension Mother     Pancreatic cancer Mother 78    Arthritis Father     Hyperthyroidism Father     Hypertension Father     No Known Problems Sister     No Known Problems Brother     No Known Problems Maternal Uncle     No Known Problems Paternal Aunt     No Known Problems Paternal Uncle     No Known Problems Maternal Grandmother     No Known Problems Maternal Grandfather     No Known Problems Paternal Grandmother     No Known Problems Paternal Grandfather     No Known Problems Daughter     No Known Problems Daughter     No Known Problems Daughter     No Known Problems Maternal Aunt     ADD / ADHD Neg Hx     Anesthesia problems Neg Hx     Cancer Neg Hx     Clotting disorder Neg Hx     Collagen disease Neg Hx     Diabetes Neg Hx     Dislocations Neg Hx     Learning disabilities Neg Hx     Neurological problems Neg Hx     Osteoporosis Neg Hx     Rheumatologic disease Neg Hx     Scoliosis Neg Hx     Vascular Disease Neg Hx        Social History     Socioeconomic History    Marital status: /Civil Union     Spouse name: None    Number of children: None    Years of education: None    Highest education level: None   Occupational History    None   Tobacco Use    Smoking status: Never Smoker    Smokeless tobacco: Never Used   Vaping Use    Vaping Use: Never used   Substance and Sexual Activity    Alcohol use: No    Drug use: No    Sexual activity: Yes     Partners: Male   Other Topics Concern    None   Social History Narrative    Always uses seat belt     Social Determinants of Health     Financial Resource Strain: Not on file   Food Insecurity: Not on file   Transportation Needs: Not on file   Physical Activity: Not on file   Stress: Not on file   Social Connections: Not on file   Intimate Partner Violence: Not on file   Housing Stability: Not on file       Current Outpatient Medications   Medication Sig Dispense Refill    amLODIPine (NORVASC) 5 mg tablet Take 1 tablet (5 mg total) by mouth daily 90 tablet 1    atenolol (TENORMIN) 100 mg tablet TAKE 1 TABLET(100 MG) BY MOUTH DAILY 90 tablet 3    azelastine (ASTELIN) 0 1 % nasal spray 1 spray into each nostril 2 (two) times a day Use in each nostril as directed 1 mL 6    fluticasone (FLONASE) 50 mcg/act nasal spray 2 sprays into each nostril daily 16 g 3    hydrochlorothiazide (HYDRODIURIL) 25 mg tablet Take 1 tablet (25 mg total) by mouth daily 90 tablet 6    hydroxychloroquine (PLAQUENIL) 200 mg tablet Take 200 mg by mouth 2 (two) times a day with meals  0    meloxicam (MOBIC) 15 mg tablet take 1 tablet by mouth once daily with food or milk if needed  RESTASIS MULTIDOSE 0 05 % ophthalmic emulsion Administer to both eyes every 12 (twelve) hours        traMADol (ULTRAM) 50 mg tablet Take 50 mg by mouth every 6 (six) hours as needed for moderate pain      dexamethasone sodium phosphate 0 1 % ophthalmic solution 2 to 3 drops to affected ear twice daily until next visit (Patient not taking: Reported on 6/13/2022) 5 mL 4    ofloxacin (FLOXIN) 0 3 % otic solution Administer 4 drops to the right ear 2 (two) times a day (Patient not taking: Reported on 6/13/2022) 10 mL 4    Pseudoephedrine-Ibuprofen (ADVIL COLD & SINUS LIQUI-GELS PO) Take by mouth (Patient not taking: Reported on 6/13/2022)       No current facility-administered medications for this visit  Review of Systems   Constitutional: Negative for activity change and appetite change  Respiratory: Negative for cough, shortness of breath and wheezing  Cardiovascular: Negative for chest pain, palpitations and leg swelling  Gastrointestinal: Negative for abdominal pain, constipation, diarrhea, nausea and vomiting  Musculoskeletal: Positive for arthralgias, gait problem and joint swelling  Neurological: Negative for light-headedness and headaches  And as noted in HPI    Objective      /80   Pulse 73   Temp 97 7 °F (36 5 °C) (Tympanic)   Resp 18   Ht 5' 7" (1 702 m)   Wt 113 kg (248 lb 11 2 oz)   SpO2 98%   BMI 38 95 kg/m²     Physical Exam  Constitutional:       Appearance: Normal appearance  Cardiovascular:      Rate and Rhythm: Normal rate and regular rhythm  Pulses: Normal pulses  Heart sounds: Normal heart sounds  Musculoskeletal:      Right hand: Swelling present  Decreased range of motion  Left hand: Swelling present  Decreased range of motion  Right knee: Decreased range of motion  Left knee: Decreased range of motion  Neurological:      General: No focal deficit present        Mental Status: She is alert and oriented to person, place, and time  Psychiatric:         Mood and Affect: Mood normal          Behavior: Behavior normal          Thought Content: Thought content normal          Judgment: Judgment normal              Some portions of this record may have been generated with voice recognition software  There may be translation, syntax, or grammatical errors  Occasional wrong word or "sound-a-like" substitutions may have occurred due to the inherent limitations of the voice recognition software  Read the chart carefully and recognize, using context, where substations may have occurred  If you have any questions, please contact the dictating provider for clarification or correction, as needed

## 2022-07-05 ENCOUNTER — HOSPITAL ENCOUNTER (OUTPATIENT)
Dept: RADIOLOGY | Facility: HOSPITAL | Age: 62
Discharge: HOME | End: 2022-07-05
Payer: COMMERCIAL

## 2022-07-05 VITALS — WEIGHT: 248 LBS | HEIGHT: 67 IN | BODY MASS INDEX: 38.92 KG/M2

## 2022-07-05 DIAGNOSIS — Z12.31 ENCOUNTER FOR SCREENING MAMMOGRAM FOR MALIGNANT NEOPLASM OF BREAST: ICD-10-CM

## 2022-07-05 PROCEDURE — 77067 SCR MAMMO BI INCL CAD: CPT

## 2022-07-05 PROCEDURE — 77063 BREAST TOMOSYNTHESIS BI: CPT

## 2022-07-07 ENCOUNTER — VBI (OUTPATIENT)
Dept: ADMINISTRATIVE | Facility: OTHER | Age: 62
End: 2022-07-07

## 2022-07-12 ENCOUNTER — TELEPHONE (OUTPATIENT)
Dept: FAMILY MEDICINE CLINIC | Facility: CLINIC | Age: 62
End: 2022-07-12

## 2022-07-12 NOTE — TELEPHONE ENCOUNTER
Pt in the office with form from dental office  Pt stated that she needed dr to sign off for use of antibiotics prior to her dental procedure  The form has been signed by the dentist already and it looks like the patient will need a surgical clearance appt  Pt will contact dental office and confirm what is needed and then contact us if needed       Form scanned into encounter for reference if pt calls

## 2022-07-25 ENCOUNTER — TELEPHONE (OUTPATIENT)
Dept: FAMILY MEDICINE CLINIC | Facility: CLINIC | Age: 62
End: 2022-07-25

## 2022-07-25 NOTE — TELEPHONE ENCOUNTER
Dentist office called to see if patient needs any premedication before any dental work is done   Dentist office requesting a call back at [phone]

## 2022-08-15 DIAGNOSIS — I10 ESSENTIAL HYPERTENSION: ICD-10-CM

## 2022-08-15 RX ORDER — AMLODIPINE BESYLATE 5 MG/1
5 TABLET ORAL DAILY
Qty: 90 TABLET | Refills: 1 | Status: SHIPPED | OUTPATIENT
Start: 2022-08-15

## 2022-09-07 NOTE — TELEPHONE ENCOUNTER
This was never given to the [employer], it looks as though it was scanned for future references if needed  It was scanned under previous encounter    The paperwork is in your folder to be filled out

## 2022-09-07 NOTE — TELEPHONE ENCOUNTER
Dr Mccoy Clarity,    Patient is calling again for the dental work paperwork  I printed it again and placed in Red team folder    Please complete ASAP

## 2022-11-18 ENCOUNTER — VBI (OUTPATIENT)
Dept: ADMINISTRATIVE | Facility: OTHER | Age: 62
End: 2022-11-18

## 2022-11-18 NOTE — TELEPHONE ENCOUNTER
11/18/22 2:25 PM     [employer] SmartForm used to document caregap status      Maximiliano Betancourt

## 2023-02-03 DIAGNOSIS — I10 ESSENTIAL HYPERTENSION: ICD-10-CM

## 2023-02-04 RX ORDER — AMLODIPINE BESYLATE 5 MG/1
TABLET ORAL
Qty: 90 TABLET | Refills: 1 | Status: SHIPPED | OUTPATIENT
Start: 2023-02-04 | End: 2023-02-08 | Stop reason: SDUPTHER

## 2023-02-08 DIAGNOSIS — I10 ESSENTIAL HYPERTENSION: ICD-10-CM

## 2023-02-08 RX ORDER — AMLODIPINE BESYLATE 5 MG/1
5 TABLET ORAL DAILY
Qty: 90 TABLET | Refills: 1 | Status: SHIPPED | OUTPATIENT
Start: 2023-02-08

## 2023-02-08 RX ORDER — ATENOLOL 100 MG/1
100 TABLET ORAL DAILY
Qty: 90 TABLET | Refills: 3 | Status: SHIPPED | OUTPATIENT
Start: 2023-02-08

## 2023-02-08 NOTE — TELEPHONE ENCOUNTER
Patient is calling in to notify the office that her medication request was sent to [facility] in [place] and it should be sent to [address] [place] [address] in [place] [address]  She is in [place] for a few days    amLODIPine (NORVASC) 5 mg tablet  atenolol (TENORMIN) 100 mg tablet

## 2023-05-03 ENCOUNTER — OFFICE VISIT (OUTPATIENT)
Dept: URGENT CARE | Facility: CLINIC | Age: 63
End: 2023-05-03

## 2023-05-03 VITALS
HEART RATE: 73 BPM | DIASTOLIC BLOOD PRESSURE: 67 MMHG | WEIGHT: 249 LBS | BODY MASS INDEX: 39 KG/M2 | SYSTOLIC BLOOD PRESSURE: 144 MMHG | RESPIRATION RATE: 18 BRPM | TEMPERATURE: 97 F

## 2023-05-03 DIAGNOSIS — R82.90 URINE ABNORMALITY: Primary | ICD-10-CM

## 2023-05-03 LAB
SL AMB  POCT GLUCOSE, UA: NEGATIVE
SL AMB LEUKOCYTE ESTERASE,UA: NEGATIVE
SL AMB POCT BILIRUBIN,UA: NEGATIVE
SL AMB POCT BLOOD,UA: ABNORMAL
SL AMB POCT CLARITY,UA: CLEAR
SL AMB POCT COLOR,UA: YELLOW
SL AMB POCT KETONES,UA: NEGATIVE
SL AMB POCT NITRITE,UA: NEGATIVE
SL AMB POCT PH,UA: 5
SL AMB POCT SPECIFIC GRAVITY,UA: 1.03
SL AMB POCT URINE PROTEIN: NEGATIVE
SL AMB POCT UROBILINOGEN: 0.2

## 2023-05-03 RX ORDER — TRIAMCINOLONE ACETONIDE 1 MG/G
CREAM TOPICAL
COMMUNITY
Start: 2023-03-12

## 2023-05-03 NOTE — PROGRESS NOTES
[facility]        NAME: Zion Sampson is a 57 y o  female  : 1960    MRN: [id number]  DATE: May 3, 2023  TIME: 3:26 PM    Assessment and Plan   Urine abnormality [R82 90]  1  Urine abnormality  POCT urine dip        Urine dip mostly unremarkable except for moderate blood  Patient's description of symptoms raises concern for possible pelvic floor ailments such as a cystocele or uterine prolapse  Patient advised to see her PCP for an official GYN exam to confirm my suspicion  If positive, will likely need urogynecological intervention  Patient Instructions     Follow up with PCP in 3-5 days  Proceed to  ER if symptoms worsen  Chief Complaint     Chief Complaint   Patient presents with    Possible UTI     Pain  x yesterday,          History of Present Illness       40-year-old female presents today due to concerns for UTI  Had experienced some urethral discomfort a few weeks ago which resolved after taking a few tablets of antibiotic left over from her acute otitis media  Again about 2 days ago she started having increased urinary frequency, increased urgency and a urethral pain with trying to stand up from a seated position  Denies any dysuria, hematuria, fever, chills, vaginal discharge or new onset flank pain  Review of Systems   Review of Systems   Constitutional: Negative for chills and fever  Respiratory: Negative for shortness of breath  Cardiovascular: Negative for chest pain  Genitourinary: Positive for frequency, pelvic pain and urgency  Negative for dysuria, flank pain and hematuria  Musculoskeletal: Positive for back pain       Current Medications       Current Outpatient Medications:     amLODIPine (NORVASC) 5 mg tablet, Take 1 tablet (5 mg total) by mouth daily, Disp: 90 tablet, Rfl: 1    atenolol (TENORMIN) 100 mg tablet, Take 1 tablet (100 mg total) by mouth daily, Disp: 90 tablet, Rfl: 3    hydrochlorothiazide (HYDRODIURIL) 25 mg tablet, Take 1 tablet (25 mg total) by mouth daily, Disp: 90 tablet, Rfl: 6    hydroxychloroquine (PLAQUENIL) 200 mg tablet, Take 200 mg by mouth 2 (two) times a day with meals, Disp: , Rfl: 0    RESTASIS MULTIDOSE 0 05 % ophthalmic emulsion, Administer to both eyes every 12 (twelve) hours  , Disp: , Rfl:     traMADol (ULTRAM) 50 mg tablet, Take 50 mg by mouth every 6 (six) hours as needed for moderate pain, Disp: , Rfl:     Azelastine HCl 137 MCG/SPRAY SOLN, instill 1 spray into each nostril twice a day as directed (Patient not taking: Reported on 5/3/2023), Disp: 30 mL, Rfl: 0    dexamethasone sodium phosphate 0 1 % ophthalmic solution, 2 to 3 drops to affected ear twice daily until next visit (Patient not taking: Reported on 6/13/2022), Disp: 5 mL, Rfl: 4    fluticasone (FLONASE) 50 mcg/act nasal spray, 2 sprays into each nostril daily (Patient not taking: Reported on 5/3/2023), Disp: 16 g, Rfl: 3    meloxicam (MOBIC) 15 mg tablet, take 1 tablet by mouth once daily with food or milk if needed (Patient not taking: Reported on 5/3/2023), Disp: , Rfl:     ofloxacin (FLOXIN) 0 3 % otic solution, Administer 4 drops to the right ear 2 (two) times a day (Patient not taking: Reported on 6/13/2022), Disp: 10 mL, Rfl: 4    predniSONE 5 mg tablet, Take 1 tablet (5 mg total) by mouth daily (Patient not taking: Reported on 5/3/2023), Disp: 30 tablet, Rfl: 0    Pseudoephedrine-Ibuprofen (ADVIL COLD & SINUS LIQUI-GELS PO), Take by mouth (Patient not taking: Reported on 6/13/2022), Disp: , Rfl:     terconazole (TERAZOL 3) 0 8 % vaginal cream, INSERT 1 APPLICATOR INTO THE VAGINA NIGHTLY FOR 3 DAYS  (Patient not taking: Reported on 5/3/2023), Disp: , Rfl:     triamcinolone (KENALOG) 0 1 % cream, APPLY TOPICALLY TO THE AFFECTED AREA TWICE DAILY (Patient not taking: Reported on 5/3/2023), Disp: , Rfl:     Current Allergies     Allergies as of 05/03/2023 - Reviewed 05/03/2023   Allergen Reaction Noted    Pollen extract Other (See Comments) "           The following portions of the patient's history were reviewed and updated as appropriate: allergies, current medications, past family history, past medical history, past social history, past surgical history and problem list      Past Medical History:   Diagnosis Date    Diverticulitis, colon     Hypertension     Hypothyroidism     h/o \" borderline \"  issues    Liver cyst     last assessed 4/18/16    Lyme disease     Positive Anaplasma serology     [facility]) ([facility] )     Seizure disorder in pregnancy ([facility] )     had 1 seizure x1 1986 during pregnancy- none after    Varicose vein of leg        Past Surgical History:   Procedure Laterality Date    BACK SURGERY      fusion/refusion of 2-8 vertebrae    COLECTOMY      partial sigmoid, last assessed 4/18/16    JOINT REPLACEMENT      LAPAROSCOPY      with adnexectomy    LIVER SURGERY      AK SURGICAL ARTHROSCOPY LILLI W/CORACOACRM LIGM RLS Right 3/12/2020    Procedure: ARTHROSCOPY SHOULDER, BICEPS TENODESIS;  Surgeon: Roula Bazan MD;  [facility];  Service: Orthopedics    AK SURGICAL ARTHROSCOPY SHOULDER W/ROTATOR CUFF RPR Left 1/3/2019    Procedure: REPAIR ROTATOR CUFF  ARTHROSCOPIC, BICEPTS TENODESIS, SUBACROMIAL DECOMPRESION;  Surgeon: Roula Bazan MD;  [facility];  Service: Orthopedics    AK SURGICAL ARTHROSCOPY SHOULDER W/ROTATOR CUFF RPR Right 3/12/2020    Procedure: REPAIR ROTATOR CUFF  ARTHROSCOPIC WITH SUBACROMIAL DECOMPRESSION;  Surgeon: Roula Bazan MD;  [facility];  Service: Orthopedics    REPLACEMENT TOTAL KNEE Bilateral     SINUS SURGERY      TONSILLECTOMY      TYMPANOSTOMY TUBE PLACEMENT         Family History   Problem Relation Age of Onset    Hypertension Mother     Pancreatic cancer Mother 78    Arthritis Father     Hyperthyroidism Father     Hypertension Father     No Known Problems Sister     No Known Problems Daughter     No Known Problems Daughter     No " Known Problems Daughter     No Known Problems Maternal Grandmother     No Known Problems Maternal Grandfather     No Known Problems Paternal Grandmother     No Known Problems Paternal Grandfather     No Known Problems Brother     No Known Problems Maternal Aunt     No Known Problems Maternal Uncle     No Known Problems Paternal Aunt     No Known Problems Paternal Uncle     ADD / ADHD Neg Hx     Anesthesia problems Neg Hx     Cancer Neg Hx     Clotting disorder Neg Hx     Collagen disease Neg Hx     Diabetes Neg Hx     Dislocations Neg Hx     Learning disabilities Neg Hx     Neurological problems Neg Hx     Osteoporosis Neg Hx     Rheumatologic disease Neg Hx     Scoliosis Neg Hx     Vascular Disease Neg Hx          Medications have been verified  Objective   /67   Pulse 73   Temp (!) 97 °F (36 1 °C)   Resp 18   Wt 113 kg (249 lb)   BMI 39 00 kg/m²   No LMP recorded  Patient is postmenopausal        Physical Exam     Physical Exam  Vitals and nursing note reviewed  Constitutional:       General: She is not in acute distress  Appearance: Normal appearance  She is obese  She is not ill-appearing, toxic-appearing or diaphoretic  HENT:      Head: Normocephalic and atraumatic  Eyes:      General:         Right eye: No discharge  Left eye: No discharge  Conjunctiva/sclera: Conjunctivae normal    Pulmonary:      Effort: Pulmonary effort is normal    Abdominal:      Palpations: Abdomen is soft  Tenderness: There is no abdominal tenderness  Skin:     General: Skin is warm  Findings: No erythema  Neurological:      General: No focal deficit present  Mental Status: She is alert and oriented to person, place, and time  Psychiatric:         Mood and Affect: Mood normal          Behavior: Behavior normal          Thought Content:  Thought content normal          Judgment: Judgment normal

## 2023-06-17 DIAGNOSIS — I10 ESSENTIAL HYPERTENSION: ICD-10-CM

## 2023-06-21 RX ORDER — HYDROCHLOROTHIAZIDE 25 MG/1
TABLET ORAL
Qty: 90 TABLET | Refills: 6 | Status: SHIPPED | OUTPATIENT
Start: 2023-06-21

## 2023-07-07 ENCOUNTER — OFFICE VISIT (OUTPATIENT)
Dept: OBGYN CLINIC | Facility: CLINIC | Age: 63
End: 2023-07-07
Payer: MEDICARE

## 2023-07-07 ENCOUNTER — APPOINTMENT (OUTPATIENT)
Dept: RADIOLOGY | Facility: CLINIC | Age: 63
End: 2023-07-07
Payer: MEDICARE

## 2023-07-07 VITALS
BODY MASS INDEX: 38.11 KG/M2 | HEART RATE: 54 BPM | HEIGHT: 67 IN | DIASTOLIC BLOOD PRESSURE: 78 MMHG | SYSTOLIC BLOOD PRESSURE: 161 MMHG | WEIGHT: 242.8 LBS

## 2023-07-07 DIAGNOSIS — M25.512 LEFT SHOULDER PAIN, UNSPECIFIED CHRONICITY: ICD-10-CM

## 2023-07-07 DIAGNOSIS — M75.02 ADHESIVE CAPSULITIS OF LEFT SHOULDER: Primary | ICD-10-CM

## 2023-07-07 PROCEDURE — 73030 X-RAY EXAM OF SHOULDER: CPT

## 2023-07-07 PROCEDURE — 99213 OFFICE O/P EST LOW 20 MIN: CPT | Performed by: ORTHOPAEDIC SURGERY

## 2023-07-07 RX ORDER — LORATADINE 10 MG/1
TABLET ORAL
COMMUNITY

## 2023-07-07 NOTE — PROGRESS NOTES
Orthopedic Sports Medicine New Patient Visit     Assesment:   57 y.o. female with adhesive capsulitis of left shoulder    Plan:  After a thorough history, orthopedic exam, and review of pertinent imaging, I believe her symptoms correlate with adhesive capsulitis of the left shoulder. She does have an acute loss of range of motion without traumatic injury. We did discuss that loss of motion can be due to glenohumeral arthritis which she does have to some degree, however this acutely worsened motion in all planes appears to be more consistent with adhesive capsulitis. We discussed the natural history of disorder including the fact that it can take 1 to 2 years to completely resolve. We also discussed that this very rarely needs surgery. It is also certainly possible that her previous rotator cuff tear could be playing some role here however she has very good strength on exam today. We discussed the best course of treatment for her would be to attend physical therapy, which I placed order for today. I also recommended an ultrasound-guided injection, which I referred to my colleague Dr. Silvio Whitehead. I would be happy to see her back 6 weeks after the injection for further evaluation. Follow up:    No follow-ups on file. Chief Complaint   Patient presents with   • Left Shoulder - Pain       History of Present Illness: The patient is a 57 y.o., right hand dominant, female, complaining of left shoulder and arm pain. She states her pain is localized over the lateral aspect of the upper arm. She has prior history of left rotator cuff repair surgery in 2019 and revision in 2020 both done by Dr. Kyler Hu. She feels the pain she is currently experiencing is different than what she has felt in the past. She describes the pain as a constant ache and experiences sharp pain with movement. She reports a "tearing" sensation with reaching movements. She uses a pillow under the arm when driving for support.  She reports using neck decompression, TENS, and Voltaren gel which relieved her symptoms yesterday. She has used kinesiology tape on the left shoulder which has also provided relief. She saw her rheumatologist in May who administered a corticosteroid injection which provided temporary partial relief. She denies any numbness and tingling.  She denies any recent injuries or traumas    Affected shoulder SANE score: 50%    Shoulder Surgical History:  2019 - Left rotator cuff repair, biceps tenodesis, subacromial decompression  2020 - Left rotator cuff repair revision, subacromial decompression    Past Medical, Social and Family History:  Past Medical History:   Diagnosis Date   • Diverticulitis, colon    • Hypertension    • Hypothyroidism     h/o " borderline "  issues   • Liver cyst     last assessed 4/18/16   • Lyme disease    • Positive Anaplasma serology    • RA (rheumatoid arthritis) ([address])    • Seizure disorder in pregnancy ([address])     had 1 seizure x1 1986 during pregnancy- none after   • Varicose vein of leg      Past Surgical History:   Procedure Laterality Date   • BACK SURGERY      fusion/refusion of 2-8 vertebrae   • COLECTOMY      partial sigmoid, last assessed 4/18/16   • JOINT REPLACEMENT     • LAPAROSCOPY      with adnexectomy   • LIVER SURGERY     • SD SURGICAL ARTHROSCOPY LILLI W/CORACOACRM LIGM RLS Right 3/12/2020    Procedure: ARTHROSCOPY SHOULDER, BICEPS TENODESIS;  Surgeon: Evelio Roberts MD;  Location: [facility];  Service: Orthopedics   • SD SURGICAL ARTHROSCOPY SHOULDER W/ROTATOR CUFF RPR Left 1/3/2019    Procedure: REPAIR ROTATOR CUFF  ARTHROSCOPIC, BICEPTS TENODESIS, SUBACROMIAL DECOMPRESION;  Surgeon: Evelio Roberts MD;  Location: [facility];  Service: Orthopedics   • SD SURGICAL ARTHROSCOPY SHOULDER W/ROTATOR CUFF RPR Right 3/12/2020    Procedure: REPAIR ROTATOR CUFF  ARTHROSCOPIC WITH SUBACROMIAL DECOMPRESSION;  Surgeon: Evelio Roberts MD;  Location: [facility];  Service: Orthopedics   • REPLACEMENT TOTAL KNEE Bilateral    • SINUS SURGERY     • TONSILLECTOMY     • TYMPANOSTOMY TUBE PLACEMENT       Allergies   Allergen Reactions   • Bee Pollen Other (See Comments)     headaches   • Pollen Extract Other (See Comments)     headaches     Current Outpatient Medications on File Prior to Visit   Medication Sig Dispense Refill   • amLODIPine (NORVASC) 5 mg tablet Take 1 tablet (5 mg total) by mouth daily 90 tablet 1   • atenolol (TENORMIN) 100 mg tablet Take 1 tablet (100 mg total) by mouth daily 90 tablet 3   • hydrochlorothiazide (HYDRODIURIL) 25 mg tablet take 1 tablet by mouth once daily 90 tablet 6   • hydroxychloroquine (PLAQUENIL) 200 mg tablet Take 200 mg by mouth 2 (two) times a day with meals  0   • loratadine (Claritin) 10 mg tablet 1 tablet Orally Once a day PRN     • RESTASIS MULTIDOSE 0.05 % ophthalmic emulsion Administer to both eyes every 12 (twelve) hours       • traMADol (ULTRAM) 50 mg tablet Take 50 mg by mouth every 6 (six) hours as needed for moderate pain     • Azelastine HCl 137 MCG/SPRAY SOLN instill 1 spray into each nostril twice a day as directed (Patient not taking: Reported on 5/3/2023) 30 mL 0   • dexamethasone sodium phosphate 0.1 % ophthalmic solution 2 to 3 drops to affected ear twice daily until next visit (Patient not taking: Reported on 6/13/2022) 5 mL 4   • fluticasone (FLONASE) 50 mcg/act nasal spray 2 sprays into each nostril daily (Patient not taking: Reported on 5/3/2023) 16 g 3   • meloxicam (MOBIC) 15 mg tablet take 1 tablet by mouth once daily with food or milk if needed (Patient not taking: Reported on 7/7/2023)     • ofloxacin (FLOXIN) 0.3 % otic solution Administer 4 drops to the right ear 2 (two) times a day (Patient not taking: Reported on 6/13/2022) 10 mL 4   • Pseudoephedrine-Ibuprofen (ADVIL COLD & SINUS LIQUI-GELS PO) Take by mouth (Patient not taking: Reported on 6/13/2022)     • terconazole (TERAZOL 3) 0.8 % vaginal cream INSERT 1 APPLICATOR INTO THE VAGINA NIGHTLY FOR 3 DAYS. (Patient not taking: Reported on 5/3/2023)     • triamcinolone (KENALOG) 0.1 % cream APPLY TOPICALLY TO THE AFFECTED AREA TWICE DAILY (Patient not taking: Reported on 5/3/2023)     • [DISCONTINUED] predniSONE 5 mg tablet Take 1 tablet (5 mg total) by mouth daily (Patient not taking: Reported on 5/3/2023) 30 tablet 0     No current facility-administered medications on file prior to visit. Social History     Socioeconomic History   • Marital status: /Civil Union     Spouse name: Not on file   • Number of children: Not on file   • Years of education: Not on file   • Highest education level: Not on file   Occupational History   • Not on file   Tobacco Use   • Smoking status: Never   • Smokeless tobacco: Never   Vaping Use   • Vaping Use: Never used   Substance and Sexual Activity   • Alcohol use: No   • Drug use: No   • Sexual activity: Yes     Partners: Male   Other Topics Concern   • Not on file   Social History Narrative    Always uses seat belt     Social Determinants of Health     Financial Resource Strain: Not on file   Food Insecurity: Not on file   Transportation Needs: Not on file   Physical Activity: Not on file   Stress: Not on file   Social Connections: Not on file   Intimate Partner Violence: Not on file   Housing Stability: Not on file     I have reviewed the past medical, surgical, social and family history, medications and allergies as documented in the EMR. Review of systems: ROS is negative other than that noted in the HPI. Constitutional: Negative for fatigue and fever. HENT: Negative for sore throat. Respiratory: Negative for shortness of breath. Cardiovascular: Negative for chest pain. Gastrointestinal: Negative for abdominal pain. Endocrine: Negative for cold intolerance and heat intolerance. Genitourinary: Negative for flank pain. Musculoskeletal: Negative for back pain. Skin: Negative for rash.    Allergic/Immunologic: Negative for immunocompromised state. Neurological: Negative for dizziness. Psychiatric/Behavioral: Negative for agitation. Physical Exam:    Blood pressure 161/78, pulse (!) 54, height 5' 7" (1.702 m), weight 110 kg (242 lb 12.8 oz). General/Constitutional: NAD, well developed, well nourished  HENT: Normocephalic, atraumatic  CV: Intact distal pulses, regular rate  Resp: No respiratory distress or labored breathing  Abdomen: soft, nondistended, non tender   Lymphatic: No lymphadenopathy palpated  Neuro: Alert and Oriented x 3, no focal deficits  Psych: Normal mood, normal affect  Skin: Warm, dry, no rashes, no erythema      Shoulder Exam:   Inspection: No ecchymosis, edema, or deformity. No visualized wounds or skin lesions   Palpation: AC joint tenderness, bicipital groove tenderness  Active Motion:       FF: 120       ER: 20        IR: 20  Strength: 5/5 empty can, 5/5 ER,  5/5 IR   Sensory - SILT in the Radial / Ulnar / Median / Axillary nerve distributions  Motor - AIN / PIN / Radial / Ulnar / Median / Axillary motor nerves in tact  Palpable Radial pulse  Cap refill <2secs in all digits    Pain with all provacative tests      Imaging    I reviewed and interpreted X-rays of the left shoulder which show moderate acromioclavicular osteoarthritis and mild to moderate glenohumeral osteoarthritis with small inferior osteophyte present. I reviewed and interpreted MRI of the left shoulder which shows a partial thickness tear of the supraspinatus from 11/3/2020.     Scribe Attestation    I,:  Yahir Serrano am acting as a scribe while in the presence of the attending physician.:       I,:  Radhika Olson MD personally performed the services described in this documentation    as scribed in my presence.:

## 2023-07-29 DIAGNOSIS — I10 ESSENTIAL HYPERTENSION: ICD-10-CM

## 2023-07-31 ENCOUNTER — EVALUATION (OUTPATIENT)
Dept: PHYSICAL THERAPY | Facility: CLINIC | Age: 63
End: 2023-07-31
Payer: MEDICARE

## 2023-07-31 DIAGNOSIS — M75.02 ADHESIVE CAPSULITIS OF LEFT SHOULDER: ICD-10-CM

## 2023-07-31 DIAGNOSIS — M25.512 LEFT SHOULDER PAIN, UNSPECIFIED CHRONICITY: ICD-10-CM

## 2023-07-31 PROCEDURE — 97161 PT EVAL LOW COMPLEX 20 MIN: CPT

## 2023-07-31 PROCEDURE — 97110 THERAPEUTIC EXERCISES: CPT

## 2023-07-31 RX ORDER — AMLODIPINE BESYLATE 5 MG/1
5 TABLET ORAL DAILY
Qty: 90 TABLET | Refills: 1 | Status: SHIPPED | OUTPATIENT
Start: 2023-07-31 | End: 2023-08-01 | Stop reason: SDUPTHER

## 2023-07-31 NOTE — TELEPHONE ENCOUNTER
Please schedule your annual physical exam with me to review any recent changes in your medical history.

## 2023-07-31 NOTE — PROGRESS NOTES
PT Evaluation     Today's date: 2023  Patient name: Amandeep Dodd  : 1960  MRN: [id number]  Referring provider: Eli Mckeon  Dx:   Encounter Diagnosis     ICD-10-CM    1. Left shoulder pain, unspecified chronicity  M25.512 Ambulatory Referral to Physical Therapy      2. Adhesive capsulitis of left shoulder  M75.02 Ambulatory Referral to Physical Therapy                     Assessment  Assessment details: Amandeep Dodd is a 70 y.o. female who presents with pain, decreased strength, decreased ROM, decreased joint mobility and postural dysfunction. Due to these impairments, patient has difficulty performing ADL's, recreational activities, lifting/carrying, reaching. Patient's clinical presentation is consistent with their referring diagnosis of Left shoulder pain, unspecified chronicity  Adhesive capsulitis of left shoulder  Patient has been educated in home exercise program and plan of care. Patient would benefit from skilled physical therapy services to address their aforementioned functional limitations and progress towards prior level of function and independence with home exercise program. Patient does have history of bilat RTC repairs and chronic left partial RTC tear as per report. Her findings at this time are more indicative of moderate GHJ capsular restriction w/ good strength noted t/o.    Impairments: abnormal or restricted ROM, abnormal movement, activity intolerance, impaired physical strength, lacks appropriate home exercise program, pain with function, poor posture  and poor body mechanics    Goals  Short Term Goals to be accomplished in 4 weeks:  STG1: Pt will be I with HEP to maximize progress between therapy sessions  STG2: Pt will be I with posture management   STG3: Pt will demo 50% incshoulder AROM to improve self care and household ADLs  STG4: Pt will demo 1/2 MMT strength in shoulder  STG5: Pt will report pain 2/10 in shoulder w/ movement     Long Term Goals to be accomplished in 12 weeks:   LTG1: Pt will demo full shoulder AROM to improve functional mobility   LTG2: Pt will return to work/household ADLs pain free as per PLOF. LTG3: Pt will demo shoulder strength WNL to allow lifting/carrying. Plan  Plan details: HEP development, stretching, strengthening, A/AA/PROM, joint mobilizations, posture education, STM/MI as needed to reduce muscle tension, muscle reeducation, PLOC discussed and agreed upon with patient. Patient would benefit from: PT eval and skilled physical therapy  Planned modality interventions: cryotherapy and thermotherapy: hydrocollator packs  Planned therapy interventions: home exercise program, therapeutic exercise, therapeutic activities, self care, patient education, manual therapy and neuromuscular re-education  Frequency: 2x week  Plan of Care beginning date: 2023  Plan of Care expiration date: 10/23/2023  Treatment plan discussed with: patient        Subjective Evaluation    History of Present Illness  Mechanism of injury: Patient reports onset of L shldr pain since performing a lot of yard work and setting up for a party at the beginning of the summer. Reports since seeing orthopedic a few weeks ago it has improved but still reports limited motion and strength. Was scheduled for an injection however due to feeling better she cancelled. Reports she may reschedule it depending on how her shoulder feels prior to leaving for a vacation in a few weeks. PMH significant for L RC repair 2019, R RC repair 3/2020, B/L TKA, lumbar fusion,  and cervical herniated disc. Pt also reports hx of L incomplete RTC tear which she had therapy for in .    Pain  Current pain ratin  At best pain ratin  At worst pain rating: 3  Location: upper lateral arm  Quality: dull ache  Relieving factors: medications and rest  Aggravating factors: overhead activity and lifting (putting on bra, reaching behind back , lying on L side )    Hand dominance: right      Diagnostic Tests  X-ray: normal  Treatments  No previous or current treatments        Objective       Posture: rounded shoulders, forward head     AROM: standing R  L  Shoulder flex        150  120*  Shoulder abd       150  90*  ER @90  70  40*  Functional IR  T12   Lat hip*     PROM:  R  L  Shoulder flex        WNL  125*  Shoulder abd       WNL  100*  ER @ 90 abd  WNL  50*  IR @ 90 abd  WNL  50*    Cervical AROM loss: Mod loss t/o       MMT:    R  L  Shoulder flex  5/5  4-/5  Shoulder abd  5/5  3+/5*  IR   5/5  4+/5  ER   5/5  4+/5    Mechanical assessment:   Rep shld exten w/ cane: Dec/B improved   Rep c/s retraction: Dec/B      Tenderness/Palpation:  + TTP supraspinatus tendon  + TTP LH biceps tendon  + TTP UT     Joint Mobility:   + Moderate Hypomobility inf/posterior capsule        Precautions:   Past Medical History:   Diagnosis Date   • Diverticulitis, colon    • Hypertension    • Hypothyroidism     h/o " borderline "  issues   • Liver cyst     last assessed 4/18/16   • Lyme disease    • Positive Anaplasma serology    • RA (rheumatoid arthritis) ([address])    • Seizure disorder in pregnancy ([address])     had 1 seizure x1 1986 during pregnancy- none after   • Varicose vein of leg        SOC: 7/31/23  FOTO: 7/31/23  POC Expiration: 10/23/23  Daily Treatment Log:  Date 7/31/23        Visit # 1       Manual        GHJ mobs/ PROM                Ther Exer        Pulleys          Cerv retrac        Bilat shldr ext cane        Wall slides flex        Cane abd std         B ER AROM        S/L abd         S/L ER                                 HEP        Ther Activ                                                        NMReed        Rows tube        Bilat shldr ext tube                                                                Modalities                                  Access Code: [email or web address]  URL: [email or web address]  Date: 07/31/2023  Prepared by: Benja Downey    Exercises  - Seated Passive Cervical Retraction  - 3-5 x daily - 7 x weekly - 2 sets - 10 reps  - Standing Shoulder Extension AAROM with Dowel  - 3-5 x daily - 7 x weekly - 2 sets - 10 reps  - Standing shoulder flexion wall slides  - 1 x daily - 7 x weekly - 2 sets - 10 reps - 5 hold  - Standing Shoulder Abduction AAROM with Dowel  - 1 x daily - 7 x weekly - 2 sets - 10 reps - 5 hold  - Supine Shoulder External Rotation in 45 Degrees Abduction AAROM with Dowel  - 1 x daily - 7 x weekly - 10 reps - 10 hold

## 2023-07-31 NOTE — TELEPHONE ENCOUNTER
Patient is wanting RX to be sent to [facility] in [place] ([place]), [address]. She says that with Medicare [facility] and not [facility].

## 2023-08-01 DIAGNOSIS — I10 ESSENTIAL HYPERTENSION: ICD-10-CM

## 2023-08-01 RX ORDER — AMLODIPINE BESYLATE 5 MG/1
5 TABLET ORAL DAILY
Qty: 90 TABLET | Refills: 1 | Status: SHIPPED | OUTPATIENT
Start: 2023-08-01

## 2023-08-01 NOTE — PROGRESS NOTES
M[facility] rerout[facility] to [facility]  at [facility] in [place] ([place]), [address] as per patient's request. Follow-up visit scheduled for 9/2023.

## 2023-08-03 ENCOUNTER — APPOINTMENT (OUTPATIENT)
Dept: PHYSICAL THERAPY | Facility: CLINIC | Age: 63
End: 2023-08-03
Payer: MEDICARE

## 2023-08-07 ENCOUNTER — OFFICE VISIT (OUTPATIENT)
Dept: PHYSICAL THERAPY | Facility: CLINIC | Age: 63
End: 2023-08-07
Payer: MEDICARE

## 2023-08-07 DIAGNOSIS — M75.02 ADHESIVE CAPSULITIS OF LEFT SHOULDER: ICD-10-CM

## 2023-08-07 DIAGNOSIS — M25.512 LEFT SHOULDER PAIN, UNSPECIFIED CHRONICITY: Primary | ICD-10-CM

## 2023-08-07 PROCEDURE — 97110 THERAPEUTIC EXERCISES: CPT

## 2023-08-07 PROCEDURE — 97112 NEUROMUSCULAR REEDUCATION: CPT

## 2023-08-07 NOTE — PROGRESS NOTES
Daily Note     Today's date: 2023  Patient name: Dereje Harris  : 1960  MRN: [id number]  Referring provider: Ho Art  Dx:   Encounter Diagnosis     ICD-10-CM    1. Left shoulder pain, unspecified chronicity  M25.512       2. Adhesive capsulitis of left shoulder  M75.02                      Subjective: Patient reports her shoulder cont to improve, HEP went well. Was able to set up for her granddaughters party w/o inc in pain. Objective: See treatment diary below      Assessment: Tolerated treatment well, end range pain noted w/ stretching but no overall inc in pain. Patient demonstrated fatigue post treatment, exhibited good technique with therapeutic exercises and would benefit from continued PT. Plan: Continue per plan of care. Precautions:   Past Medical History:   Diagnosis Date   • Diverticulitis, colon    • Hypertension    • Hypothyroidism     h/o " borderline "  issues   • Liver cyst     last assessed 16   • Lyme disease    • Positive Anaplasma serology    • RA (rheumatoid arthritis) ([address])    • Seizure disorder in pregnancy ([address])     had 1 seizure x1  during pregnancy- none after   • Varicose vein of leg        SOC: 23  FOTO: 23  POC Expiration: 10/23/23  Daily Treatment Log:  Date 23      Visit # 1 2      Manual                        Ther Exer  30'      Pulleys    3' inc pain      Cerv retrac  2x10 3"       Bilat shldr ext cane  2x10       Wall slides flex  5" 2x10       Cane abd std   2x10       B ER AROM  2x10                                              HEP        Ther Activ                                                        NMReed  10'      Rows tube  Blue tube 2x15       Bilat shldr ext tube  Blue tube 2x15                                                              Modalities                                  Access Code: [email or web address]  URL: [email or web address]  Date: 2023  Prepared by: Lenin Abreu Nereyda Webster    Exercises  - Seated Passive Cervical Retraction  - 3-5 x daily - 7 x weekly - 2 sets - 10 reps  - Standing Shoulder Extension AAROM with Dowel  - 3-5 x daily - 7 x weekly - 2 sets - 10 reps  - Standing shoulder flexion wall slides  - 1 x daily - 7 x weekly - 2 sets - 10 reps - 5 hold  - Standing Shoulder Abduction AAROM with Dowel  - 1 x daily - 7 x weekly - 2 sets - 10 reps - 5 hold  - Supine Shoulder External Rotation in 45 Degrees Abduction AAROM with Dowel  - 1 x daily - 7 x weekly - 10 reps - 10 hold

## 2023-08-09 ENCOUNTER — TELEPHONE (OUTPATIENT)
Dept: PHYSICAL THERAPY | Facility: CLINIC | Age: 63
End: 2023-08-09

## 2023-08-09 NOTE — TELEPHONE ENCOUNTER
pt was offered a diff appt due to therapist being out, she was unable to move appts.  was told she could keep her original appt time but chose to cancel and come to next scheduled appt instead

## 2023-08-10 ENCOUNTER — APPOINTMENT (OUTPATIENT)
Dept: PHYSICAL THERAPY | Facility: CLINIC | Age: 63
End: 2023-08-10
Payer: MEDICARE

## 2023-08-14 ENCOUNTER — OFFICE VISIT (OUTPATIENT)
Dept: PHYSICAL THERAPY | Facility: CLINIC | Age: 63
End: 2023-08-14
Payer: MEDICARE

## 2023-08-14 DIAGNOSIS — M25.512 LEFT SHOULDER PAIN, UNSPECIFIED CHRONICITY: Primary | ICD-10-CM

## 2023-08-14 DIAGNOSIS — M75.02 ADHESIVE CAPSULITIS OF LEFT SHOULDER: ICD-10-CM

## 2023-08-14 PROCEDURE — 97110 THERAPEUTIC EXERCISES: CPT

## 2023-08-14 PROCEDURE — 97112 NEUROMUSCULAR REEDUCATION: CPT

## 2023-08-14 NOTE — PROGRESS NOTES
Daily Note     Today's date: 2023  Patient name: Yovany Hubbard  : 1960  MRN: [id number]  Referring provider: Cindy Mar  Dx:   Encounter Diagnosis     ICD-10-CM    1. Left shoulder pain, unspecified chronicity  M25.512       2. Adhesive capsulitis of left shoulder  M75.02                      Subjective: pt reports that things may be improving, she has not noticed her shoulder pain during sleeping like she was. She was sore last time for less than an hr. She cont with pain when reaching behind her in bed to get the blankets/pillow and also when she is driving      Objective: See treatment diary below      Assessment: Tolerated treatment well. Pt tolerated additional ROM TE well with some soreness post session. Cont to progress ROM as tolerated to maximize functional mobility of L UE. Patient would benefit from continued PT      Plan: Continue per plan of care.       Precautions:   Past Medical History:   Diagnosis Date   • Diverticulitis, colon    • Hypertension    • Hypothyroidism     h/o " borderline "  issues   • Liver cyst     last assessed 16   • Lyme disease    • Positive Anaplasma serology    • RA (rheumatoid arthritis) ([address])    • Seizure disorder in pregnancy ([address])     had 1 seizure x1 1986 during pregnancy- none after   • Varicose vein of leg        SOC: 23  FOTO: 23  POC Expiration: 10/23/23  Daily Treatment Log:  Date 23     Visit # 1 2 3     Manual                        Ther Exer  30' 28'     Pulleys    3' inc pain 3' w/ in tolerance      Cerv retrac  2x10 3"  3" 2x10      Bilat shldr ext cane  2x10  Uni 2x10      Wall slides flex  5" 2x10  5"x15      Cane abd std   2x10  2x10      B ER AROM  2x10 2x10      Supine horz abd/add    2x10      Supine ER strech w/ cane    5"x10      Supine AAROM cane flex    5" 2x10                              HEP        Ther Activ                                                        NMReed  10' 10' Rows tube  Blue tube 2x15  Blue tube 2x15      Bilat shldr ext tube  Blue tube 2x15 Blue tube 2x15                                                             Modalities                                  Access Code: [email or web address]  URL: [email or web address]  Date: 07/31/2023  Prepared by: Ria Ugalde    Exercises  - Seated Passive Cervical Retraction  - 3-5 x daily - 7 x weekly - 2 sets - 10 reps  - Standing Shoulder Extension AAROM with Dowel  - 3-5 x daily - 7 x weekly - 2 sets - 10 reps  - Standing shoulder flexion wall slides  - 1 x daily - 7 x weekly - 2 sets - 10 reps - 5 hold  - Standing Shoulder Abduction AAROM with Dowel  - 1 x daily - 7 x weekly - 2 sets - 10 reps - 5 hold  - Supine Shoulder External Rotation in 45 Degrees Abduction AAROM with Dowel  - 1 x daily - 7 x weekly - 10 reps - 10 hold

## 2023-08-16 ENCOUNTER — TELEPHONE (OUTPATIENT)
Dept: PHYSICAL THERAPY | Facility: CLINIC | Age: 63
End: 2023-08-16

## 2023-08-17 ENCOUNTER — APPOINTMENT (OUTPATIENT)
Dept: PHYSICAL THERAPY | Facility: CLINIC | Age: 63
End: 2023-08-17
Payer: MEDICARE

## 2023-08-21 ENCOUNTER — OFFICE VISIT (OUTPATIENT)
Dept: PHYSICAL THERAPY | Facility: CLINIC | Age: 63
End: 2023-08-21
Payer: MEDICARE

## 2023-08-21 DIAGNOSIS — M25.512 LEFT SHOULDER PAIN, UNSPECIFIED CHRONICITY: Primary | ICD-10-CM

## 2023-08-21 DIAGNOSIS — M75.02 ADHESIVE CAPSULITIS OF LEFT SHOULDER: ICD-10-CM

## 2023-08-21 PROCEDURE — 97110 THERAPEUTIC EXERCISES: CPT

## 2023-08-21 PROCEDURE — 97112 NEUROMUSCULAR REEDUCATION: CPT

## 2023-08-21 NOTE — PROGRESS NOTES
Daily Note     Today's date: 2023  Patient name: Patrick Torres  : 1960  MRN: [id number]  Referring provider: Antoinette Nugent  Dx:   Encounter Diagnosis     ICD-10-CM    1. Left shoulder pain, unspecified chronicity  M25.512       2. Adhesive capsulitis of left shoulder  M75.02                      Subjective: pt reports that her shoulder is doing pretty good, she did not have any problems after last session. She feels her discomfort is a lot less than what it was but her ROM is still limited. Her back is another c/c and will be assessed her next visit     Objective: See treatment diary below      Assessment: Tolerated treatment well. Pt tolerated additional supine AROM in pain free ranges. Cont with L UE stretching to maximize ROM and cont to dec discomfort. Patient would benefit from continued PT      Plan: Continue per plan of care. Pt to be seen for her LBP next visit, has spoken to her RA Dr about her back pain before and is going to let them know of this DA IE. She might be able to have them send a script prior to Monday's appt.       Precautions:   Past Medical History:   Diagnosis Date   • Diverticulitis, colon    • Hypertension    • Hypothyroidism     h/o " borderline "  issues   • Liver cyst     last assessed 16   • Lyme disease    • Positive Anaplasma serology    • RA (rheumatoid arthritis) ([address])    • Seizure disorder in pregnancy ([address])     had 1 seizure x1  during pregnancy- none after   • Varicose vein of leg        SOC: 23  FOTO: 23  POC Expiration: 10/23/23  Daily Treatment Log:  Date 23    Visit # 1 2 3 4    Manual                        Ther Exer  30' 28' 30'     Pulleys    3' inc pain 3' w/ in tolerance  5'     Cerv retrac  2x10 3"  3" 2x10  3" 2x10     Bilat shldr ext cane  2x10  Uni 2x10  Uni 2x10     Wall slides flex  5" 2x10  5"x15  5"x15     Cane abd std   2x10  2x10  2x10     B ER AROM  2x10 2x10  Supine 10x   SL 10x Supine horz abd/add    2x10  2x10     Supine ER strech w/ cane    5"x10  5"2x10     Supine AAROM cane flex    5" 2x10  5" 2x10     Supine AROM flex     2x10     SL shoulder abd     2x10             HEP        Ther Activ                                                        NMReed  10' 10' 10'     Rows tube  Blue tube 2x15  Blue tube 2x15  Blue tube 2x15     Bilat shldr ext tube  Blue tube 2x15 Blue tube 2x15 Blue tube 2x15                                                            Modalities                                  Access Code: [email or web address]  URL: [email or web address]  Date: 07/31/2023  Prepared by: Skye Coonort    Exercises  - Seated Passive Cervical Retraction  - 3-5 x daily - 7 x weekly - 2 sets - 10 reps  - Standing Shoulder Extension AAROM with Dowel  - 3-5 x daily - 7 x weekly - 2 sets - 10 reps  - Standing shoulder flexion wall slides  - 1 x daily - 7 x weekly - 2 sets - 10 reps - 5 hold  - Standing Shoulder Abduction AAROM with Dowel  - 1 x daily - 7 x weekly - 2 sets - 10 reps - 5 hold  - Supine Shoulder External Rotation in 45 Degrees Abduction AAROM with Dowel  - 1 x daily - 7 x weekly - 10 reps - 10 hold

## 2023-08-24 ENCOUNTER — APPOINTMENT (OUTPATIENT)
Dept: PHYSICAL THERAPY | Facility: CLINIC | Age: 63
End: 2023-08-24
Payer: MEDICARE

## 2023-08-28 ENCOUNTER — EVALUATION (OUTPATIENT)
Dept: PHYSICAL THERAPY | Facility: CLINIC | Age: 63
End: 2023-08-28
Payer: MEDICARE

## 2023-08-28 DIAGNOSIS — G89.29 CHRONIC LOW BACK PAIN, UNSPECIFIED BACK PAIN LATERALITY, UNSPECIFIED WHETHER SCIATICA PRESENT: Primary | ICD-10-CM

## 2023-08-28 DIAGNOSIS — M54.50 CHRONIC LOW BACK PAIN, UNSPECIFIED BACK PAIN LATERALITY, UNSPECIFIED WHETHER SCIATICA PRESENT: Primary | ICD-10-CM

## 2023-08-28 PROCEDURE — 97164 PT RE-EVAL EST PLAN CARE: CPT

## 2023-08-28 PROCEDURE — 97110 THERAPEUTIC EXERCISES: CPT

## 2023-08-28 NOTE — LETTER
2023    Kailey Carter MD  [address] [place] [address]    Patient: Duke Bonner   YOB: 1960   Date of Visit: 2023     Encounter Diagnosis     ICD-10-CM    1. Chronic low back pain, unspecified back pain laterality, unspecified whether sciatica present  M54.50     G89.29           Dear Dr. Amy Barker: Thank you for your recent referral of Duke Bonner. Please review the attached evaluation summary from [place]'s recent visit. Please verify that you agree with the plan of care by signing the attached order. If you have any questions or concerns, please do not hesitate to call. I sincerely appreciate the opportunity to share in the care of one of your patients and hope to have another opportunity to work with you in the near future. Sincerely,    Ashley Oconnell, PT      Referring Provider:      I certify that I have read the below Plan of Care and certify the need for these services furnished under this plan of treatment while under my care. Kailey Carter MD  Pending sale to [facility] [id number]  Via Fax: [phone]          PT Re-Evaluation     Today's date: 2023  Patient name: Duke Bonner  : 1960  MRN: [id number]  Referring provider: Ankur Underwood  Dx:   Encounter Diagnosis     ICD-10-CM    1. Chronic low back pain, unspecified back pain laterality, unspecified whether sciatica present  M54.50     G89.29                      Assessment  Assessment details: Duke Bonner is a 70 y.o. female who is currently treating for L shoulder pain who presents with onset of LBP which was evaluated today. Presents with pain, decreased strength, decreased ROM, impaired sensation, ambulatory dysfunction and postural dysfunction. Due to these impairments, patient has difficulty performing ADL's, recreational activities, engaging in social activities, ambulation, lifting/carrying.  Patient's clinical presentation is consistent with their referring diagnosis of Chronic low back pain, unspecified back pain laterality, unspecified whether sciatica present  (primary encounter diagnosis). Patient w/ signs and symptoms consistent w/ lumbar spinal stenosis w/ improvements made with repeated lumbar flexion in sitting . Patient has been educated in home exercise program and plan of care.  Patient would benefit from skilled physical therapy services to address their aforementioned functional limitations and progress towards prior level of function and independence with home exercise program.     Impairments: abnormal gait, abnormal or restricted ROM, activity intolerance, impaired physical strength, lacks appropriate home exercise program, pain with function, poor posture  and poor body mechanics    Goals  Short term goals to be accomplished in 4 weeks:  STG 1: Pt will demo independence with postural management  STG 2: Pt will demo I with HEP to maximize progress between therapy sessions  STG 3: Pt will demo L/S AROM < or = min loss throughout to promote improved functional mobility and body mechanics  STG 4: Pt will demo 1/2 MMT grade core stabilizers to improve lumbar stability with functional challenges  STG 5: Pt will reports pain dec freq and intensity 50%  STG 6: Pt will deny sleep disturbance    Long term goals to be accomplished in 12 weeks:  LTG 1: Pt will demo good body mech with >75% functional challenges to prevent reinjury  LTG 2: Pt will be able to return to PLOF pain-free   LTG 3: Pt will demo Good strength core stabilizers to promote carryover with body mech and posture    Shoulder: Not assessed today   Short Term Goals to be accomplished in 4 weeks:  STG1: Pt will be I with HEP to maximize progress between therapy sessions  STG2: Pt will be I with posture management   STG3: Pt will demo 50% incshoulder AROM to improve self care and household ADLs  STG4: Pt will demo 1/2 MMT strength in shoulder  STG5: Pt will report pain 2/10 in shoulder w/ movement     Long Term Goals to be accomplished in 12 weeks:   LTG1: Pt will demo full shoulder AROM to improve functional mobility   LTG2: Pt will return to work/household ADLs pain free as per PLOF. LTG3: Pt will demo shoulder strength WNL to allow lifting/carrying. Plan  Plan details: HEP development, stretching, strengthening, A/AA/PROM, joint mobilizations, posture education, STM/MI as needed to reduce muscle tension, muscle reeducation, PLOC discussed and agreed upon with patient. Patient would benefit from: PT eval and skilled physical therapy  Planned modality interventions: cryotherapy and thermotherapy: hydrocollator packs  Planned therapy interventions: manual therapy, neuromuscular re-education, self care, therapeutic activities, therapeutic exercise and home exercise program  Frequency: 2x week  Plan of Care beginning date: 2023  Plan of Care expiration date: 10/23/2023  Treatment plan discussed with: patient        Subjective Evaluation    History of Present Illness  Mechanism of injury: Patients reports recent increase in low back pain over the past two months. Patient reports increase pain w/ activities including prolonged standing, prolonged sitting. Patient states she had to do a 2 hr drive last week and the next day she was in significant pain. Reports pain in bilateral feet on/off, notes soreness extending up into her back. Patient had L4-L5 fusion performed approx 10 years ago. Patient reports she is leaving for a trip to [facility] in several weeks and would like to get better in order to enjoy her trip. She is seeing chiro this week as well. Patient is currently treating for her shoulder.     Patient Goals  Patient goals for therapy: decreased pain, increased motion, independence with ADLs/IADLs, return to sport/leisure activities and increased strength    Pain  Current pain ratin  At worst pain ratin  Location: midline lumbar spine  Quality: dull ache, discomfort and radiating (stinging, nubmness )  Relieving factors: medications (tramadol )  Aggravating factors: walking, standing and sitting  Progression: worsening    Treatments  Previous treatment: physical therapy and chiropractic (surgery )        Objective    Posture: Lumbar lordosis is dec in standing. There is nil lateral shift. Lumbar AROM limitations:  (*=  Pain)  Lumbar flexion: min loss (tight)  Lumbar extension: mod loss (tight)  R side glide: min loss*   L side glide: nil loss (tight)    Mechanical Asessment: pre-test symtpoms include 4/10 localized midline lumbar pain  LUIS: Inc/W   Repeated Extension in Standing (TRUPTI): Inc/W   RFIS: Dec/B     Strength:  Core strength: Poor     Right  Left  Hip flexion:  4/5  4/5  Knee ext  4+/5  4+/5  Ankle DF  5/5  5/5  Knee flex  5/5  5/5    Hip abduction  3+/5  3+/5  Hip extension  3+/5  3+/5    Tenderness/Palpation:   + TTP bilateral lumbar paraspinals   + TTP bilat piriformis     Sensation:   + Intact t/o bilat LE's      Flexibility:   + Moderate tightness bilat hip flexors/quads  + Moderate tightness bilat hip external rotators     Function:   Pain w/ prolonged sitting, walking, standing. Pain reduced w/ support/ leaning forward on shopping cart.                Precautions:   Past Medical History:   Diagnosis Date   • Diverticulitis, colon    • Hypertension    • Hypothyroidism     h/o " borderline "  issues   • Liver cyst     last assessed 4/18/16   • Lyme disease    • Positive Anaplasma serology    • RA (rheumatoid arthritis) ([address])    • Seizure disorder in pregnancy ([address])     had 1 seizure x1 1986 during pregnancy- none after   • Varicose vein of leg       SOC: 7/31/23  FOTO: 8/28/23 (LPB)  POC Expiration: 10/23/23  Daily Treatment Log:  Date 7/31/23 8/7/23 8/14/2023 8/21/2023 8/128/23    Visit # 1 2 3 4 5 RE (LBP)/FOTO   Manual                        Ther Exer  30' 28' 30'     Pulleys    3' inc pain 3' w/ in tolerance  5' Cerv retrac  2x10 3"  3" 2x10  3" 2x10     Bilat shldr ext cane  2x10  Uni 2x10  Uni 2x10     Wall slides flex  5" 2x10  5"x15  5"x15     Cane abd std   2x10  2x10  2x10     B ER AROM  2x10 2x10  Supine 10x   SL 10x     Supine horz abd/add    2x10  2x10     Supine ER strech w/ cane    5"x10  5"2x10     Supine AAROM cane flex    5" 2x10  5" 2x10     Supine AROM flex     2x10     SL shoulder abd     2x10     RFIsit         Hip flexor str off edge         Std hip abd/ext        Fig 4 str                         HEP        Ther Activ                                                        NMReed  10' 10' 10'     Rows tube  Blue tube 2x15  Blue tube 2x15  Blue tube 2x15     Bilat shldr ext tube  Blue tube 2x15 Blue tube 2x15 Blue tube 2x15    Lida [employer]ye[employer] press                                                 Modalities                                  Access Code: [email or web address]  URL: [email or web address]  Date: 07/31/2023  Prepared by: Cat Mclain    Exercises  - Seated Passive Cervical Retraction  - 3-5 x daily - 7 x weekly - 2 sets - 10 reps  - Standing Shoulder Extension AAROM with Dowel  - 3-5 x daily - 7 x weekly - 2 sets - 10 reps  - Standing shoulder flexion wall slides  - 1 x daily - 7 x weekly - 2 sets - 10 reps - 5 hold  - Standing Shoulder Abduction AAROM with Dowel  - 1 x daily - 7 x weekly - 2 sets - 10 reps - 5 hold  - Supine Shoulder External Rotation in 45 Degrees Abduction AAROM with Dowel  - 1 x daily - 7 x weekly - 10 reps - 10 hold      HEP:   RFISit x10 2-3hrs or as needed

## 2023-08-28 NOTE — PROGRESS NOTES
PT Re-Evaluation     Today's date: 2023  Patient name: Brenda Casper  : 1960  MRN: [id number]  Referring provider: Tash Cody  Dx:   Encounter Diagnosis     ICD-10-CM    1. Chronic low back pain, unspecified back pain laterality, unspecified whether sciatica present  M54.50     G89.29                      Assessment  Assessment details: Brenda Casper is a 70 y.o. female who is currently treating for L shoulder pain who presents with onset of LBP which was evaluated today. Presents with pain, decreased strength, decreased ROM, impaired sensation, ambulatory dysfunction and postural dysfunction. Due to these impairments, patient has difficulty performing ADL's, recreational activities, engaging in social activities, ambulation, lifting/carrying. Patient's clinical presentation is consistent with their referring diagnosis of Chronic low back pain, unspecified back pain laterality, unspecified whether sciatica present  (primary encounter diagnosis). Patient w/ signs and symptoms consistent w/ lumbar spinal stenosis w/ improvements made with repeated lumbar flexion in sitting . Patient has been educated in home exercise program and plan of care.  Patient would benefit from skilled physical therapy services to address their aforementioned functional limitations and progress towards prior level of function and independence with home exercise program.     Impairments: abnormal gait, abnormal or restricted ROM, activity intolerance, impaired physical strength, lacks appropriate home exercise program, pain with function, poor posture  and poor body mechanics    Goals  Short term goals to be accomplished in 4 weeks:  STG 1: Pt will demo independence with postural management  STG 2: Pt will demo I with HEP to maximize progress between therapy sessions  STG 3: Pt will demo L/S AROM < or = min loss throughout to promote improved functional mobility and body mechanics  STG 4: Pt will demo 1/2 MMT grade core stabilizers to improve lumbar stability with functional challenges  STG 5: Pt will reports pain dec freq and intensity 50%  STG 6: Pt will deny sleep disturbance    Long term goals to be accomplished in 12 weeks:  LTG 1: Pt will demo good body mech with >75% functional challenges to prevent reinjury  LTG 2: Pt will be able to return to PLOF pain-free   LTG 3: Pt will demo Good strength core stabilizers to promote carryover with body mech and posture    Shoulder: Not assessed today   Short Term Goals to be accomplished in 4 weeks:  STG1: Pt will be I with HEP to maximize progress between therapy sessions  STG2: Pt will be I with posture management   STG3: Pt will demo 50% incshoulder AROM to improve self care and household ADLs  STG4: Pt will demo 1/2 MMT strength in shoulder  STG5: Pt will report pain 2/10 in shoulder w/ movement     Long Term Goals to be accomplished in 12 weeks:   LTG1: Pt will demo full shoulder AROM to improve functional mobility   LTG2: Pt will return to work/household ADLs pain free as per PLOF. LTG3: Pt will demo shoulder strength WNL to allow lifting/carrying. Plan  Plan details: HEP development, stretching, strengthening, A/AA/PROM, joint mobilizations, posture education, STM/MI as needed to reduce muscle tension, muscle reeducation, PLOC discussed and agreed upon with patient.       Patient would benefit from: PT eval and skilled physical therapy  Planned modality interventions: cryotherapy and thermotherapy: hydrocollator packs  Planned therapy interventions: manual therapy, neuromuscular re-education, self care, therapeutic activities, therapeutic exercise and home exercise program  Frequency: 2x week  Plan of Care beginning date: 8/28/2023  Plan of Care expiration date: 10/23/2023  Treatment plan discussed with: patient        Subjective Evaluation    History of Present Illness  Mechanism of injury: Patients reports recent increase in low back pain over the past two months. Patient reports increase pain w/ activities including prolonged standing, prolonged sitting. Patient states she had to do a 2 hr drive last week and the next day she was in significant pain. Reports pain in bilateral feet on/off, notes soreness extending up into her back. Patient had L4-L5 fusion performed approx 10 years ago. Patient reports she is leaving for a trip to [facility] in several weeks and would like to get better in order to enjoy her trip. She is seeing chiro this week as well. Patient is currently treating for her shoulder. Patient Goals  Patient goals for therapy: decreased pain, increased motion, independence with ADLs/IADLs, return to sport/leisure activities and increased strength    Pain  Current pain ratin  At worst pain ratin  Location: midline lumbar spine  Quality: dull ache, discomfort and radiating (stinging, nubmness )  Relieving factors: medications (tramadol )  Aggravating factors: walking, standing and sitting  Progression: worsening    Treatments  Previous treatment: physical therapy and chiropractic (surgery )        Objective    Posture: Lumbar lordosis is dec in standing. There is nil lateral shift. Lumbar AROM limitations:  (*=  Pain)  Lumbar flexion: min loss (tight)  Lumbar extension: mod loss (tight)  R side glide: min loss*   L side glide: nil loss (tight)    Mechanical Asessment: pre-test symtpoms include 4/10 localized midline lumbar pain  LUIS: Inc/W   Repeated Extension in Standing (TRUPTI):  Inc/W   RFIS: Dec/B     Strength:  Core strength: Poor     Right  Left  Hip flexion:  4/5  4/5  Knee ext  4+/5  4+/5  Ankle DF  5/5  5/5  Knee flex  5/5  5/5    Hip abduction  3+/5  3+/5  Hip extension  3+/5  3+/5    Tenderness/Palpation:   + TTP bilateral lumbar paraspinals   + TTP bilat piriformis     Sensation:   + Intact t/o bilat LE's      Flexibility:   + Moderate tightness bilat hip flexors/quads  + Moderate tightness bilat hip external rotators Function:   Pain w/ prolonged sitting, walking, standing. Pain reduced w/ support/ leaning forward on shopping cart. Precautions:   Past Medical History:   Diagnosis Date   • Diverticulitis, colon    • Hypertension    • Hypothyroidism     h/o " borderline "  issues   • Liver cyst     last assessed 4/18/16   • Lyme disease    • Positive Anaplasma serology    • RA (rheumatoid arthritis) ([address])    • Seizure disorder in pregnancy ([address])     had 1 seizure x1 1986 during pregnancy- none after   • Varicose vein of leg       SOC: 7/31/23  FOTO: 8/28/23 (LPB)  POC Expiration: 10/23/23  Daily Treatment Log:  Date 7/31/23 8/7/23 8/14/2023 8/21/2023 8/128/23    Visit # 1 2 3 4 5 RE (LBP)/FOTO   Manual                        Ther Exer  30' 28' 30'     Pulleys    3' inc pain 3' w/ in tolerance  5'     Cerv retrac  2x10 3"  3" 2x10  3" 2x10     Bilat shldr ext cane  2x10  Uni 2x10  Uni 2x10     Wall slides flex  5" 2x10  5"x15  5"x15     Cane abd std   2x10  2x10  2x10     B ER AROM  2x10 2x10  Supine 10x   SL 10x     Supine horz abd/add    2x10  2x10     Supine ER strech w/ cane    5"x10  5"2x10     Supine AAROM cane flex    5" 2x10  5" 2x10     Supine AROM flex     2x10     SL shoulder abd     2x10     RFIsit         Hip flexor str off edge         Std hip abd/ext        Fig 4 str                         HEP        Ther Activ                                                        NMReed  10' 10' 10'     Rows tube  Blue tube 2x15  Blue tube 2x15  Blue tube 2x15     Bilat shldr ext tube  Blue tube 2x15 Blue tube 2x15 Blue tube 2x15    Lida         [facility]                                                 Modalities                                  Access Code: [email or web address]  URL: [email or web address]  Date: 07/31/2023  Prepared by: Emily Sanders    Exercises  - Seated Passive Cervical Retraction  - 3-5 x daily - 7 x weekly - 2 sets - 10 reps  - Standing Shoulder Extension AAROM with Dowel  - 3-5 x daily - 7 x weekly - 2 sets - 10 reps  - Standing shoulder flexion wall slides  - 1 x daily - 7 x weekly - 2 sets - 10 reps - 5 hold  - Standing Shoulder Abduction AAROM with Dowel  - 1 x daily - 7 x weekly - 2 sets - 10 reps - 5 hold  - Supine Shoulder External Rotation in 45 Degrees Abduction AAROM with Dowel  - 1 x daily - 7 x weekly - 10 reps - 10 hold      HEP:   RFISit x10 2-3hrs or as needed

## 2023-08-31 ENCOUNTER — EVALUATION (OUTPATIENT)
Dept: PHYSICAL THERAPY | Facility: CLINIC | Age: 63
End: 2023-08-31
Payer: MEDICARE

## 2023-08-31 ENCOUNTER — HOSPITAL ENCOUNTER (OUTPATIENT)
Dept: RADIOLOGY | Facility: HOSPITAL | Age: 63
Discharge: HOME | End: 2023-08-31
Payer: MEDICARE

## 2023-08-31 DIAGNOSIS — M54.40 LOW BACK PAIN WITH SCIATICA, SCIATICA LATERALITY UNSPECIFIED, UNSPECIFIED BACK PAIN LATERALITY, UNSPECIFIED CHRONICITY: ICD-10-CM

## 2023-08-31 DIAGNOSIS — M75.02 ADHESIVE CAPSULITIS OF LEFT SHOULDER: ICD-10-CM

## 2023-08-31 DIAGNOSIS — M25.512 LEFT SHOULDER PAIN, UNSPECIFIED CHRONICITY: Primary | ICD-10-CM

## 2023-08-31 DIAGNOSIS — M54.6 PAIN IN THORACIC SPINE: ICD-10-CM

## 2023-08-31 PROCEDURE — 72110 X-RAY EXAM L-2 SPINE 4/>VWS: CPT

## 2023-08-31 PROCEDURE — 97110 THERAPEUTIC EXERCISES: CPT

## 2023-08-31 PROCEDURE — 72072 X-RAY EXAM THORAC SPINE 3VWS: CPT

## 2023-08-31 PROCEDURE — 97112 NEUROMUSCULAR REEDUCATION: CPT

## 2023-08-31 NOTE — PROGRESS NOTES
PT Re-Evaluation     Today's date: 2023  Patient name: Edmond Aragon  : 1960  MRN: [id number]  Referring provider: Anny Vazquez  Dx:   Encounter Diagnosis     ICD-10-CM    1. Left shoulder pain, unspecified chronicity  M25.512       2. Adhesive capsulitis of left shoulder  M75.02                      Assessment  Assessment details: Edmond Aragon is a 70 y.o. female who has been participating in skilled therapy for 6 sessions and demonstrates progress towards est LTG's. Patient demonstrates improvements with objective measures including shoulder ROM/jt mobility and strength, however continues to report pain jt w/ overhead mobility. Due to these impairments, patient has difficulty performing ADL's, recreational activities, lifting/carrying, reaching. Patient's clinical presentation is consistent with their referring diagnosis of Left shoulder pain, unspecified chronicity  Adhesive capsulitis of left shoulder  Patient has been educated in home exercise program and plan of care.  Patient would benefit from continued skilled physical therapy services to address their aforementioned functional limitations and progress towards prior level of function and independence with home exercise program.   Impairments: abnormal or restricted ROM, abnormal movement, activity intolerance, impaired physical strength, lacks appropriate home exercise program, pain with function, poor posture  and poor body mechanics    Goals  Short Term Goals to be accomplished in 4 weeks: (partially)  STG1: Pt will be I with HEP to maximize progress between therapy sessions (partially)  STG2: Pt will be I with posture management   STG3: Pt will demo 50% inc shoulder AROM to improve self care and household ADLs (partially met)  STG4: Pt will demo 1/2 MMT strength in shoulder (met)  STG5: Pt will report pain 2/10 in shoulder w/ movement (not met)     Long Term Goals to be accomplished in 12 weeks: (progressing towards)  LTG1: Pt will demo full shoulder AROM to improve functional mobility   LTG2: Pt will return to work/household ADLs pain free as per PLOF. LTG3: Pt will demo shoulder strength WNL to allow lifting/carrying. Plan  Plan details: HEP development, stretching, strengthening, A/AA/PROM, joint mobilizations, posture education, STM/MI as needed to reduce muscle tension, muscle reeducation, PLOC discussed and agreed upon with patient. Patient would benefit from: PT eval and skilled physical therapy  Planned modality interventions: cryotherapy and thermotherapy: hydrocollator packs  Planned therapy interventions: home exercise program, therapeutic exercise, therapeutic activities, self care, patient education, manual therapy and neuromuscular re-education  Frequency: 2x week  Plan of Care beginning date: 7/31/2023  Plan of Care expiration date: 10/23/2023  Treatment plan discussed with: patient        Subjective Evaluation    History of Present Illness  Mechanism of injury: RE: Patient reports some improvement in range of motion of shoulder since starting therapy however she remains limited and painful w/ activity. Notes pain with any overhead motion or reaching out to the side which is now trying to avoid. Reports onset of L sided neck pain over the last few days as well. She will be going to see Dr. Janes Reese next week for second opinion. Patient is currently experiencing LBP which she was evaluated for last session and has been going to [facility] as well. She is getting xray today. Patient reports onset of L shldr pain since performing a lot of yard work and setting up for a party at the beginning of the summer. Reports since seeing orthopedic a few weeks ago it has improved but still reports limited motion and strength. Was scheduled for an injection however due to feeling better she cancelled.  Reports she may reschedule it depending on how her shoulder feels prior to leaving for a vacation in a few weeks.       PMH significant for L RC repair 2019, R RC repair 3/2020, B/L TKA, lumbar fusion,  and cervical herniated disc. Pt also reports hx of L incomplete RTC tear which she had therapy for in . Patient Goals  Patient goals for therapy: decreased pain, increased motion, increased strength, independence with ADLs/IADLs and return to sport/leisure activities    Pain  Current pain ratin  At best pain ratin  At worst pain rating: 3  Location: upper lateral arm, anterior shoulder,  upper trap   Quality: dull ache and tight  Relieving factors: medications and rest  Aggravating factors: overhead activity and lifting (putting on bra, reaching behind back , lying on L side )  Progression: improved    Hand dominance: right      Diagnostic Tests  X-ray: normal  Treatments  No previous or current treatments        Objective       Posture: rounded shoulders, forward head     AROM: standing R  L  Shoulder flex        150  125*  Shoulder abd       150  115*  ER @90  70  40*  Functional IR  T12   L4*   Functional ER  Back of head Back of head    PROM:  R  L  Shoulder flex        WNL  130*  Shoulder abd       WNL  120*  ER @ 90 abd  WNL  55*  IR @ 90 abd  WNL  60    Cervical AROM loss:   Mod loss t/o       MMT:    R  L  Shoulder flex  5/5  4+/5  Shoulder abd  5/5  4-/5*  IR   5/5  4+/5  ER   5/5  4+/5    Mechanical assessment:   Rep shld exten w/ cane: Dec/ improved   Rep c/s retraction: Dec/    Tenderness/Palpation:  + TTP supraspinatus tendon  + TTP LH biceps tendon  + TTP UT     Joint Mobility:   + Mod- min hypomobility inf/posterior capsule        Precautions:   Past Medical History:   Diagnosis Date   • Diverticulitis, colon    • Hypertension    • Hypothyroidism     h/o " borderline "  issues   • Liver cyst     last assessed 16   • Lyme disease    • Positive Anaplasma serology    • RA (rheumatoid arthritis) ([address])    • Seizure disorder in pregnancy ([address])     had 1 seizure x1  during pregnancy- none after   • Varicose vein of leg        SOC: 7/31/23  FOTO: 8/28/23 (LPB)  POC Expiration: 10/23/23  Daily Treatment Log:  Date 8/31/23        Visit # 6 RE shldr        Manual                        Ther Exer 30'       Pulleys          Cerv retrac        Bilat shldr ext cane 2x10        Table slide on pball 10x5"        Cane abd std  1x10        B ER AROM        Supine horz abd/add         Supine ER strech w/ cane         Supine AAROM cane flex         Supine AROM flex         SL shoulder abd         RFIsit  Reviewed        Hip flexor str off edge  20"x3 R/L       Std hip abd/ext        Fig 4 str  20"x3 L   P! W/ R        LTR  1x10 R/L       L/S flexion in supine w/GPB 1x10               HEP        Ther Activ                                                        NMReed 10'       Rows tube Blue tube 2x10       Bilat shldr ext tube Blue tube 2x10        Lida         [facility]                                                 Modalities                                  Access Code: [email or web address]  URL: [email or web address]  Date: 08/31/2023  Prepared by: Shonda Barrera    Exercises  - Seated Passive Cervical Retraction  - 3-5 x daily - 7 x weekly - 2 sets - 10 reps  - Standing Shoulder Extension AAROM with Dowel  - 3-5 x daily - 7 x weekly - 2 sets - 10 reps  - Seated Lumbar Flexion Stretch  - 3-5 x daily - 7 x weekly - 2 sets - 10 reps  - Standing Shoulder Abduction AAROM with Dowel  - 1 x daily - 7 x weekly - 2 sets - 10 reps - 5 hold  - Seated Bilateral Shoulder Flexion Towel Slide at Table Top  - 1 x daily - 7 x weekly - 2 sets - 10 reps - 5 hold  - Supine Lower Trunk Rotation  - 1 x daily - 7 x weekly - 10 reps - 5 hold  - Supine Double Knee to Chest  - 1 x daily - 7 x weekly - 10 reps - 5 hold  - Modified Pierre Stretch  - 1 x daily - 7 x weekly - 3 reps - 20 hold

## 2023-09-05 ENCOUNTER — OFFICE VISIT (OUTPATIENT)
Dept: PHYSICAL THERAPY | Facility: CLINIC | Age: 63
End: 2023-09-05
Payer: MEDICARE

## 2023-09-05 DIAGNOSIS — M75.02 ADHESIVE CAPSULITIS OF LEFT SHOULDER: ICD-10-CM

## 2023-09-05 DIAGNOSIS — M25.512 LEFT SHOULDER PAIN, UNSPECIFIED CHRONICITY: Primary | ICD-10-CM

## 2023-09-05 DIAGNOSIS — G89.29 CHRONIC LOW BACK PAIN, UNSPECIFIED BACK PAIN LATERALITY, UNSPECIFIED WHETHER SCIATICA PRESENT: ICD-10-CM

## 2023-09-05 DIAGNOSIS — M54.50 CHRONIC LOW BACK PAIN, UNSPECIFIED BACK PAIN LATERALITY, UNSPECIFIED WHETHER SCIATICA PRESENT: ICD-10-CM

## 2023-09-05 PROCEDURE — 97140 MANUAL THERAPY 1/> REGIONS: CPT

## 2023-09-05 PROCEDURE — 97110 THERAPEUTIC EXERCISES: CPT

## 2023-09-05 NOTE — PROGRESS NOTES
Daily Note     Today's date: 2023  Patient name: Monica Taylor  : 1960  MRN: [id number]  Referring provider: Daisy Enriquez  Dx:   Encounter Diagnosis     ICD-10-CM    1. Left shoulder pain, unspecified chronicity  M25.512       2. Adhesive capsulitis of left shoulder  M75.02       3. Chronic low back pain, unspecified back pain laterality, unspecified whether sciatica present  M54.50     G89.29                      Subjective: Patient reports pain has shifted to mainly L side of low back w/ symptoms extending into glut and post thigh. Notes pain cont to be relief w/ bending forward and sitting. States she did a lot of standing over the week which inc her pain. Waiting Xray results. Objective: See treatment diary below      Assessment: Tolerated treatment well, improvements noted post manual treatment today. No change in symptoms w/ directional exercises today. Patient demonstrated fatigue post treatment, exhibited good technique with therapeutic exercises and would benefit from continued PT. Plan: Continue per plan of care.       Precautions:   Past Medical History:   Diagnosis Date   • Diverticulitis, colon    • Hypertension    • Hypothyroidism     h/o " borderline "  issues   • Liver cyst     last assessed 16   • Lyme disease    • Positive Anaplasma serology    • RA (rheumatoid arthritis) ([address])    • Seizure disorder in pregnancy ([address])     had 1 seizure x1  during pregnancy- none after   • Varicose vein of leg        SOC: 23  FOTO: 23 (LPB)  POC Expiration: 10/23/23  Daily Treatment Log:  Date 23       Visit # 6 RE shldr  7      Manual  10'      LAD bilat prone/supine  LL      Piri release bilat  LL              Ther Exer 30' 30'      Pulleys          Cerv retrac        Bilat shldr ext cane 2x10        Table slide on pball 10x5"        Cane abd std  1x10        B ER AROM        Supine horz abd/add         Supine ER strech w/ cane         Supine AAROM cane flex         Supine AROM flex         SL shoulder abd         RFIsit  Reviewed  1x10 to start       Hip flexor str off edge  20"x3 R/L 30"x3 R/L       Std hip abd/ext        Fig 4 str  20"x3 L   P! W/ R        LTR  1x10 R/L 1x10 R/L       RFIL pball 1x10 1x10 5"               HEP        Ther Activ                                                        NMReed 10'       Rows tube Blue tube 2x10       Bilat shldr ext tube Blue tube 2x10        Lida         [facility]                                                 Modalities                                  Access Code: [email or web address]  URL: [email or web address]  Date: 08/31/2023  Prepared by: Katherine Kim    Exercises  - Seated Passive Cervical Retraction  - 3-5 x daily - 7 x weekly - 2 sets - 10 reps  - Standing Shoulder Extension AAROM with Dowel  - 3-5 x daily - 7 x weekly - 2 sets - 10 reps  - Seated Lumbar Flexion Stretch  - 3-5 x daily - 7 x weekly - 2 sets - 10 reps  - Standing Shoulder Abduction AAROM with Dowel  - 1 x daily - 7 x weekly - 2 sets - 10 reps - 5 hold  - Seated Bilateral Shoulder Flexion Towel Slide at Table Top  - 1 x daily - 7 x weekly - 2 sets - 10 reps - 5 hold  - Supine Lower Trunk Rotation  - 1 x daily - 7 x weekly - 10 reps - 5 hold  - Supine Double Knee to Chest  - 1 x daily - 7 x weekly - 10 reps - 5 hold  - Modified Pierre Stretch  - 1 x daily - 7 x weekly - 3 reps - 20 hold

## 2023-09-07 ENCOUNTER — APPOINTMENT (OUTPATIENT)
Dept: PHYSICAL THERAPY | Facility: CLINIC | Age: 63
End: 2023-09-07
Payer: MEDICARE

## 2023-09-11 ENCOUNTER — RA CDI HCC (OUTPATIENT)
Dept: OTHER | Facility: HOSPITAL | Age: 63
End: 2023-09-11

## 2023-09-12 ENCOUNTER — OFFICE VISIT (OUTPATIENT)
Dept: PHYSICAL THERAPY | Facility: CLINIC | Age: 63
End: 2023-09-12
Payer: MEDICARE

## 2023-09-12 DIAGNOSIS — G89.29 CHRONIC LOW BACK PAIN, UNSPECIFIED BACK PAIN LATERALITY, UNSPECIFIED WHETHER SCIATICA PRESENT: ICD-10-CM

## 2023-09-12 DIAGNOSIS — M54.50 CHRONIC LOW BACK PAIN, UNSPECIFIED BACK PAIN LATERALITY, UNSPECIFIED WHETHER SCIATICA PRESENT: ICD-10-CM

## 2023-09-12 DIAGNOSIS — M25.512 LEFT SHOULDER PAIN, UNSPECIFIED CHRONICITY: Primary | ICD-10-CM

## 2023-09-12 DIAGNOSIS — M75.02 ADHESIVE CAPSULITIS OF LEFT SHOULDER: ICD-10-CM

## 2023-09-12 PROCEDURE — 97140 MANUAL THERAPY 1/> REGIONS: CPT

## 2023-09-12 PROCEDURE — 97110 THERAPEUTIC EXERCISES: CPT

## 2023-09-12 NOTE — PROGRESS NOTES
Daily Note     Today's date: 2023  Patient name: Aline Hampton  : 1960  MRN: [id number]  Referring provider: Elzbieta Romero  Dx:   Encounter Diagnosis     ICD-10-CM    1. Left shoulder pain, unspecified chronicity  M25.512       2. Adhesive capsulitis of left shoulder  M75.02       3. Chronic low back pain, unspecified back pain laterality, unspecified whether sciatica present  M54.50     G89.29                      Subjective: Patient reports her xray came in and she meets with the ordering provider today. Patient would like to trial traction today as she has had it in the past and it gives her some relief. Patient does have history of fusion however reports she has had light traction since then with no issues. Patient continues to have tightness in the glute. Patient reports she was on her feet a lot that other day and was able to tolerate it with less pain then previously. Objective: See treatment diary below      Assessment: Tolerated treatment well, improvements noted post mechanical traction. Patient will report back on post session response when she returns next visit. Addition of supine palloff press tolerated well with no increase in pain. Patient demonstrated fatigue post treatment, exhibited good technique with therapeutic exercises and would benefit from continued PT. Plan: Continue per plan of care.       Precautions:   Past Medical History:   Diagnosis Date   • Diverticulitis, colon    • Hypertension    • Hypothyroidism     h/o " borderline "  issues   • Liver cyst     last assessed 16   • Lyme disease    • Positive Anaplasma serology    • RA (rheumatoid arthritis) ([address])    • Seizure disorder in pregnancy ([address])     had 1 seizure x1  during pregnancy- none after   • Varicose vein of leg        SOC: 23  FOTO: 23 (LPB)  POC Expiration: 10/23/23  Daily Treatment Log:  Date 23     Visit # 6 RE shldr  7 8     Manual  10' 15' LAD bilat prone/supine  LL      Piri release bilat  LL      Mechanical traction    140LB max (60% body weight) tolerated well with slight improvement in pain levels      Ther Exer 30' 30' 25'     Pulleys          Cerv retrac        Bilat shldr ext cane 2x10        Table slide on pball 10x5"        Cane abd std  1x10        B ER AROM        Supine horz abd/add         Supine ER strech w/ cane         Supine AAROM cane flex         Supine AROM flex         SL shoulder abd         RFIsit  Reviewed  1x10 to start  1x10 to start      Hip flexor str off edge  20"x3 R/L 30"x3 R/L  30"x3 R/L      Std hip abd/ext        Fig 4 str  20"x3 L   P! W/ R   20" x3 R/L      LTR  1x10 R/L 1x10 R/L  5" 1x10 R/L      RFIL pball 1x10 1x10 5"  1x10 5"              HEP        Ther Activ                                                        NMReed 10'  10'     Rows tube Blue tube 2x10       Bilat shldr ext tube Blue tube 2x10        Clamshells    S/L 1x10 R/L      Paloff press    Supine w/ GTB 1x10 R/L                                              Modalities                                  Access Code: [email or web address]  URL: [email or web address]  Date: 08/31/2023  Prepared by: Maggie Jarquin    Exercises  - Seated Passive Cervical Retraction  - 3-5 x daily - 7 x weekly - 2 sets - 10 reps  - Standing Shoulder Extension AAROM with Dowel  - 3-5 x daily - 7 x weekly - 2 sets - 10 reps  - Seated Lumbar Flexion Stretch  - 3-5 x daily - 7 x weekly - 2 sets - 10 reps  - Standing Shoulder Abduction AAROM with Dowel  - 1 x daily - 7 x weekly - 2 sets - 10 reps - 5 hold  - Seated Bilateral Shoulder Flexion Towel Slide at Table Top  - 1 x daily - 7 x weekly - 2 sets - 10 reps - 5 hold  - Supine Lower Trunk Rotation  - 1 x daily - 7 x weekly - 10 reps - 5 hold  - Supine Double Knee to Chest  - 1 x daily - 7 x weekly - 10 reps - 5 hold  - Modified Pierre Stretch  - 1 x daily - 7 x weekly - 3 reps - 20 hold

## 2023-09-13 ENCOUNTER — OFFICE VISIT (OUTPATIENT)
Dept: FAMILY MEDICINE CLINIC | Facility: CLINIC | Age: 63
End: 2023-09-13
Payer: MEDICARE

## 2023-09-13 VITALS
SYSTOLIC BLOOD PRESSURE: 148 MMHG | WEIGHT: 244.4 LBS | TEMPERATURE: 98.6 F | DIASTOLIC BLOOD PRESSURE: 82 MMHG | HEART RATE: 66 BPM | BODY MASS INDEX: 38.28 KG/M2

## 2023-09-13 DIAGNOSIS — Z00.00 WELCOME TO MEDICARE PREVENTIVE VISIT: Primary | ICD-10-CM

## 2023-09-13 DIAGNOSIS — Z12.31 ENCOUNTER FOR SCREENING MAMMOGRAM FOR BREAST CANCER: ICD-10-CM

## 2023-09-13 DIAGNOSIS — I10 ESSENTIAL HYPERTENSION: ICD-10-CM

## 2023-09-13 DIAGNOSIS — Z13.31 ENCOUNTER FOR SCREENING FOR DEPRESSION: ICD-10-CM

## 2023-09-13 DIAGNOSIS — Z86.39 HISTORY OF HYPOTHYROIDISM: ICD-10-CM

## 2023-09-13 DIAGNOSIS — E78.5 DYSLIPIDEMIA: ICD-10-CM

## 2023-09-13 DIAGNOSIS — E66.9 OBESITY (BMI 35.0-39.9 WITHOUT COMORBIDITY): ICD-10-CM

## 2023-09-13 PROCEDURE — G0402 INITIAL PREVENTIVE EXAM: HCPCS | Performed by: FAMILY MEDICINE

## 2023-09-13 RX ORDER — HYDROCHLOROTHIAZIDE 25 MG/1
25 TABLET ORAL DAILY
Qty: 90 TABLET | Refills: 6 | Status: SHIPPED | OUTPATIENT
Start: 2023-09-13

## 2023-09-13 NOTE — PROGRESS NOTES
Assessment and Plan:     Problem List Items Addressed This Visit        Cardiovascular and Mediastinum    Essential hypertension    Relevant Medications    hydrochlorothiazide (HYDRODIURIL) 25 mg tablet       Other    Obesity (BMI 35.0-39.9 without comorbidity)    Dyslipidemia    Relevant Orders    Lipid Panel with Direct LDL reflex   Other Visit Diagnoses     Welcome to Medicare preventive visit    -  Primary    Encounter for screening mammogram for breast cancer        Relevant Orders    Mammo screening bilateral w 3d & cad    Encounter for immunization        Encounter for screening for osteoporosis        History of hypothyroidism        Relevant Orders    TSH, 3rd generation with Free T4 reflex    Encounter for screening for depression            BMI Counseling: Body mass index is 38.28 kg/m². The BMI is above normal. Nutrition recommendations include decreasing portion sizes, encouraging healthy choices of fruits and vegetables and moderation in carbohydrate intake. Exercise recommendations include moderate physical activity 150 minutes/week and exercising 3-5 times per week. Rationale for BMI follow-up plan is due to patient being overweight or obese. Depression Screening and Follow-up Plan: Patient was screened for depression during today's encounter. They screened negative with a PHQ-2 score of 0. Preventive health issues were discussed with patient, and age appropriate screening tests were ordered as noted in patient's After Visit Summary. Personalized health advice and appropriate referrals for health education or preventive services given if needed, as noted in patient's After Visit Summary. History of Present Illness:     Patient presents for a Medicare Wellness Visit    Miguel VallesUC F seen and examined in office for annual wellness visit.      Patient Care Team:  Natty Justin MD as PCP - General (Family Medicine)     Review of Systems:     Review of Systems   Constitutional: Negative for chills and fever. HENT: Negative for ear pain and sore throat. Eyes: Negative for pain and visual disturbance. Respiratory: Negative for cough and shortness of breath. Cardiovascular: Negative for chest pain and palpitations. Gastrointestinal: Negative for abdominal pain and vomiting. Genitourinary: Negative for dysuria and hematuria. Musculoskeletal: Negative for arthralgias and back pain. Skin: Negative for color change and rash. Neurological: Negative for seizures and syncope. All other systems reviewed and are negative.        Problem List:     Patient Active Problem List   Diagnosis   • Complete tear of left rotator cuff   • Essential hypertension   • EKG abnormality   • Complete tear of right rotator cuff   • Rupture long head biceps tendon, right, subsequent encounter   • Pre-operative cardiovascular examination   • Obesity (BMI 35.0-39.9 without comorbidity)   • History of bilateral knee replacement   • Dyslipidemia   • Sinus pressure   • Hypertrophy of inferior nasal turbinate      Past Medical and Surgical History:     Past Medical History:   Diagnosis Date   • Diverticulitis, colon    • Hypertension    • Hypothyroidism     h/o " borderline "  issues   • Liver cyst     last assessed 4/18/16   • Lyme disease    • Positive Anaplasma serology    • RA (rheumatoid arthritis) ([address])    • Seizure disorder in pregnancy ([address])     had 1 seizure x1 1986 during pregnancy- none after   • Varicose vein of leg      Past Surgical History:   Procedure Laterality Date   • BACK SURGERY      fusion/refusion of 2-8 vertebrae   • COLECTOMY      partial sigmoid, last assessed 4/18/16   • JOINT REPLACEMENT     • LAPAROSCOPY      with adnexectomy   • LIVER SURGERY     • AL SURGICAL ARTHROSCOPY LILLI W/CORACOACRM LIGM RLS Right 3/12/2020    Procedure: ARTHROSCOPY SHOULDER, BICEPS TENODESIS;  Surgeon: Jacques Harry MD;  Location: [facility];  Service: Orthopedics   • AL SURGICAL ARTHROSCOPY SHOULDER W/ROTATOR CUFF RPR Left 1/3/2019    Procedure: REPAIR ROTATOR CUFF  ARTHROSCOPIC, BICEPTS TENODESIS, SUBACROMIAL DECOMPRESION;  Surgeon: Myrna Fuentes MD;  Location: [facility];  Service: Orthopedics   • CO SURGICAL ARTHROSCOPY SHOULDER W/ROTATOR CUFF RPR Right 3/12/2020    Procedure: REPAIR ROTATOR CUFF  ARTHROSCOPIC WITH SUBACROMIAL DECOMPRESSION;  Surgeon: Myrna Fuentes MD;  Location: [facility];  Service: Orthopedics   • REPLACEMENT TOTAL KNEE Bilateral    • SINUS SURGERY     • TONSILLECTOMY     • TYMPANOSTOMY TUBE PLACEMENT        Family History:     Family History   Problem Relation Age of Onset   • Hypertension Mother    • Pancreatic cancer Mother 78   • Arthritis Father    • Hyperthyroidism Father    • Hypertension Father    • No Known Problems Sister    • No Known Problems Daughter    • No Known Problems Daughter    • No Known Problems Daughter    • No Known Problems Maternal Grandmother    • No Known Problems Maternal Grandfather    • No Known Problems Paternal Grandmother    • No Known Problems Paternal Grandfather    • No Known Problems Brother    • No Known Problems Maternal Aunt    • No Known Problems Maternal Uncle    • No Known Problems Paternal Aunt    • No Known Problems Paternal Uncle    • ADD / ADHD Neg Hx    • Anesthesia problems Neg Hx    • Cancer Neg Hx    • Clotting disorder Neg Hx    • Collagen disease Neg Hx    • Diabetes Neg Hx    • Dislocations Neg Hx    • Learning disabilities Neg Hx    • Neurological problems Neg Hx    • Osteoporosis Neg Hx    • Rheumatologic disease Neg Hx    • Scoliosis Neg Hx    • Vascular Disease Neg Hx       Social History:     Social History     Socioeconomic History   • Marital status: /Civil Union     Spouse name: None   • Number of children: None   • Years of education: None   • Highest education level: None   Occupational History   • None   Tobacco Use   • Smoking status: Never   • Smokeless tobacco: Never   Vaping Use   • Vaping Use: Never used   Substance and Sexual Activity   • Alcohol use: No   • Drug use: No   • Sexual activity: Yes     Partners: Male   Other Topics Concern   • None   Social History Narrative    Always uses seat belt     Social Determinants of Health     Financial Resource Strain: Low Risk  (9/9/2023)    Overall Financial Resource Strain (CARDIA)    • Difficulty of Paying Living Expenses: Not very hard   Food Insecurity: Not on file   Transportation Needs: No Transportation Needs (9/9/2023)    PRAPARE - Transportation    • Lack of Transportation (Medical): No    • Lack of Transportation (Non-Medical): No   Physical Activity: Not on file   Stress: Not on file   Social Connections: Not on file   Intimate Partner Violence: Not on file   Housing Stability: Not on file      Medications and Allergies:     Current Outpatient Medications   Medication Sig Dispense Refill   • amLODIPine (NORVASC) 5 mg tablet Take 1 tablet (5 mg total) by mouth daily 90 tablet 1   • atenolol (TENORMIN) 100 mg tablet Take 1 tablet (100 mg total) by mouth daily 90 tablet 3   • hydrochlorothiazide (HYDRODIURIL) 25 mg tablet Take 1 tablet (25 mg total) by mouth daily 90 tablet 6   • hydroxychloroquine (PLAQUENIL) 200 mg tablet Take 200 mg by mouth 2 (two) times a day with meals  0   • RESTASIS MULTIDOSE 0.05 % ophthalmic emulsion Administer to both eyes every 12 (twelve) hours       • Azelastine HCl 137 MCG/SPRAY SOLN instill 1 spray into each nostril twice a day as directed (Patient not taking: Reported on 5/3/2023) 30 mL 0   • fluticasone (FLONASE) 50 mcg/act nasal spray 2 sprays into each nostril daily (Patient not taking: Reported on 5/3/2023) 16 g 3   • traMADol (ULTRAM) 50 mg tablet Take 50 mg by mouth every 6 (six) hours as needed for moderate pain     • triamcinolone (KENALOG) 0.1 % cream APPLY TOPICALLY TO THE AFFECTED AREA TWICE DAILY (Patient not taking: Reported on 5/3/2023)       No current facility-administered medications for this visit. Allergies   Allergen Reactions   • Bee Pollen Other (See Comments)     headaches   • Pollen Extract Other (See Comments)     headaches      Immunizations:     Immunization History   Administered Date(s) Administered   • COVID-19 PFIZER VACCINE 0.3 ML IM 03/18/2021, 06/10/2021, 01/04/2022   • TD (adult) Preservative Free 06/11/2018      Health Maintenance:         Topic Date Due   • Colorectal Cancer Screening  Never done   • Breast Cancer Screening: Mammogram  07/05/2023   • HIV Screening  Completed   • Hepatitis C Screening  Completed         Topic Date Due   • COVID-19 Vaccine (4 - Pfizer series) 03/01/2022   • Pneumococcal Vaccine: 65+ Years (1 - PCV) Never done   • Influenza Vaccine (1) 09/01/2023      Medicare Screening Tests and Risk Assessments:         Health Risk Assessment:   Patient rates overall health as good. Patient feels that their physical health rating is slightly worse. Patient is satisfied with their life. Eyesight was rated as same. Hearing was rated as same. Patient feels that their emotional and mental health rating is same. Patients states they are never, rarely angry. Patient states they are sometimes unusually tired/fatigued. Pain experienced in the last 7 days has been some. Patient's pain rating has been 4/10. Patient states that she has experienced no weight loss or gain in last 6 months. Fall Risk Screening: In the past year, patient has experienced: no history of falling in past year      Urinary Incontinence Screening:   Patient has leaked urine accidently in the last six months. Home Safety:  Patient does not have trouble with stairs inside or outside of their home. Patient has working smoke alarms and has working carbon monoxide detector. Home safety hazards include: none. Nutrition:   Current diet is Regular. Medications:   Patient is currently taking over-the-counter supplements. OTC medications include: Magnesium, lysine, vitamin C, vitamin D.  Patient is able to manage medications. Activities of Daily Living (ADLs)/Instrumental Activities of Daily Living (IADLs):   Walk and transfer into and out of bed and chair?: Yes  Dress and groom yourself?: Yes    Bathe or shower yourself?: Yes    Feed yourself? Yes  Do your laundry/housekeeping?: Yes  Manage your money, pay your bills and track your expenses?: Yes  Make your own meals?: Yes    Do your own shopping?: Yes    Previous Hospitalizations:   Any hospitalizations or ED visits within the last 12 months?: No      Advance Care Planning:   Living will: No    Durable POA for healthcare: Yes    Advanced directive: No      PREVENTIVE SCREENINGS      Cardiovascular Screening:    General: Screening Current      Breast Cancer Screening:     General: Screening Current      Cervical Cancer Screening:    General: Screening Not Indicated      Lung Cancer Screening:     General: Screening Not Indicated      Hepatitis C Screening:    General: Screening Current    Screening, Brief Intervention, and Referral to Treatment (SBIRT)    Screening  Typical number of drinks in a day: 0  Typical number of drinks in a week: 0  Interpretation: Low risk drinking behavior. AUDIT-C Screenin) How often did you have a drink containing alcohol in the past year? never  2) How many drinks did you have on a typical day when you were drinking in the past year? 0  3) How often did you have 6 or more drinks on one occasion in the past year? never    AUDIT-C Score: 0  Interpretation: Score 0-2 (female): Negative screen for alcohol misuse    Single Item Drug Screening:  How often have you used an illegal drug (including marijuana) or a prescription medication for non-medical reasons in the past year? never    Single Item Drug Screen Score: 0  Interpretation: Negative screen for possible drug use disorder    Review of Current Opioid Use    Opioid Risk Tool (ORT) Interpretation: Complete Opioid Risk Tool (ORT)    No results found.      Physical Exam: /82 (BP Location: Left arm, Patient Position: Sitting, Cuff Size: Large)   Pulse 66   Temp 98.6 °F (37 °C) (Tympanic)   Wt 111 kg (244 lb 6.4 oz)   BMI 38.28 kg/m²     Physical Exam  Vitals and nursing note reviewed. Constitutional:       General: She is not in acute distress. Appearance: She is well-developed. HENT:      Head: Normocephalic and atraumatic. Eyes:      Conjunctiva/sclera: Conjunctivae normal.   Cardiovascular:      Rate and Rhythm: Normal rate and regular rhythm. Heart sounds: No murmur heard. Pulmonary:      Effort: Pulmonary effort is normal. No respiratory distress. Breath sounds: Normal breath sounds. Abdominal:      Palpations: Abdomen is soft. Tenderness: There is no abdominal tenderness. Musculoskeletal:         General: No swelling. Cervical back: Neck supple. Skin:     General: Skin is warm and dry. Capillary Refill: Capillary refill takes less than 2 seconds. Neurological:      Mental Status: She is alert.    Psychiatric:         Mood and Affect: Mood normal.          Jasvir Huff MD

## 2023-09-13 NOTE — PATIENT INSTRUCTIONS
Medicare Preventive Visit Patient Instructions  Thank you for completing your Welcome to Medicare Visit or Medicare Annual Wellness Visit today. Your next wellness visit will be due in one year (9/13/2024). The screening/preventive services that you may require over the next 5-10 years are detailed below. Some tests may not apply to you based off risk factors and/or age. Screening tests ordered at today's visit but not completed yet may show as past due. Also, please note that scanned in results may not display below. Preventive Screenings:  Service Recommendations Previous Testing/Comments   Colorectal Cancer Screening  * Colonoscopy    * Fecal Occult Blood Test (FOBT)/Fecal Immunochemical Test (FIT)  * Fecal DNA/Cologuard Test  * Flexible Sigmoidoscopy Age: 43-73 years old   Colonoscopy: every 10 years (may be performed more frequently if at higher risk)  OR  FOBT/FIT: every 1 year  OR  Cologuard: every 3 years  OR  Sigmoidoscopy: every 5 years  Screening may be recommended earlier than age 39 if at higher risk for colorectal cancer. Also, an individualized decision between you and your healthcare provider will decide whether screening between the ages of 77-80 would be appropriate. Colonoscopy: Not on file  FOBT/FIT: Not on file  Cologuard: Not on file  Sigmoidoscopy: Not on file          Breast Cancer Screening Age: 36 years old  Frequency: every 1-2 years  Not required if history of left and right mastectomy Mammogram: 07/05/2022    Screening Current   Cervical Cancer Screening Between the ages of 21-29, pap smear recommended once every 3 years. Between the ages of 32-69, can perform pap smear with HPV co-testing every 5 years.    Recommendations may differ for women with a history of total hysterectomy, cervical cancer, or abnormal pap smears in past. Pap Smear: Not on file    Screening Not Indicated   Hepatitis C Screening Once for adults born between 1945 and 1965  More frequently in patients at high risk for Hepatitis C Hep C Antibody: 11/29/2021    Screening Current   Diabetes Screening 1-2 times per year if you're at risk for diabetes or have pre-diabetes Fasting glucose: 93 mg/dL (12/7/2018)  A1C: 5.5 % (11/29/2021)      Cholesterol Screening Once every 5 years if you don't have a lipid disorder. May order more often based on risk factors. Lipid panel: 11/29/2021    Screening Current     Other Preventive Screenings Covered by Medicare:  1. Abdominal Aortic Aneurysm (AAA) Screening: covered once if your at risk. You're considered to be at risk if you have a family history of AAA. 2. Lung Cancer Screening: covers low dose CT scan once per year if you meet all of the following conditions: (1) Age 48-67; (2) No signs or symptoms of lung cancer; (3) Current smoker or have quit smoking within the last 15 years; (4) You have a tobacco smoking history of at least 20 pack years (packs per day multiplied by number of years you smoked); (5) You get a written order from a healthcare provider. 3. Glaucoma Screening: covered annually if you're considered high risk: (1) You have diabetes OR (2) Family history of glaucoma OR (3)  aged 48 and older OR (3)  American aged 72 and older  3. Osteoporosis Screening: covered every 2 years if you meet one of the following conditions: (1) You're estrogen deficient and at risk for osteoporosis based off medical history and other findings; (2) Have a vertebral abnormality; (3) On glucocorticoid therapy for more than 3 months; (4) Have primary hyperparathyroidism; (5) On osteoporosis medications and need to assess response to drug therapy. · Last bone density test (DXA Scan): Not on file. 5. HIV Screening: covered annually if you're between the age of 14-79. Also covered annually if you are younger than 13 and older than 72 with risk factors for HIV infection. For pregnant patients, it is covered up to 3 times per pregnancy.     Immunizations:  Immunization Recommendations   Influenza Vaccine Annual influenza vaccination during flu season is recommended for all persons aged >= 6 months who do not have contraindications   Pneumococcal Vaccine   * Pneumococcal conjugate vaccine = PCV13 (Prevnar 13), PCV15 (Vaxneuvance), PCV20 (Prevnar 20)  * Pneumococcal polysaccharide vaccine = PPSV23 (Pneumovax) Adults 20-63 years old: 1-3 doses may be recommended based on certain risk factors  Adults 72 years old: 1-2 doses may be recommended based off what pneumonia vaccine you previously received   Hepatitis B Vaccine 3 dose series if at intermediate or high risk (ex: diabetes, end stage renal disease, liver disease)   Tetanus (Td) Vaccine - COST NOT COVERED BY MEDICARE PART B Following completion of primary series, a booster dose should be given every 10 years to maintain immunity against tetanus. Td may also be given as tetanus wound prophylaxis. Tdap Vaccine - COST NOT COVERED BY MEDICARE PART B Recommended at least once for all adults. For pregnant patients, recommended with each pregnancy. Shingles Vaccine (Shingrix) - COST NOT COVERED BY MEDICARE PART B  2 shot series recommended in those aged 48 and above     Health Maintenance Due:      Topic Date Due   • Colorectal Cancer Screening  Never done   • Breast Cancer Screening: Mammogram  07/05/2023   • HIV Screening  Completed   • Hepatitis C Screening  Completed     Immunizations Due:      Topic Date Due   • COVID-19 Vaccine (4 - Pfizer series) 03/01/2022   • Pneumococcal Vaccine: 65+ Years (1 - PCV) Never done   • Influenza Vaccine (1) 09/01/2023     Advance Directives   What are advance directives? Advance directives are legal documents that state your wishes and plans for medical care. These plans are made ahead of time in case you lose your ability to make decisions for yourself. Advance directives can apply to any medical decision, such as the treatments you want, and if you want to donate organs.    What are the types of advance directives? There are many types of advance directives, and each state has rules about how to use them. You may choose a combination of any of the following:  · Living will: This is a written record of the treatment you want. You can also choose which treatments you do not want, which to limit, and which to stop at a certain time. This includes surgery, medicine, IV fluid, and tube feedings. · Durable power of  for [facility]): This is a written record that states who you want to make healthcare choices for you when you are unable to make them for yourself. This person, called a proxy, is usually a family member or a friend. You may choose more than 1 proxy. · Do not resuscitate (DNR) order:  A DNR order is used in case your heart stops beating or you stop breathing. It is a request not to have certain forms of treatment, such as CPR. A DNR order may be included in other types of advance directives. · Medical directive: This covers the care that you want if you are in a coma, near death, or unable to make decisions for yourself. You can list the treatments you want for each condition. Treatment may include pain medicine, surgery, blood transfusions, dialysis, IV or tube feedings, and a ventilator (breathing machine). · Values history: This document has questions about your views, beliefs, and how you feel and think about life. This information can help others choose the care that you would choose. Why are advance directives important? An advance directive helps you control your care. Although spoken wishes may be used, it is better to have your wishes written down. Spoken wishes can be misunderstood, or not followed. Treatments may be given even if you do not want them. An advance directive may make it easier for your family to make difficult choices about your care. Urinary Incontinence   Urinary incontinence (UI)  is when you lose control of your bladder.  UI develops because your bladder cannot store or empty urine properly. The 3 most common types of UI are stress incontinence, urge incontinence, or both. Medicines:   · May be given to help strengthen your bladder control. Report any side effects of medication to your healthcare provider. Do pelvic muscle exercises often:  Your pelvic muscles help you stop urinating. Squeeze these muscles tight for 5 seconds, then relax for 5 seconds. Gradually work up to squeezing for 10 seconds. Do 3 sets of 15 repetitions a day, or as directed. This will help strengthen your pelvic muscles and improve bladder control. Train your bladder:  Go to the bathroom at set times, such as every 2 hours, even if you do not feel the urge to go. You can also try to hold your urine when you feel the urge to go. For example, hold your urine for 5 minutes when you feel the urge to go. As that becomes easier, hold your urine for 10 minutes. Self-care:   · Keep a UI record. Write down how often you leak urine and how much you leak. Make a note of what you were doing when you leaked urine. · Drink liquids as directed. You may need to limit the amount of liquid you drink to help control your urine leakage. Do not drink any liquid right before you go to bed. Limit or do not have drinks that contain caffeine or alcohol. · Prevent constipation. Eat a variety of high-fiber foods. Good examples are high-fiber cereals, beans, vegetables, and whole-grain breads. Walking is the best way to trigger your intestines to have a bowel movement. · Exercise regularly and maintain a healthy weight. Weight loss and exercise will decrease pressure on your bladder and help you control your leakage. · Use a catheter as directed  to help empty your bladder. A catheter is a tiny, plastic tube that is put into your bladder to drain your urine. · Go to behavior therapy as directed.   Behavior therapy may be used to help you learn to control your urge to urinate. Weight Management   Why it is important to manage your weight:  Being overweight increases your risk of health conditions such as heart disease, high blood pressure, type 2 diabetes, and certain types of cancer. It can also increase your risk for osteoarthritis, sleep apnea, and other respiratory problems. Aim for a slow, steady weight loss. Even a small amount of weight loss can lower your risk of health problems. How to lose weight safely:  A safe and healthy way to lose weight is to eat fewer calories and get regular exercise. You can lose up about 1 pound a week by decreasing the number of calories you eat by 500 calories each day. Healthy meal plan for weight management:  A healthy meal plan includes a variety of foods, contains fewer calories, and helps you stay healthy. A healthy meal plan includes the following:  · Eat whole-grain foods more often. A healthy meal plan should contain fiber. Fiber is the part of grains, fruits, and vegetables that is not broken down by your body. Whole-grain foods are healthy and provide extra fiber in your diet. Some examples of whole-grain foods are whole-wheat breads and pastas, oatmeal, brown rice, and bulgur. · Eat a variety of vegetables every day. Include dark, leafy greens such as spinach, kale, stephanie greens, and mustard greens. Eat yellow and orange vegetables such as carrots, sweet potatoes, and winter squash. · Eat a variety of fruits every day. Choose fresh or canned fruit (canned in its own juice or light syrup) instead of juice. Fruit juice has very little or no fiber. · Eat low-fat dairy foods. Drink fat-free (skim) milk or 1% milk. Eat fat-free yogurt and low-fat cottage cheese. Try low-fat cheeses such as mozzarella and other reduced-fat cheeses. · Choose meat and other protein foods that are low in fat. Choose beans or other legumes such as split peas or lentils.  Choose fish, skinless poultry (chicken or turkey), or lean cuts of red meat (beef or pork). Before you cook meat or poultry, cut off any visible fat. · Use less fat and oil. Try baking foods instead of frying them. Add less fat, such as margarine, sour cream, regular salad dressing and mayonnaise to foods. Eat fewer high-fat foods. Some examples of high-fat foods include french fries, doughnuts, ice cream, and cakes. · Eat fewer sweets. Limit foods and drinks that are high in sugar. This includes candy, cookies, regular soda, and sweetened drinks. Exercise:  Exercise at least 30 minutes per day on most days of the week. Some examples of exercise include walking, biking, dancing, and swimming. You can also fit in more physical activity by taking the stairs instead of the elevator or parking farther away from stores. Ask your healthcare provider about the best exercise plan for you. Narcotic (Opioid) Safety    Use narcotics safely:  · Take prescribed narcotics exactly as directed  · Do not give narcotics to others or take narcotics that belong to someone else  · Do not mix narcotics without medicines or alcohol  · Do not drive or operate heavy machinery after you take the narcotic  · Monitor for side effects and notify your healthcare provider if you experienced side effects such as nausea, sleepiness, itching, or trouble thinking clearly. Manage constipation:    Constipation is the most common side effect of narcotic medicine. Constipation is when you have hard, dry bowel movements, or you go longer than usual between bowel movements. Tell your healthcare provider about all changes in your bowel movements while you are taking narcotics. He or she may recommend laxative medicine to help you have a bowel movement. He or she may also change the kind of narcotic you are taking, or change when you take it. The following are more ways you can prevent or relieve constipation:    · Drink liquids as directed. You may need to drink extra liquids to help soften and move your bowels. Ask how much liquid to drink each day and which liquids are best for you. · Eat high-fiber foods. This may help decrease constipation by adding bulk to your bowel movements. High-fiber foods include fruits, vegetables, whole-grain breads and cereals, and beans. Your healthcare provider or dietitian can help you create a high-fiber meal plan. Your provider may also recommend a fiber supplement if you cannot get enough fiber from food. · Exercise regularly. Regular physical activity can help stimulate your intestines. Walking is a good exercise to prevent or relieve constipation. Ask which exercises are best for you. · Schedule a time each day to have a bowel movement. This may help train your body to have regular bowel movements. Bend forward while you are on the toilet to help move the bowel movement out. Sit on the toilet for at least 10 minutes, even if you do not have a bowel movement. Store narcotics safely:   · Store narcotics where others cannot easily get them. Keep them in a locked cabinet or secure area. Do not  keep them in a purse or other bag you carry with you. A person may be looking for something else and find the narcotics. · Make sure narcotics are stored out of the reach of children. A child can easily overdose on narcotics. Narcotics may look like candy to a small child. The best way to dispose of narcotics: The laws vary by country and area. In the [facility], the best way is to return the narcotics through a take-back program. This program is offered by the [employer] The following are options for using the program:  · Take the narcotics to a ALBA collection site. The site is often a law enforcement center. Call your local law enforcement center for scheduled take-back days in your area. You will be given information on where to go if the collection site is in a different location.   · Take the narcotics to an approved pharmacy or hospital.  A pharmacy or hospital may be set up as a collection site. You will need to ask if it is a ALBA collection site if you were not directed there. A pharmacy or doctor's office may not be able to take back narcotics unless it is a ALBA site. · Use a mail-back system. This means you are given containers to put the narcotics into. You will then mail them in the containers. · Use a take-back drop box. This is a place to leave the narcotics at any time. People and animals will not be able to get into the box. Your local law enforcement agency can tell you where to find a drop box in your area. Other ways to manage pain:   · Ask your healthcare provider about non-narcotic medicines to control pain. Nonprescription medicines include NSAIDs (such as ibuprofen) and acetaminophen. Prescription medicines include muscle relaxers, antidepressants, and steroids. · Pain may be managed without any medicines. Some ways to relieve pain include massage, aromatherapy, or meditation. Physical or occupational therapy may also help. For more information:   · Drug Enforcement Administration  [address] [facility] , [address] [facility]  Phone: 8- 641 - [phone]  Web Address: [email or web address]_disposal/    · [address] [facility] [address] and Drug Administration  [address] [facility] , [address] [facility] [address]  Phone: 2- 784 - [phone]  Web Address: http://My[employer]Place/     © Copyright [employer]. 2018 Information is for End User's use only and may not be sold, redistributed or otherwise used for commercial purposes.  All illustrations and images included in CareNotes® are the copyrighted property of A.D.A.M., Inc. or  [employer]

## 2023-09-14 ENCOUNTER — OFFICE VISIT (OUTPATIENT)
Dept: PHYSICAL THERAPY | Facility: CLINIC | Age: 63
End: 2023-09-14
Payer: MEDICARE

## 2023-09-14 DIAGNOSIS — M54.50 CHRONIC LOW BACK PAIN, UNSPECIFIED BACK PAIN LATERALITY, UNSPECIFIED WHETHER SCIATICA PRESENT: Primary | ICD-10-CM

## 2023-09-14 DIAGNOSIS — M75.02 ADHESIVE CAPSULITIS OF LEFT SHOULDER: ICD-10-CM

## 2023-09-14 DIAGNOSIS — G89.29 CHRONIC LOW BACK PAIN, UNSPECIFIED BACK PAIN LATERALITY, UNSPECIFIED WHETHER SCIATICA PRESENT: Primary | ICD-10-CM

## 2023-09-14 DIAGNOSIS — M25.512 LEFT SHOULDER PAIN, UNSPECIFIED CHRONICITY: ICD-10-CM

## 2023-09-14 PROCEDURE — 97112 NEUROMUSCULAR REEDUCATION: CPT

## 2023-09-14 PROCEDURE — 97110 THERAPEUTIC EXERCISES: CPT

## 2023-09-14 PROCEDURE — 97140 MANUAL THERAPY 1/> REGIONS: CPT

## 2023-09-14 NOTE — PROGRESS NOTES
Daily Note     Today's date: 2023  Patient name: Fransisco Shore  : 1960  MRN: [id number]  Referring provider: Sissy Seay  Dx:   Encounter Diagnosis     ICD-10-CM    1. Chronic low back pain, unspecified back pain laterality, unspecified whether sciatica present  M54.50     G89.29       2. Adhesive capsulitis of left shoulder  M75.02       3. Left shoulder pain, unspecified chronicity  M25.512           Start Time: 930  Stop Time: 1015  Total time in clinic (min): 45 minutes    Subjective: Pt presents to PT w/her "normal" amount of back pain that radiates to the R glute. She is leaving Saturday for [employer] and is admittedly apprehensive about the amount of time she will need to be seated during the flight. Objective: See treatment diary below    Assessment: Tolerated treatment well, improvements noted post l/s traction by PT rather than mechanical. Pt additionally reported lower pain levels at the end of session in both her l/s and R glute. Patient demonstrated fatigue post treatment, exhibited good technique with therapeutic exercises and would benefit from continued PT. Plan: Continue per plan of care. Precautions:   Past Medical History:   Diagnosis Date   • Diverticulitis, colon    • Hypertension    • Hypothyroidism     h/o " borderline "  issues   • Liver cyst     last assessed 16   • Lyme disease    • Positive Anaplasma serology    • RA (rheumatoid arthritis) ([address])    • Seizure disorder in pregnancy ([address])     had 1 seizure x1  during pregnancy- none after   • Varicose vein of leg      Treatment and documentation completed by Tamir Nuno SPT, under direct supervision and review of documentation completed by Cat Mclain DPT.     SOC: 23  FOTO: 23 (LPB)  POC Expiration: 10/23/23  Daily Treatment Log:  Date 23    Visit # 6 RE shldr  7 8 9    Manual  10' 15' 10'    LAD bilat prone/supine  LL  SJ    Piri release bilat LL  SJ    Mechanical traction    140LB max (60% body weight) tolerated well with slight improvement in pain levels      Ther Exer 30' 30' 25' 20'    Pulleys          Cerv retrac        Bilat shldr ext cane 2x10        Table slide on pball 10x5"        Cane abd std  1x10        B ER AROM        Supine horz abd/add         Supine ER strech w/ cane         Supine AAROM cane flex         Supine AROM flex         SL shoulder abd         RFIsit  Reviewed  1x10 to start  1x10 to start  1x10 to start    Hip flexor str off edge  20"x3 R/L 30"x3 R/L  30"x3 R/L  30"x3 R/L     Std hip abd/ext        Fig 4 str  20"x3 L   P! W/ R   20" x3 R/L      LTR  1x10 R/L 1x10 R/L  5" 1x10 R/L  5" 1x10 R/L    RFIL pball 1x10 1x10 5"  1x10 5"  1x10 5"             HEP        Ther Activ                                                        NMReed 10'  10' 15'    Rows tube Blue tube 2x10       Bilat shldr ext tube Blue tube 2x10        Clamshells    S/L 1x10 R/L  S/L 1x10 R/L, YTB    Paloff press    Supine w/ GTB 1x10 R/L  Supine w/ GTB 1x10 R/L     Side stepping     YTB, 3 laps at rail                                    Modalities                                  Access Code: [email or web address]  URL: [email or web address]  Date: 08/31/2023   Prepared by: Ed Ríos    Exercises  - Seated Passive Cervical Retraction  - 3-5 x daily - 7 x weekly - 2 sets - 10 reps  - Standing Shoulder Extension AAROM with Dowel  - 3-5 x daily - 7 x weekly - 2 sets - 10 reps  - Seated Lumbar Flexion Stretch  - 3-5 x daily - 7 x weekly - 2 sets - 10 reps  - Standing Shoulder Abduction AAROM with Dowel  - 1 x daily - 7 x weekly - 2 sets - 10 reps - 5 hold  - Seated Bilateral Shoulder Flexion Towel Slide at Table Top  - 1 x daily - 7 x weekly - 2 sets - 10 reps - 5 hold  - Supine Lower Trunk Rotation  - 1 x daily - 7 x weekly - 10 reps - 5 hold  - Supine Double Knee to Chest  - 1 x daily - 7 x weekly - 10 reps - 5 hold  - Modified Pierre Stretch  - 1 x daily - 7 x weekly - 3 reps - 20 hold

## 2023-09-28 ENCOUNTER — TELEPHONE (OUTPATIENT)
Dept: PHYSICAL THERAPY | Facility: CLINIC | Age: 63
End: 2023-09-28

## 2023-10-02 ENCOUNTER — APPOINTMENT (OUTPATIENT)
Dept: PHYSICAL THERAPY | Facility: CLINIC | Age: 63
End: 2023-10-02
Payer: MEDICARE

## 2023-10-05 ENCOUNTER — APPOINTMENT (OUTPATIENT)
Dept: PHYSICAL THERAPY | Facility: CLINIC | Age: 63
End: 2023-10-05
Payer: MEDICARE

## 2023-10-09 ENCOUNTER — EVALUATION (OUTPATIENT)
Dept: PHYSICAL THERAPY | Facility: CLINIC | Age: 63
End: 2023-10-09
Payer: MEDICARE

## 2023-10-09 DIAGNOSIS — M75.02 ADHESIVE CAPSULITIS OF LEFT SHOULDER: ICD-10-CM

## 2023-10-09 DIAGNOSIS — M25.512 LEFT SHOULDER PAIN, UNSPECIFIED CHRONICITY: ICD-10-CM

## 2023-10-09 DIAGNOSIS — G89.29 CHRONIC LOW BACK PAIN, UNSPECIFIED BACK PAIN LATERALITY, UNSPECIFIED WHETHER SCIATICA PRESENT: Primary | ICD-10-CM

## 2023-10-09 DIAGNOSIS — M54.50 CHRONIC LOW BACK PAIN, UNSPECIFIED BACK PAIN LATERALITY, UNSPECIFIED WHETHER SCIATICA PRESENT: Primary | ICD-10-CM

## 2023-10-09 PROCEDURE — 97164 PT RE-EVAL EST PLAN CARE: CPT

## 2023-10-09 PROCEDURE — 97112 NEUROMUSCULAR REEDUCATION: CPT

## 2023-10-09 PROCEDURE — 97110 THERAPEUTIC EXERCISES: CPT

## 2023-10-09 NOTE — PROGRESS NOTES
PT Re-Evaluation     Today's date: 10/9/2023  Patient name: Uday Calloway  : 1960  MRN: [id number]  Referring provider: Jonathon Hager*  Dx:   Encounter Diagnosis     ICD-10-CM    1. Chronic low back pain, unspecified back pain laterality, unspecified whether sciatica present  M54.50     G89.29       2. Adhesive capsulitis of left shoulder  M75.02       3. Left shoulder pain, unspecified chronicity  M25.512                      Assessment  Assessment details: Uday Calloway is a 70 y.o. female who is returns from several week break in therapy due to traveling and illness. She has been receiving OPPT for L shldr pain and LBP. At this time, she would like to hold her treatment for her L shldr due to + imaging found on her MRI and review scheduled w/ her orthopedic this week. She demonstrates improvements w/ regards to her LBP, w/ dec pain, inc ROM and flexibility and inc strength all leading to improvement in activity tolerance and functional mobility. She cont to have inc pain and limitation w/ walking/standing. Presents with cont pain, decreased strength, decreased ROM, impaired sensation, ambulatory dysfunction and postural dysfunction. Due to these impairments, patient has difficulty performing ADL's, recreational activities, engaging in social activities, ambulation, lifting/carrying. Patient's clinical presentation is consistent with their referring diagnosis of Chronic low back pain, unspecified back pain laterality, unspecified whether sciatica present  (primary encounter diagnosis). Patient w/ signs and symptoms consistent w/ lumbar dyusfunction w/ improvements made with repeated lumbar flexion in sitting. Patient has been educated in home exercise program and plan of care.  Patient would benefit from skilled physical therapy services to address their aforementioned functional limitations and progress towards prior level of function and independence with home exercise program. Her shoulder treatment will be placed on hold with POC cont to focus on LBP. Impairments: abnormal gait, abnormal or restricted ROM, activity intolerance, impaired physical strength, lacks appropriate home exercise program, pain with function, poor posture  and poor body mechanics    Goals  Short term goals to be accomplished in 4 weeks: (partially met)  STG 1: Pt will demo independence with postural management  STG 2: Pt will demo I with HEP to maximize progress between therapy sessions  STG 3: Pt will demo L/S AROM < or = min loss throughout to promote improved functional mobility and body mechanics  STG 4: Pt will demo 1/2 MMT grade core stabilizers to improve lumbar stability with functional challenges  STG 5: Pt will reports pain dec freq and intensity 50%  STG 6: Pt will deny sleep disturbance    Long term goals to be accomplished in 12 weeks: ( progressing towards)  LTG 1: Pt will demo good body mech with >75% functional challenges to prevent reinjury  LTG 2: Pt will be able to return to PLOF pain-free   LTG 3: Pt will demo Good strength core stabilizers to promote carryover with body mech and posture      Shoulder: Not assessed today   Short Term Goals to be accomplished in 4 weeks: (partially met)  STG1: Pt will be I with HEP to maximize progress between therapy sessions  STG2: Pt will be I with posture management   STG3: Pt will demo 50% incshoulder AROM to improve self care and household ADLs  STG4: Pt will demo 1/2 MMT strength in shoulder  STG5: Pt will report pain 2/10 in shoulder w/ movement     Long Term Goals to be accomplished in 12 weeks: (partially met)  LTG1: Pt will demo full shoulder AROM to improve functional mobility   LTG2: Pt will return to work/household ADLs pain free as per PLOF. LTG3: Pt will demo shoulder strength WNL to allow lifting/carrying.     Plan  Plan details: HEP development, stretching, strengthening, A/AA/PROM, joint mobilizations, posture education, STM/MI as needed to reduce muscle tension, muscle reeducation, PLOC discussed and agreed upon with patient. Patient would benefit from: PT eval and skilled physical therapy  Planned modality interventions: cryotherapy and thermotherapy: hydrocollator packs  Planned therapy interventions: manual therapy, neuromuscular re-education, self care, therapeutic activities, therapeutic exercise and home exercise program  Frequency: 2x week  Plan of Care beginning date: 8/28/2023  Plan of Care expiration date: 12/4/2023  Treatment plan discussed with: patient        Subjective Evaluation    History of Present Illness  Mechanism of injury: RE: Patient reports she was away for an almost two week bus tour and returns today. Patient reports she ankles and legs swelled a lot while she was there but hasn't noticed it since she returned. Reports that her pain has reduced and no longer has radiating symptoms. However she does cont to have local pain and reports numbness in her back her. MRI came back for her shoulder that shows some tears and she returned for follow up with orthopedic on 10/11/23 and she would like to hold on therapy for her shoulder. IE: Patients reports recent increase in low back pain over the past two months. Patient reports increase pain w/ activities including prolonged standing, prolonged sitting. Patient states she had to do a 2 hr drive last week and the next day she was in significant pain. Reports pain in bilateral feet on/off, notes soreness extending up into her back. Patient had L4-L5 fusion performed approx 10 years ago. Patient reports she is leaving for a trip to [facility] in several weeks and would like to get better in order to enjoy her trip. She is seeing chiro this week as well. Patient is currently treating for her shoulder.     Patient Goals  Patient goals for therapy: decreased pain, increased motion, independence with ADLs/IADLs, return to sport/leisure activities and increased strength    Pain  Current pain ratin  At best pain ratin  At worst pain ratin  Location: midline lumbar spine  Quality: dull ache and discomfort (nubmness )  Relieving factors: medications and change in position (tramadol, lying down )  Aggravating factors: walking, standing and sitting  Progression: improved      Diagnostic Tests  X-ray: abnormal (arthritis )  Treatments  Previous treatment: physical therapy and chiropractic (surgery )        Objective    Posture: Lumbar lordosis is dec in standing. There is nil lateral shift. Lumbar AROM limitations:  (*=  Pain)  Lumbar flexion: min-nill loss (tight)  Lumbar extension: min-nil   R side glide: min loss (tight) in the left hip    L side glide: nil loss    Mechanical Asessment: pre-test symtpoms include 4/10 localized midline lumbar pain  LUIS: Inc/W   Repeated Extension in Standing (TRUPTI): Inc/W   RFIS: Dec/B     Strength:  Core strength: Fair      Right  Left  Hip flexion:  4+/5  4+/5  Knee ext  4+/5  4+/5  Ankle DF  5/5  5/5  Knee flex  5/5  5/5    Hip abduction  4-/5  4-/5  Hip extension  4-/5  4-/5    Tenderness/Palpation:   + TTP bilateral lumbar paraspinals   + TTP bilat piriformis     Sensation:   + Intact t/o bilat LE's      Flexibility:   + Min-mod tightness bilat hip flexors/quads  + Min-mod tightness bilat hip external rotators     Function:   Pain w/ prolonged sitting, walking, standing. Pain reduced w/ support/ leaning forward on shopping cart. Shoulder Objective   Posture: rounded shoulders, forward head     AROM: standing R  L  Shoulder flex        150  125*  Shoulder abd       150  115*  ER @90  70  40*  Functional IR  T12   L4*   Functional ER  Back of head Back of head    PROM:  R  L  Shoulder flex        WNL  130*  Shoulder abd       WNL  120*  ER @ 90 abd  WNL  55*  IR @ 90 abd  WNL  60    Cervical AROM loss:   Mod loss t/o       MMT:    R  L  Shoulder flex  5/5  4+/5  Shoulder abd  5/5  4-/5*  IR   5/5  4+/5  ER   5/5  4+/5    Mechanical assessment:   No directional preference     Tenderness/Palpation:  + TTP supraspinatus tendon  + TTP LH biceps tendon  + TTP UT     Joint Mobility:   + Mod- min hypomobility inf/posterior capsule            Precautions:   Past Medical History:   Diagnosis Date   • Diverticulitis, colon    • Hypertension    • Hypothyroidism     h/o " borderline "  issues   • Liver cyst     last assessed 4/18/16   • Lyme disease    • Positive Anaplasma serology    • RA (rheumatoid arthritis) ([address])    • Seizure disorder in pregnancy ([address])     had 1 seizure x1 1986 during pregnancy- none after   • Varicose vein of leg       SOC: 7/31/23  FOTO: 9/28/23   POC Expiration: 12/4/23   Daily Treatment Log:   Date 8/31/23  9/5/23  9/12/2023 9/13/23 10/9/23    Visit # 6 RE shldr  7 8 9 10 RE    Manual  10' 15' 10'    LAD bilat prone/supine  LL  SJ    Piri release bilat  LL  SJ    Mechanical traction    140LB max (60% body weight) tolerated well with slight improvement in pain levels      Ther Exer 30' 30' 25' 20'    Pulleys          Cerv retrac        Bilat shldr ext cane 2x10        Table slide on pball 10x5"        Cane abd std  1x10        B ER AROM        Supine horz abd/add         Supine ER strech w/ cane         Supine AAROM cane flex         Supine AROM flex         SL shoulder abd         RFIsit  Reviewed  1x10 to start  1x10 to start  1x10 to start 1x10 to start    Hip flexor str off edge  20"x3 R/L 30"x3 R/L  30"x3 R/L  30"x3 R/L  30"x3 R/L   Std hip abd/ext        Fig 4 str  20"x3 L   P!  W/ R   20" x3 R/L      LTR  1x10 R/L 1x10 R/L  5" 1x10 R/L  5" 1x10 R/L 5" 1x10 R/L    RFIL pball 1x10 1x10 5"  1x10 5"  1x10 5"             HEP        Ther Activ                                                        NMReed 10'  10' 15'    Rows tube Blue tube 2x10       Bilat shldr ext tube Blue tube 2x10        Clamshells    S/L 1x10 R/L  S/L 1x10 R/L, YTB S/L YTB 1x10 R/L    Chuy press    Supine w/ GTB 1x10 R/L  Supine w/ GTB 1x10 R/L  Supine w/ GTB 1x10 R/L    Side stepping     YTB, 3 laps at rail                                    Modalities                                  Access Code: [email or web address]  URL: [email or web address]  Date: 08/31/2023   Prepared by: Maggie Jarquin    Exercises  - Seated Passive Cervical Retraction  - 3-5 x daily - 7 x weekly - 2 sets - 10 reps  - Standing Shoulder Extension AAROM with Dowel  - 3-5 x daily - 7 x weekly - 2 sets - 10 reps  - Seated Lumbar Flexion Stretch  - 3-5 x daily - 7 x weekly - 2 sets - 10 reps  - Standing Shoulder Abduction AAROM with Dowel  - 1 x daily - 7 x weekly - 2 sets - 10 reps - 5 hold  - Seated Bilateral Shoulder Flexion Towel Slide at Table Top  - 1 x daily - 7 x weekly - 2 sets - 10 reps - 5 hold  - Supine Lower Trunk Rotation  - 1 x daily - 7 x weekly - 10 reps - 5 hold  - Supine Double Knee to Chest  - 1 x daily - 7 x weekly - 10 reps - 5 hold  - Modified Pierre Stretch  - 1 x daily - 7 x weekly - 3 reps - 20 hold

## 2023-10-09 NOTE — LETTER
2023    Genna Islas MD  [address]  [address]  [address] [facility] [address]    Patient: Caryn Isidro   YOB: 1960   Date of Visit: 10/9/2023     Encounter Diagnosis     ICD-10-CM    1. Chronic low back pain, unspecified back pain laterality, unspecified whether sciatica present  M54.50     G89.29       2. Adhesive capsulitis of left shoulder  M75.02       3. Left shoulder pain, unspecified chronicity  M25.512           Dear Dr. Evonne Lopez: Thank you for your recent referral of Caryn Isidro. Please review the attached evaluation summary from [place]'s recent visit. Please verify that you agree with the plan of care by signing the attached order. If you have any questions or concerns, please do not hesitate to call. I sincerely appreciate the opportunity to share in the care of one of your patients and hope to have another opportunity to work with you in the near future. Sincerely,    Isaiah Chino, PT      Referring Provider:      I certify that I have read the below Plan of Care and certify the need for these services furnished under this plan of treatment while under my care. Genna Islas MD  [address] [place] [address]  Via Fax: [phone]          PT Re-Evaluation     Today's date: 10/9/2023  Patient name: Caryn Isidro  : 1960  MRN: [id number]  Referring provider: Mariam Cruz*  Dx:   Encounter Diagnosis     ICD-10-CM    1. Chronic low back pain, unspecified back pain laterality, unspecified whether sciatica present  M54.50     G89.29       2. Adhesive capsulitis of left shoulder  M75.02       3. Left shoulder pain, unspecified chronicity  M25.512                      Assessment  Assessment details: Caryn Isidro is a 70 y.o. female who is returns from several week break in therapy due to traveling and illness. She has been receiving OPPT for L shldr pain and LBP.  At this time, she would like to hold her treatment for her L shldr due to + imaging found on her MRI and review scheduled w/ her orthopedic this week. She demonstrates improvements w/ regards to her LBP, w/ dec pain, inc ROM and flexibility and inc strength all leading to improvement in activity tolerance and functional mobility. She cont to have inc pain and limitation w/ walking/standing. Presents with cont pain, decreased strength, decreased ROM, impaired sensation, ambulatory dysfunction and postural dysfunction. Due to these impairments, patient has difficulty performing ADL's, recreational activities, engaging in social activities, ambulation, lifting/carrying. Patient's clinical presentation is consistent with their referring diagnosis of Chronic low back pain, unspecified back pain laterality, unspecified whether sciatica present  (primary encounter diagnosis). Patient w/ signs and symptoms consistent w/ lumbar dyusfunction w/ improvements made with repeated lumbar flexion in sitting. Patient has been educated in home exercise program and plan of care. Patient would benefit from skilled physical therapy services to address their aforementioned functional limitations and progress towards prior level of function and independence with home exercise program. Her shoulder treatment will be placed on hold with POC cont to focus on LBP.      Impairments: abnormal gait, abnormal or restricted ROM, activity intolerance, impaired physical strength, lacks appropriate home exercise program, pain with function, poor posture  and poor body mechanics    Goals  Short term goals to be accomplished in 4 weeks: (partially met)  STG 1: Pt will demo independence with postural management  STG 2: Pt will demo I with HEP to maximize progress between therapy sessions  STG 3: Pt will demo L/S AROM < or = min loss throughout to promote improved functional mobility and body mechanics  STG 4: Pt will demo 1/2 MMT grade core stabilizers to improve lumbar stability with functional challenges  STG 5: Pt will reports pain dec freq and intensity 50%  STG 6: Pt will deny sleep disturbance    Long term goals to be accomplished in 12 weeks: ( progressing towards)  LTG 1: Pt will demo good body mech with >75% functional challenges to prevent reinjury  LTG 2: Pt will be able to return to PLOF pain-free   LTG 3: Pt will demo Good strength core stabilizers to promote carryover with body mech and posture      Shoulder: Not assessed today   Short Term Goals to be accomplished in 4 weeks: (partially met)  STG1: Pt will be I with HEP to maximize progress between therapy sessions  STG2: Pt will be I with posture management   STG3: Pt will demo 50% incshoulder AROM to improve self care and household ADLs  STG4: Pt will demo 1/2 MMT strength in shoulder  STG5: Pt will report pain 2/10 in shoulder w/ movement     Long Term Goals to be accomplished in 12 weeks: (partially met)  LTG1: Pt will demo full shoulder AROM to improve functional mobility   LTG2: Pt will return to work/household ADLs pain free as per PLOF. LTG3: Pt will demo shoulder strength WNL to allow lifting/carrying. Plan  Plan details: HEP development, stretching, strengthening, A/AA/PROM, joint mobilizations, posture education, STM/MI as needed to reduce muscle tension, muscle reeducation, PLOC discussed and agreed upon with patient.   Patient would benefit from: PT eval and skilled physical therapy  Planned modality interventions: cryotherapy and thermotherapy: hydrocollator packs  Planned therapy interventions: manual therapy, neuromuscular re-education, self care, therapeutic activities, therapeutic exercise and home exercise program  Frequency: 2x week  Plan of Care beginning date: 8/28/2023  Plan of Care expiration date: 12/4/2023  Treatment plan discussed with: patient        Subjective Evaluation    History of Present Illness  Mechanism of injury: RE: Patient reports she was away for an almost two week bus tour and returns today. Patient reports she ankles and legs swelled a lot while she was there but hasn't noticed it since she returned. Reports that her pain has reduced and no longer has radiating symptoms. However she does cont to have local pain and reports numbness in her back her. MRI came back for her shoulder that shows some tears and she returned for follow up with orthopedic on 10/11/23 and she would like to hold on therapy for her shoulder. IE: Patients reports recent increase in low back pain over the past two months. Patient reports increase pain w/ activities including prolonged standing, prolonged sitting. Patient states she had to do a 2 hr drive last week and the next day she was in significant pain. Reports pain in bilateral feet on/off, notes soreness extending up into her back. Patient had L4-L5 fusion performed approx 10 years ago. Patient reports she is leaving for a trip to [facility] in several weeks and would like to get better in order to enjoy her trip. She is seeing chiro this week as well. Patient is currently treating for her shoulder. Patient Goals  Patient goals for therapy: decreased pain, increased motion, independence with ADLs/IADLs, return to sport/leisure activities and increased strength    Pain  Current pain ratin  At best pain ratin  At worst pain ratin  Location: midline lumbar spine  Quality: dull ache and discomfort (nubmness )  Relieving factors: medications and change in position (tramadol, lying down )  Aggravating factors: walking, standing and sitting  Progression: improved      Diagnostic Tests  X-ray: abnormal (arthritis )  Treatments  Previous treatment: physical therapy and chiropractic (surgery )        Objective    Posture: Lumbar lordosis is dec in standing. There is nil lateral shift.      Lumbar AROM limitations:  (*=  Pain)  Lumbar flexion: min-nill loss (tight)  Lumbar extension: min-nil   R side glide: min loss (tight) in the left hip    L side glide: nil loss    Mechanical Asessment: pre-test symtpoms include 4/10 localized midline lumbar pain  LUIS: Inc/W   Repeated Extension in Standing (TRUPTI): Inc/W   RFIS: Dec/B     Strength:  Core strength: Fair      Right  Left  Hip flexion:  4+/5  4+/5  Knee ext  4+/5  4+/5  Ankle DF  5/5  5/5  Knee flex  5/5  5/5    Hip abduction  4-/5  4-/5  Hip extension  4-/5  4-/5    Tenderness/Palpation:   + TTP bilateral lumbar paraspinals   + TTP bilat piriformis     Sensation:   + Intact t/o bilat LE's      Flexibility:   + Min-mod tightness bilat hip flexors/quads  + Min-mod tightness bilat hip external rotators     Function:   Pain w/ prolonged sitting, walking, standing. Pain reduced w/ support/ leaning forward on shopping cart. Shoulder Objective   Posture: rounded shoulders, forward head     AROM: standing R  L  Shoulder flex        150  125*  Shoulder abd       150  115*  ER @90  70  40*  Functional IR  T12   L4*   Functional ER  Back of head Back of head    PROM:  R  L  Shoulder flex        WNL  130*  Shoulder abd       WNL  120*  ER @ 90 abd  WNL  55*  IR @ 90 abd  WNL  60    Cervical AROM loss:   Mod loss t/o       MMT:    R  L  Shoulder flex  5/5  4+/5  Shoulder abd  5/5  4-/5*  IR   5/5  4+/5  ER   5/5  4+/5    Mechanical assessment:   No directional preference     Tenderness/Palpation:  + TTP supraspinatus tendon  + TTP LH biceps tendon  + TTP UT     Joint Mobility:   + Mod- min hypomobility inf/posterior capsule           Precautions:   Past Medical History:   Diagnosis Date   • Diverticulitis, colon    • Hypertension    • Hypothyroidism     h/o " borderline "  issues   • Liver cyst     last assessed 4/18/16   • Lyme disease    • Positive Anaplasma serology    • RA (rheumatoid arthritis) ([address])    • Seizure disorder in pregnancy ([address])     had 1 seizure x1 1986 during pregnancy- none after   • Varicose vein of leg       SOC: 7/31/23  FOTO: 9/28/23   POC Expiration: 12/4/23 Daily Treatment Log:   Date 8/31/23  9/5/23  9/12/2023 9/13/23 10/9/23    Visit # 6 RE shldr  7 8 9 10 RE    Manual  10' 15' 10'    LAD bilat prone/supine  LL  SJ    Piri release bilat  LL  SJ    Mechanical traction    140LB max (60% body weight) tolerated well with slight improvement in pain levels      Ther Exer 30' 30' 25' 20'    Pulleys          Cerv retrac        Bilat shldr ext cane 2x10        Table slide on pball 10x5"        Cane abd std  1x10        B ER AROM        Supine horz abd/add         Supine ER strech w/ cane         Supine AAROM cane flex         Supine AROM flex         SL shoulder abd         RFIsit  Reviewed  1x10 to start  1x10 to start  1x10 to start 1x10 to start    Hip flexor str off edge  20"x3 R/L 30"x3 R/L  30"x3 R/L  30"x3 R/L  30"x3 R/L   Std hip abd/ext        Fig 4 str  20"x3 L   P! W/ R   20" x3 R/L      LTR  1x10 R/L 1x10 R/L  5" 1x10 R/L  5" 1x10 R/L 5" 1x10 R/L    RFIL pball 1x10 1x10 5"  1x10 5"  1x10 5"             HEP        Ther Activ                                                        NMReed 10'  10' 15'    Rows tube Blue tube 2x10       Bilat shldr ext tube Blue tube 2x10        Clamshells    S/L 1x10 R/L  S/L 1x10 R/L, YTB S/L YTB 1x10 R/L    Paloff press    Supine w/ GTB 1x10 R/L  Supine w/ GTB 1x10 R/L  Supine w/ GTB 1x10 R/L    Side stepping     YTB, 3 laps at rail                                    Modalities                                  Access Code: [email or web address]  URL: [email or web address]  Date: 08/31/2023   Prepared by: Pat Sole    Exercises  - Seated Passive Cervical Retraction  - 3-5 x daily - 7 x weekly - 2 sets - 10 reps  - Standing Shoulder Extension AAROM with Dowel  - 3-5 x daily - 7 x weekly - 2 sets - 10 reps  - Seated Lumbar Flexion Stretch  - 3-5 x daily - 7 x weekly - 2 sets - 10 reps  - Standing Shoulder Abduction AAROM with Dowel  - 1 x daily - 7 x weekly - 2 sets - 10 reps - 5 hold  - Seated Bilateral Shoulder Flexion Towel Slide at Table Top  - 1 x daily - 7 x weekly - 2 sets - 10 reps - 5 hold  - Supine Lower Trunk Rotation  - 1 x daily - 7 x weekly - 10 reps - 5 hold  - Supine Double Knee to Chest  - 1 x daily - 7 x weekly - 10 reps - 5 hold  - Modified Pierre Stretch  - 1 x daily - 7 x weekly - 3 reps - 20 hold

## 2023-10-12 ENCOUNTER — OFFICE VISIT (OUTPATIENT)
Dept: PHYSICAL THERAPY | Facility: CLINIC | Age: 63
End: 2023-10-12
Payer: MEDICARE

## 2023-10-12 DIAGNOSIS — M54.50 CHRONIC LOW BACK PAIN, UNSPECIFIED BACK PAIN LATERALITY, UNSPECIFIED WHETHER SCIATICA PRESENT: Primary | ICD-10-CM

## 2023-10-12 DIAGNOSIS — M75.02 ADHESIVE CAPSULITIS OF LEFT SHOULDER: ICD-10-CM

## 2023-10-12 DIAGNOSIS — G89.29 CHRONIC LOW BACK PAIN, UNSPECIFIED BACK PAIN LATERALITY, UNSPECIFIED WHETHER SCIATICA PRESENT: Primary | ICD-10-CM

## 2023-10-12 DIAGNOSIS — M25.512 LEFT SHOULDER PAIN, UNSPECIFIED CHRONICITY: ICD-10-CM

## 2023-10-12 PROCEDURE — 97110 THERAPEUTIC EXERCISES: CPT

## 2023-10-12 PROCEDURE — 97140 MANUAL THERAPY 1/> REGIONS: CPT

## 2023-10-12 NOTE — PROGRESS NOTES
Daily Note     Today's date: 10/12/2023  Patient name: Lobito Hairston  : 1960  MRN: [id number]  Referring provider: Carter Coy*  Dx:   Encounter Diagnosis     ICD-10-CM    1. Chronic low back pain, unspecified back pain laterality, unspecified whether sciatica present  M54.50     G89.29       2. Adhesive capsulitis of left shoulder  M75.02       3. Left shoulder pain, unspecified chronicity  M25.512                      Subjective: pt reports that she saw the shoulder MD and they believe that her pain is coming from her bicep and she is going to try an injection. Pt reports that her back is not feeling too good this morning, she put a big lidocaine patch on her back this morning which calmed it down a little bit. When she woke up 5/10, on arrival to session 3/10       Objective: See treatment diary below      Assessment: Tolerated treatment well. Pt dem cont soreness post session, no noted increases in pain during session, mild increase in soreness post session. Pt thinks today she is just inflamed. Cont to progress core and posterolateral hip strengthening with flex based program as tolerated. Patient exhibited good technique with therapeutic exercises      Plan: Continue per plan of care.       Precautions:   Past Medical History:   Diagnosis Date    Diverticulitis, colon     Hypertension     Hypothyroidism     h/o " borderline "  issues    Liver cyst     last assessed 16    Lyme disease     Positive Anaplasma serology     RA (rheumatoid arthritis) ([address])     Seizure disorder in pregnancy ([address])     had 1 seizure x1 1986 during pregnancy- none after    Varicose vein of leg       SOC: 23  FOTO: 23   POC Expiration: 23   Daily Treatment Log:   Date 10/12/2023    10/9/23    Visit # 11    10 RE    Manual 5'        LAD bilat prone/supine Supine R/L RR        Piri release bilat        Mechanical traction   140# max 60%BW        Ther Exer 28'               RFIsit  1x10 to start and end     1x10 to start    Hip flexor str off edge  30"x3 R/L     30"x3 R/L   Std hip abd/ext        Fig 4 str  20"x3 R/L        LTR  5"x10 R/L     5" 1x10 R/L    RFIL pball 5"x15                HEP        Ther Activ                                                        NMReed 5'        Clamshells  S/L YTB 1x10 R/L     S/L YTB 1x10 R/L    Paloff press      Supine w/ GTB 1x10 R/L    Side stepping  YTB                                       Modalities                                  Access Code: [email or web address]  URL: [email or web address]  Date: 08/31/2023   Prepared by: [employer]    Exercises  - Seated Passive Cervical Retraction  - 3-5 x daily - 7 x weekly - 2 sets - 10 reps  - Standing Shoulder Extension AAROM with Dowel  - 3-5 x daily - 7 x weekly - 2 sets - 10 reps  - Seated Lumbar Flexion Stretch  - 3-5 x daily - 7 x weekly - 2 sets - 10 reps  - Standing Shoulder Abduction AAROM with Dowel  - 1 x daily - 7 x weekly - 2 sets - 10 reps - 5 hold  - Seated Bilateral Shoulder Flexion Towel Slide at Table Top  - 1 x daily - 7 x weekly - 2 sets - 10 reps - 5 hold  - Supine Lower Trunk Rotation  - 1 x daily - 7 x weekly - 10 reps - 5 hold  - Supine Double Knee to Chest  - 1 x daily - 7 x weekly - 10 reps - 5 hold  - Modified Pierre Stretch  - 1 x daily - 7 x weekly - 3 reps - 20 hold

## 2023-10-13 ENCOUNTER — TELEPHONE (OUTPATIENT)
Dept: FAMILY MEDICINE CLINIC | Facility: CLINIC | Age: 63
End: 2023-10-13

## 2023-10-16 ENCOUNTER — OFFICE VISIT (OUTPATIENT)
Dept: URGENT CARE | Facility: CLINIC | Age: 63
End: 2023-10-16

## 2023-10-16 VITALS
OXYGEN SATURATION: 97 % | RESPIRATION RATE: 18 BRPM | SYSTOLIC BLOOD PRESSURE: 157 MMHG | DIASTOLIC BLOOD PRESSURE: 82 MMHG | TEMPERATURE: 96.9 F | HEART RATE: 55 BPM

## 2023-10-16 DIAGNOSIS — J30.1 SEASONAL ALLERGIC RHINITIS DUE TO POLLEN: Primary | ICD-10-CM

## 2023-10-16 RX ORDER — PREDNISONE 20 MG/1
TABLET ORAL
Qty: 15 TABLET | Refills: 0 | Status: SHIPPED | OUTPATIENT
Start: 2023-10-16

## 2023-10-16 NOTE — PROGRESS NOTES
[facility]        NAME: Annette Ray is a 70 y.o. female  : 1960    MRN: [id number]  DATE: 2023  TIME: 11:32 AM    Assessment and Plan   Seasonal allergic rhinitis due to pollen [J30.1]  1. Seasonal allergic rhinitis due to pollen  predniSONE 20 mg tablet        I suspect allergic rhinitis and symptoms from uncontrolled allergy. Due to the patient already being on an antihistamine and nasal spray, we will try a course of oral steroids. Patient Instructions     1. Continue Claritin and azelastine spray as discussed. 2.  Continue humidifier as discussed. 3.  Over-the-counter acetaminophen as needed for pain. 4.  Return here in 5 to 7 days for any unimproving symptoms. 5.  Return here or go to the ER immediately for any significantly worsening symptoms. Chief Complaint     Chief Complaint   Patient presents with    Sinusitis     Patient states 2 days ago she developed sinus headache with burning and stinging in nose and eye. States it continues to get worse. No other symptoms reported. Has been using OTC with no relief. History of Present Illness       40-year-old female patient with a 2 to 3-day history of persistent sinus pain and pressure. Patient states that the sinus pain is like a "burning."  Patient also states that she has itchy, burning eyes. She denies any nasal or eye drainage. No visual disturbances. No rhinorrhea. No sore throat. No congestion noted. Patient has been consistently taking her Claritin, azelastine spray with no relief. Patient also states that she runs a humidifier by her bed. Patient denies any fever or chills. No cough. No chest heaviness or difficulty breathing. Review of Systems   Review of Systems   Constitutional:  Negative for chills and fever. HENT:  Positive for sinus pressure and sinus pain. Negative for ear pain and sore throat. Eyes:  Positive for itching. Negative for pain and visual disturbance. Respiratory:  Negative for cough and shortness of breath. Cardiovascular:  Negative for chest pain and palpitations. Gastrointestinal:  Negative for abdominal pain and vomiting. Genitourinary:  Negative for dysuria and hematuria. Musculoskeletal:  Negative for arthralgias and back pain. Skin:  Negative for color change and rash. Neurological:  Positive for headaches. Negative for seizures and syncope. All other systems reviewed and are negative. Current Medications       Current Outpatient Medications:     amLODIPine (NORVASC) 5 mg tablet, Take 1 tablet (5 mg total) by mouth daily, Disp: 90 tablet, Rfl: 1    atenolol (TENORMIN) 100 mg tablet, Take 1 tablet (100 mg total) by mouth daily, Disp: 90 tablet, Rfl: 3    Azelastine HCl 137 MCG/SPRAY SOLN, instill 1 spray into each nostril twice a day as directed, Disp: 30 mL, Rfl: 0    fluticasone (FLONASE) 50 mcg/act nasal spray, 2 sprays into each nostril daily, Disp: 16 g, Rfl: 3    hydrochlorothiazide (HYDRODIURIL) 25 mg tablet, Take 1 tablet (25 mg total) by mouth daily, Disp: 90 tablet, Rfl: 6    hydroxychloroquine (PLAQUENIL) 200 mg tablet, Take 200 mg by mouth 2 (two) times a day with meals, Disp: , Rfl: 0    predniSONE 20 mg tablet, Take 3 tabs all at once daily for 3 days then 2 tabs for 2 days then 1 tab for 2 days. , Disp: 15 tablet, Rfl: 0    RESTASIS MULTIDOSE 0.05 % ophthalmic emulsion, Administer to both eyes every 12 (twelve) hours  , Disp: , Rfl:     traMADol (ULTRAM) 50 mg tablet, Take 50 mg by mouth every 6 (six) hours as needed for moderate pain, Disp: , Rfl:     Current Allergies     Allergies as of 10/16/2023 - Reviewed 10/16/2023   Allergen Reaction Noted    Bee pollen Other (See Comments) 03/29/2023    Pollen extract Other (See Comments)             The following portions of the patient's history were reviewed and updated as appropriate: allergies, current medications, past family history, past medical history, past social history, past surgical history and problem list.     Past Medical History:   Diagnosis Date    Diverticulitis, colon     Hypertension     Hypothyroidism     h/o " borderline "  issues    Liver cyst     last assessed 4/18/16    Lyme disease     Positive Anaplasma serology     RA (rheumatoid arthritis) ([address] [facility])     Seizure disorder in pregnancy ([address])     had 1 seizure x1 1986 during pregnancy- none after    Varicose vein of leg        Past Surgical History:   Procedure Laterality Date    BACK SURGERY      fusion/refusion of 2-8 vertebrae    COLECTOMY      partial sigmoid, last assessed 4/18/16    JOINT REPLACEMENT      LAPAROSCOPY      with adnexectomy    LIVER SURGERY      AL SURGICAL ARTHROSCOPY LILLI W/CORACOACRM LIGM RLS Right 3/12/2020    Procedure: ARTHROSCOPY SHOULDER, BICEPS TENODESIS;  Surgeon: Shira Wheatley MD;  Location: [facility];  Service: Orthopedics    AL SURGICAL ARTHROSCOPY SHOULDER W/ROTATOR CUFF RPR Left 1/3/2019    Procedure: REPAIR ROTATOR CUFF  ARTHROSCOPIC, BICEPTS TENODESIS, SUBACROMIAL DECOMPRESION;  Surgeon: Shira Wheatley MD;  Location: [facility];  Service: Orthopedics    AL SURGICAL ARTHROSCOPY SHOULDER W/ROTATOR CUFF RPR Right 3/12/2020    Procedure: REPAIR ROTATOR CUFF  ARTHROSCOPIC WITH SUBACROMIAL DECOMPRESSION;  Surgeon: Shira Whaetley MD;  Location: [facility];  Service: Orthopedics    REPLACEMENT TOTAL KNEE Bilateral     SINUS SURGERY      TONSILLECTOMY      TYMPANOSTOMY TUBE PLACEMENT         Family History   Problem Relation Age of Onset    Hypertension Mother     Pancreatic cancer Mother 78    Arthritis Father     Hyperthyroidism Father     Hypertension Father     No Known Problems Sister     No Known Problems Daughter     No Known Problems Daughter     No Known Problems Daughter     No Known Problems Maternal Grandmother     No Known Problems Maternal Grandfather     No Known Problems Paternal Grandmother     No Known Problems Paternal Grandfather     No Known Problems Brother     No Known Problems Maternal Aunt     No Known Problems Maternal Uncle     No Known Problems Paternal Aunt     No Known Problems Paternal Uncle     ADD / ADHD Neg Hx     Anesthesia problems Neg Hx     Cancer Neg Hx     Clotting disorder Neg Hx     Collagen disease Neg Hx     Diabetes Neg Hx     Dislocations Neg Hx     Learning disabilities Neg Hx     Neurological problems Neg Hx     Osteoporosis Neg Hx     Rheumatologic disease Neg Hx     Scoliosis Neg Hx     Vascular Disease Neg Hx          Medications have been verified. Objective   /82   Pulse 55   Temp (!) 96.9 °F (36.1 °C)   Resp 18   SpO2 97%        Physical Exam     Physical Exam  Constitutional:       General: She is not in acute distress. Appearance: Normal appearance. She is not ill-appearing. HENT:      Head: Normocephalic. Nose:      Comments: Examination of the internal nose reveals bilateral boggy turbinates. Mouth/Throat:      Mouth: Mucous membranes are moist.      Pharynx: Oropharynx is clear. Eyes:      Conjunctiva/sclera: Conjunctivae normal.      Pupils: Pupils are equal, round, and reactive to light. Cardiovascular:      Rate and Rhythm: Normal rate. Pulses: Normal pulses. Heart sounds: Normal heart sounds. Pulmonary:      Effort: Pulmonary effort is normal.   Abdominal:      Tenderness: There is no abdominal tenderness. Musculoskeletal:         General: Normal range of motion. Cervical back: Normal range of motion. Skin:     General: Skin is warm and dry. Capillary Refill: Capillary refill takes less than 2 seconds. Neurological:      Mental Status: She is alert and oriented to person, place, and time.    Psychiatric:         Mood and Affect: Mood normal.         Behavior: Behavior normal.

## 2023-10-16 NOTE — PATIENT INSTRUCTIONS
1.  Continue Claritin and azelastine spray as discussed. 2.  Continue humidifier as discussed. 3.  Over-the-counter acetaminophen as needed for pain. 4.  Return here in 5 to 7 days for any unimproving symptoms. 5.  Return here or go to the ER immediately for any significantly worsening symptoms.

## 2023-10-17 ENCOUNTER — OFFICE VISIT (OUTPATIENT)
Dept: PHYSICAL THERAPY | Facility: CLINIC | Age: 63
End: 2023-10-17
Payer: MEDICARE

## 2023-10-17 DIAGNOSIS — M54.50 CHRONIC LOW BACK PAIN, UNSPECIFIED BACK PAIN LATERALITY, UNSPECIFIED WHETHER SCIATICA PRESENT: Primary | ICD-10-CM

## 2023-10-17 DIAGNOSIS — G89.29 CHRONIC LOW BACK PAIN, UNSPECIFIED BACK PAIN LATERALITY, UNSPECIFIED WHETHER SCIATICA PRESENT: Primary | ICD-10-CM

## 2023-10-17 PROCEDURE — 97110 THERAPEUTIC EXERCISES: CPT

## 2023-10-17 PROCEDURE — 97112 NEUROMUSCULAR REEDUCATION: CPT

## 2023-10-17 NOTE — PROGRESS NOTES
Daily Note     Today's date: 10/17/2023  Patient name: Alba Wisdom  : 1960  MRN: [id number]  Referring provider: Klaudia Donovan  Dx:   Encounter Diagnosis     ICD-10-CM    1. Chronic low back pain, unspecified back pain laterality, unspecified whether sciatica present  M54.50     G89.29                      Subjective: pt reports that she is feeling better today than last visit. She did see the chiropractor this morning and he did an adjustment on her back and neck and this helped her headache. Objective: See treatment diary below      Assessment: Tolerated treatment well. Pt tolerated additional core strengthening exercises and TE today with no noted increases in LBP compared to previous visit. Cont to progress per symptom irritability. Patient exhibited good technique with therapeutic exercises      Plan: Continue per plan of care.       Precautions:   Past Medical History:   Diagnosis Date    Diverticulitis, colon     Hypertension     Hypothyroidism     h/o " borderline "  issues    Liver cyst     last assessed 16    Lyme disease     Positive Anaplasma serology     RA (rheumatoid arthritis) ([address]     Seizure disorder in pregnancy ([address])     had 1 seizure x1 1986 during pregnancy- none after    Varicose vein of leg       SOC: 23  FOTO: 23   POC Expiration: 23   Daily Treatment Log:   Date 10/12/2023 10/17/2023   10/9/23    Visit # 11 12    10 RE    Manual 5'        LAD bilat prone/supine Supine R/L RR  Supine R/L RR        Piri release bilat        Mechanical traction   140# max 60%BW        Ther Exer 28' 15'               RFIsit  1x10 to start and end  15x to end    1x10 to start    Hip flexor str off edge  30"x3 R/L  30"x3 R/L    30"x3 R/L   Std hip abd/ext  YTB @ knees 1x10 ea R/L       Fig 4 str  20"x3 R/L         LTR  5"x10 R/L  5"x10 R/L    5" 1x10 R/L    RFIL pball 5"x15  5"x10 Fwd/R/L       Bridges   2x10       HEP        Ther Activ NMReed 5'  25'       Clamshells  S/L YTB 1x10 R/L  SL YTB 2x10 R/L    S/L YTB 1x10 R/L    Paloff press   Dbl green tubing 1x10 R/L    Supine w/ GTB 1x10 R/L    B/L shoulder ext w/ core bracing   Red tubing 2x10       Uni row w/ rotation   Grn tubing 1x10 R/L       Side stepping  YTB                                       Modalities                                  Access Code: [email or web address]  URL: [email or web address]  Date: 08/31/2023   Prepared by: Ashley Oconnell    Exercises  - Seated Passive Cervical Retraction  - 3-5 x daily - 7 x weekly - 2 sets - 10 reps  - Standing Shoulder Extension AAROM with Dowel  - 3-5 x daily - 7 x weekly - 2 sets - 10 reps  - Seated Lumbar Flexion Stretch  - 3-5 x daily - 7 x weekly - 2 sets - 10 reps  - Standing Shoulder Abduction AAROM with Dowel  - 1 x daily - 7 x weekly - 2 sets - 10 reps - 5 hold  - Seated Bilateral Shoulder Flexion Towel Slide at Table Top  - 1 x daily - 7 x weekly - 2 sets - 10 reps - 5 hold  - Supine Lower Trunk Rotation  - 1 x daily - 7 x weekly - 10 reps - 5 hold  - Supine Double Knee to Chest  - 1 x daily - 7 x weekly - 10 reps - 5 hold  - Modified Pierre Stretch  - 1 x daily - 7 x weekly - 3 reps - 20 hold

## 2023-10-19 ENCOUNTER — OFFICE VISIT (OUTPATIENT)
Dept: PHYSICAL THERAPY | Facility: CLINIC | Age: 63
End: 2023-10-19
Payer: MEDICARE

## 2023-10-19 DIAGNOSIS — G89.29 CHRONIC LOW BACK PAIN, UNSPECIFIED BACK PAIN LATERALITY, UNSPECIFIED WHETHER SCIATICA PRESENT: Primary | ICD-10-CM

## 2023-10-19 DIAGNOSIS — M54.50 CHRONIC LOW BACK PAIN, UNSPECIFIED BACK PAIN LATERALITY, UNSPECIFIED WHETHER SCIATICA PRESENT: Primary | ICD-10-CM

## 2023-10-19 PROCEDURE — 97112 NEUROMUSCULAR REEDUCATION: CPT

## 2023-10-19 PROCEDURE — 97110 THERAPEUTIC EXERCISES: CPT

## 2023-10-19 NOTE — PROGRESS NOTES
Daily Note     Today's date: 10/19/2023  Patient name: Keli Shah  : 1960  MRN: [id number]  Referring provider: Fab Kingsley*  Dx:   Encounter Diagnosis     ICD-10-CM    1. Chronic low back pain, unspecified back pain laterality, unspecified whether sciatica present  M54.50     G89.29                      Subjective: pt reports she feels pretty good today and did like the exercises last session. Feels improvements in her strength and mobility. Objective: See treatment diary below      Assessment: Tolerated treatment well. Pt without inc in pain t/o session demonstrating a steady improvement w/ activity tolerance and dec sx's irritability. Patient exhibited good technique with therapeutic exercises and will prepare for discharge next week. Plan: Continue per plan of care. DC next week.      Precautions:   Past Medical History:   Diagnosis Date    Diverticulitis, colon     Hypertension     Hypothyroidism     h/o " borderline "  issues    Liver cyst     last assessed 16    Lyme disease     Positive Anaplasma serology     RA (rheumatoid arthritis) ([address])     Seizure disorder in pregnancy ([address])     had 1 seizure x1 1986 during pregnancy- none after    Varicose vein of leg       SOC: 23  FOTO: 23   POC Expiration: 23   Daily Treatment Log:   Date 10/12/2023 10/17/2023 10/19/23      Visit # 11 12  13     Manual 5'        LAD bilat prone/supine Supine R/L RR  Supine R/L RR        Piri release bilat        Mechanical traction   140# max 60%BW        Ther Exer 28' 15'  15'              RFIsit  1x10 to start and end  15x to end  15x to end      Hip flexor str off edge  30"x3 R/L  30"x3 R/L  30"x3 R/L      Std hip abd/ext  YTB @ knees 1x10 ea R/L  YTB @ knees 1x10 ea R/L     Fig 4 str  20"x3 R/L         LTR  5"x10 R/L  5"x10 R/L  5"x10 R/L      RFIL pball 5"x15  5"x10 Fwd/R/L  5"x10 fwrd/R/L      Bridges   2x10       HEP        Ther Activ NMReed 5'  25'  25'      Clamshells  S/L YTB 1x10 R/L  SL YTB 2x10 R/L  SL YTB 2x10 R/L     Paloff press   Dbl green tubing 1x10 R/L  Dlb grn 1x10 R/L      B/L shoulder ext w/ core bracing   Red tubing 2x10  Red tubing 2x10      Uni row w/ rotation   Grn tubing 1x10 R/L  Dbl grn 1x10 R/L     Side stepping  YTB                                       Modalities                                  Access Code: [email or web address]  URL: [email or web address]  Date: 08/31/2023   Prepared by: Skye Coonort    Exercises  - Seated Passive Cervical Retraction  - 3-5 x daily - 7 x weekly - 2 sets - 10 reps  - Standing Shoulder Extension AAROM with Dowel  - 3-5 x daily - 7 x weekly - 2 sets - 10 reps  - Seated Lumbar Flexion Stretch  - 3-5 x daily - 7 x weekly - 2 sets - 10 reps  - Standing Shoulder Abduction AAROM with Dowel  - 1 x daily - 7 x weekly - 2 sets - 10 reps - 5 hold  - Seated Bilateral Shoulder Flexion Towel Slide at Table Top  - 1 x daily - 7 x weekly - 2 sets - 10 reps - 5 hold  - Supine Lower Trunk Rotation  - 1 x daily - 7 x weekly - 10 reps - 5 hold  - Supine Double Knee to Chest  - 1 x daily - 7 x weekly - 10 reps - 5 hold  - Modified Pierre Stretch  - 1 x daily - 7 x weekly - 3 reps - 20 hold

## 2023-10-23 ENCOUNTER — TELEPHONE (OUTPATIENT)
Dept: PHYSICAL THERAPY | Facility: CLINIC | Age: 63
End: 2023-10-23

## 2023-10-23 ENCOUNTER — OFFICE VISIT (OUTPATIENT)
Dept: URGENT CARE | Facility: CLINIC | Age: 63
End: 2023-10-23
Payer: MEDICARE

## 2023-10-23 ENCOUNTER — APPOINTMENT (OUTPATIENT)
Dept: PHYSICAL THERAPY | Facility: CLINIC | Age: 63
End: 2023-10-23
Payer: MEDICARE

## 2023-10-23 VITALS
DIASTOLIC BLOOD PRESSURE: 89 MMHG | TEMPERATURE: 98.1 F | OXYGEN SATURATION: 98 % | HEART RATE: 67 BPM | SYSTOLIC BLOOD PRESSURE: 158 MMHG | RESPIRATION RATE: 18 BRPM

## 2023-10-23 DIAGNOSIS — J34.89 SINUS PRESSURE: Primary | ICD-10-CM

## 2023-10-23 PROCEDURE — 99213 OFFICE O/P EST LOW 20 MIN: CPT | Performed by: PHYSICIAN ASSISTANT

## 2023-10-23 RX ORDER — MONTELUKAST SODIUM 4 MG/1
4 TABLET, CHEWABLE ORAL
Qty: 14 TABLET | Refills: 0 | Status: SHIPPED | OUTPATIENT
Start: 2023-10-23 | End: 2023-11-06

## 2023-10-23 NOTE — TELEPHONE ENCOUNTER
Patient called to cancel appt for today due to increased pain. Scheduled for discharge next appt Thursday 10/26/23.

## 2023-10-23 NOTE — PROGRESS NOTES
[facility]        NAME: Uday Calloway is a 70 y.o. female  : 1960    MRN: [id number]  DATE: 2023  TIME: 2:50 PM    Assessment and Plan   Sinus pressure [J34.89]  1. Sinus pressure          Patient Instructions   Bilateral sinus pain/pressure  No signs of bacterial infection  Likely due to seasonal allergies  Recommend consult with ENT/allergist  Continue claritin/nasonex    Follow up with PCP in 3-5 days. Proceed to  ER if symptoms worsen. Chief Complaint     Chief Complaint   Patient presents with    Headache     Patient reports she continues to have a headache. Was here last Monday and prescribed Steroids, which helped the headache slightly. Continues to have headache. Has tried OTC medications that have not helped. Also having RA flareup. History of Present Illness       Jeanmarie Arias is a 78-year-old female who presents to clinic complaining of continued sinus pain and pressure x2 weeks. She was in the office last week. She was given a course of steroids and she states they only mildly times but pain and pressure bilaterally continue. She denies any fever, chills, nausea, vomiting, loss of taste or smell, recent travel, or exposure to anyone known COVID-positive. Review of Systems   Review of Systems   Constitutional:  Negative for chills and fever. HENT:  Positive for sinus pressure and sinus pain. Negative for congestion, ear pain, rhinorrhea and sore throat. Respiratory:  Negative for cough and shortness of breath. Gastrointestinal:  Negative for diarrhea, nausea and vomiting. Neurological:  Positive for headaches. Negative for dizziness.          Current Medications       Current Outpatient Medications:     amLODIPine (NORVASC) 5 mg tablet, Take 1 tablet (5 mg total) by mouth daily, Disp: 90 tablet, Rfl: 1    atenolol (TENORMIN) 100 mg tablet, Take 1 tablet (100 mg total) by mouth daily, Disp: 90 tablet, Rfl: 3    Azelastine HCl 137 MCG/SPRAY SOLN, instill 1 spray into each nostril twice a day as directed, Disp: 30 mL, Rfl: 0    fluticasone (FLONASE) 50 mcg/act nasal spray, 2 sprays into each nostril daily, Disp: 16 g, Rfl: 3    hydrochlorothiazide (HYDRODIURIL) 25 mg tablet, Take 1 tablet (25 mg total) by mouth daily, Disp: 90 tablet, Rfl: 6    hydroxychloroquine (PLAQUENIL) 200 mg tablet, Take 200 mg by mouth 2 (two) times a day with meals, Disp: , Rfl: 0    predniSONE 20 mg tablet, Take 3 tabs all at once daily for 3 days then 2 tabs for 2 days then 1 tab for 2 days. , Disp: 15 tablet, Rfl: 0    RESTASIS MULTIDOSE 0.05 % ophthalmic emulsion, Administer to both eyes every 12 (twelve) hours  , Disp: , Rfl:     traMADol (ULTRAM) 50 mg tablet, Take 50 mg by mouth every 6 (six) hours as needed for moderate pain, Disp: , Rfl:     Current Allergies     Allergies as of 10/23/2023 - Reviewed 10/23/2023   Allergen Reaction Noted    Bee pollen Other (See Comments) 03/29/2023    Pollen extract Other (See Comments)             The following portions of the patient's history were reviewed and updated as appropriate: allergies, current medications, past family history, past medical history, past social history, past surgical history and problem list.     Past Medical History:   Diagnosis Date    Diverticulitis, colon     Hypertension     Hypothyroidism     h/o " borderline "  issues    Liver cyst     last assessed 4/18/16    Lyme disease     Positive Anaplasma serology     RA (rheumatoid arthritis) ([facility])     Seizure disorder in pregnancy ([address] [facility])     had 1 seizure x1 1986 during pregnancy- none after    Varicose vein of leg        Past Surgical History:   Procedure Laterality Date    BACK SURGERY      fusion/refusion of 2-8 vertebrae    COLECTOMY      partial sigmoid, last assessed 4/18/16    JOINT REPLACEMENT      LAPAROSCOPY      with adnexectomy    LIVER SURGERY      KS SURGICAL ARTHROSCOPY LILLI W/CORACOACRM LIGM RLS Right 3/12/2020    Procedure: ARTHROSCOPY SHOULDER, BICEPS TENODESIS;  Surgeon: Cristy Anton MD;  Location: [facility];  Service: Orthopedics    ID SURGICAL ARTHROSCOPY SHOULDER W/ROTATOR CUFF RPR Left 1/3/2019    Procedure: REPAIR ROTATOR CUFF  ARTHROSCOPIC, BICEPTS TENODESIS, SUBACROMIAL DECOMPRESION;  Surgeon: Cristy Anton MD;  Location: [facility];  Service: Orthopedics    ID SURGICAL ARTHROSCOPY SHOULDER W/ROTATOR CUFF RPR Right 3/12/2020    Procedure: REPAIR ROTATOR CUFF  ARTHROSCOPIC WITH SUBACROMIAL DECOMPRESSION;  Surgeon: Cristy Anton MD;  Location: [facility];  Service: Orthopedics    REPLACEMENT TOTAL KNEE Bilateral     SINUS SURGERY      TONSILLECTOMY      TYMPANOSTOMY TUBE PLACEMENT         Family History   Problem Relation Age of Onset    Hypertension Mother     Pancreatic cancer Mother 78    Arthritis Father     Hyperthyroidism Father     Hypertension Father     No Known Problems Sister     No Known Problems Daughter     No Known Problems Daughter     No Known Problems Daughter     No Known Problems Maternal Grandmother     No Known Problems Maternal Grandfather     No Known Problems Paternal Grandmother     No Known Problems Paternal Grandfather     No Known Problems Brother     No Known Problems Maternal Aunt     No Known Problems Maternal Uncle     No Known Problems Paternal Aunt     No Known Problems Paternal Uncle     ADD / ADHD Neg Hx     Anesthesia problems Neg Hx     Cancer Neg Hx     Clotting disorder Neg Hx     Collagen disease Neg Hx     Diabetes Neg Hx     Dislocations Neg Hx     Learning disabilities Neg Hx     Neurological problems Neg Hx     Osteoporosis Neg Hx     Rheumatologic disease Neg Hx     Scoliosis Neg Hx     Vascular Disease Neg Hx          Medications have been verified. Objective   /89   Pulse 67   Temp 98.1 °F (36.7 °C)   Resp 18   SpO2 98%   No LMP recorded. Patient is postmenopausal.       Physical Exam     Physical Exam  Vitals and nursing note reviewed.    Constitutional:       General: She is not in acute distress. Appearance: Normal appearance. She is not ill-appearing. HENT:      Right Ear: Tympanic membrane, ear canal and external ear normal.      Left Ear: Tympanic membrane, ear canal and external ear normal.      Nose: Congestion present. Right Sinus: No maxillary sinus tenderness or frontal sinus tenderness. Left Sinus: No maxillary sinus tenderness or frontal sinus tenderness. Mouth/Throat:      Mouth: Mucous membranes are moist.      Pharynx: No oropharyngeal exudate or posterior oropharyngeal erythema. Cardiovascular:      Rate and Rhythm: Normal rate and regular rhythm. Heart sounds: Normal heart sounds. Pulmonary:      Effort: Pulmonary effort is normal.      Breath sounds: Normal breath sounds. Lymphadenopathy:      Cervical: No cervical adenopathy. Neurological:      Mental Status: She is alert and oriented to person, place, and time.    Psychiatric:         Mood and Affect: Mood normal.         Behavior: Behavior normal.

## 2023-10-26 ENCOUNTER — OFFICE VISIT (OUTPATIENT)
Dept: PHYSICAL THERAPY | Facility: CLINIC | Age: 63
End: 2023-10-26
Payer: MEDICARE

## 2023-10-26 DIAGNOSIS — G89.29 CHRONIC LOW BACK PAIN, UNSPECIFIED BACK PAIN LATERALITY, UNSPECIFIED WHETHER SCIATICA PRESENT: Primary | ICD-10-CM

## 2023-10-26 DIAGNOSIS — M54.50 CHRONIC LOW BACK PAIN, UNSPECIFIED BACK PAIN LATERALITY, UNSPECIFIED WHETHER SCIATICA PRESENT: Primary | ICD-10-CM

## 2023-10-26 PROCEDURE — 97110 THERAPEUTIC EXERCISES: CPT

## 2023-10-26 NOTE — PROGRESS NOTES
PT Discharge    Today's date: 10/26/2023  Patient name: Julius Mosquera  : 1960  MRN: [id number]  Referring provider: Jennifer Jaimes*  Dx:   Encounter Diagnosis     ICD-10-CM    1. Chronic low back pain, unspecified back pain laterality, unspecified whether sciatica present  M54.50     G89.29                      Assessment  Assessment details: Julius Mosquera is a 70 y.o. female who has been regularly participating in skilled therapy for LPB and demonstrates improvements inc dec pain, inc ROM and flexibility and inc strength all leading to improvement in activity tolerance and functional mobility. She cont to have inc pain and limitation w/ walking/standing but this have improved and she feels she is able to manage her remaining symptoms w/ her HEP. Patient w/ signs and symptoms remain consistent w/ lumbar dysfunction w/ improvements made with repeated lumbar flexion in sitting. Patient has been educated in home exercise program and plan of care. Patient will be dischrged to HEP to cont to manage her activity dependent back pain.        Goals  Short term goals to be accomplished in 4 weeks: (met)  STG 1: Pt will demo independence with postural management  STG 2: Pt will demo I with HEP to maximize progress between therapy sessions  STG 3: Pt will demo L/S AROM < or = min loss throughout to promote improved functional mobility and body mechanics  STG 4: Pt will demo 1/2 MMT grade core stabilizers to improve lumbar stability with functional challenges  STG 5: Pt will reports pain dec freq and intensity 50%  STG 6: Pt will deny sleep disturbance    Long term goals to be accomplished in 12 weeks: ( partially met)  LTG 1: Pt will demo good body mech with >75% functional challenges to prevent reinjury  LTG 2: Pt will be able to return to PLOF pain-free   LTG 3: Pt will demo Good strength core stabilizers to promote carryover with body mech and posture            Subjective Evaluation    History of Present Illness  Mechanism of injury: DC: Patient reports improvement w/ back pain, notes she feels she is now able to manage her remaining pain w/ her HEP. She will move forward w/ MRI of her back scheduled for sometime in November and plans on following up w/ her rheumatologist around the same time. RE: Patient reports she was away for an almost two week bus tour and returns today. Patient reports she ankles and legs swelled a lot while she was there but hasn't noticed it since she returned. Reports that her pain has reduced and no longer has radiating symptoms. However she does cont to have local pain and reports numbness in her back her. MRI came back for her shoulder that shows some tears and she returned for follow up with orthopedic on 10/11/23 and she would like to hold on therapy for her shoulder. IE: Patients reports recent increase in low back pain over the past two months. Patient reports increase pain w/ activities including prolonged standing, prolonged sitting. Patient states she had to do a 2 hr drive last week and the next day she was in significant pain. Reports pain in bilateral feet on/off, notes soreness extending up into her back. Patient had L4-L5 fusion performed approx 10 years ago. Patient reports she is leaving for a trip to [place] [facility] in several weeks and would like to get better in order to enjoy her trip. She is seeing chiro this week as well. Patient is currently treating for her shoulder.     Patient Goals  Patient goals for therapy: decreased pain, increased motion, independence with ADLs/IADLs, return to sport/leisure activities and increased strength    Pain  Current pain ratin  At best pain ratin  At worst pain ratin  Location: midline lumbar spine  Quality: dull ache and discomfort (nubmness )  Relieving factors: medications and change in position (tramadol, lying down )  Aggravating factors: walking, standing and sitting  Progression: improved      Diagnostic Tests  X-ray: abnormal (arthritis )  Treatments  Previous treatment: physical therapy and chiropractic (surgery)        Objective    Posture: Lumbar lordosis is dec in standing. There is nil lateral shift. Lumbar AROM limitations:  (*=  Pain)  Lumbar flexion: min-nil loss (tight)  Lumbar extension: min-nil loss  R side glide: min loss (tight) in the left hip    L side glide: nil loss    Mechanical Asessment:   RFIS: Dec/B     Strength:  Core strength: Fair      Right  Left  Hip flexion:  4+/5  4+/5  Knee ext  4+/5  4+/5  Ankle DF  5/5  5/5  Knee flex  5/5  5/5    Hip abduction  4+/5  4+/5  Hip extension  4+/5  4+/5    Tenderness/Palpation:   + TTP bilateral lumbar paraspinals -minimal     Sensation:   + Intact t/o bilat LE's      Flexibility:   + Min tightness bilat hip flexors/quads  + Min tightness bilat hip external rotators     Function:   Pain w/ prolonged sitting, walking, standing. Pain reduced w/ support/ leaning forward on shopping cart.             Precautions:   Past Medical History:   Diagnosis Date    Diverticulitis, colon     Hypertension     Hypothyroidism     h/o " borderline "  issues    Liver cyst     last assessed 4/18/16    Lyme disease     Positive Anaplasma serology     RA (rheumatoid arthritis) ([address])     Seizure disorder in pregnancy ([address])     had 1 seizure x1 1986 during pregnancy- none after    Varicose vein of leg       SOC: 7/31/23  FOTO: 9/28/23   POC Expiration: 12/4/23   Daily Treatment Log:   Date 10/12/2023 10/17/2023 10/19/23  10/26/23     Visit # 11 12  13 14 DC    Manual 5'        LAD bilat prone/supine Supine R/L RR  Supine R/L RR        Piri release bilat        Mechanical traction   140# max 60%BW        Ther Exer 28' 15'  15'              RFIsit  1x10 to start and end  15x to end  15x to end  15x to start    Hip flexor str off edge  30"x3 R/L  30"x3 R/L  30"x3 R/L  30"x3 R/L     Std hip abd/ext  YTB @ knees 1x10 ea R/L  YTB @ knees 1x10 ea R/L Fig 4 str  20"x3 R/L     30"x3 R/L    LTR  5"x10 R/L  5"x10 R/L  5"x10 R/L  5"x10 R/L     RFIL pball 5"x15  5"x10 Fwd/R/L  5"x10 fwrd/R/L      Bridges   2x10   2x10     HEP        Ther Activ                                                        NMReed 5'  25'  25'      Clamshells  S/L YTB 1x10 R/L  SL YTB 2x10 R/L  SL YTB 2x10 R/L S/L YTB 2x10 R/L    Paloff press   Dbl green tubing 1x10 R/L  Dlb grn 1x10 R/L      B/L shoulder ext w/ core bracing   Red tubing 2x10  Red tubing 2x10      Uni row w/ rotation   Grn tubing 1x10 R/L  Dbl grn 1x10 R/L     Side stepping  YTB                                       Modalities                                      Access Code: [email or web address]  URL: [email or web address]  Date: 10/26/2023  Prepared by: Seb Huitron    Exercises  - Seated Lumbar Flexion Stretch  - 3-5 x daily - 7 x weekly - 2 sets - 10 reps  - Supine Lower Trunk Rotation  - 1 x daily - 7 x weekly - 10 reps - 5 hold  - Modified Pierre Stretch  - 1 x daily - 7 x weekly - 3 reps - 20 hold  - Clamshell with Resistance  - 1 x daily - 7 x weekly - 2 sets - 10 reps  - Supine Figure 4 Piriformis Stretch  - 1 x daily - 7 x weekly - 2 reps - 30 hold  - Supine Bridge  - 1 x daily - 7 x weekly - 1-2 sets - 10 reps  - Standing Hip Abduction with Resistance at Ankles and Counter Support  - 1 x daily - 7 x weekly - 2 sets - 10 reps  - Standing Hip Extension with Resistance at Ankles and Counter Support  - 1 x daily - 7 x weekly - 2 sets - 10 reps

## 2023-11-07 ENCOUNTER — TELEPHONE (OUTPATIENT)
Dept: NEUROSURGERY | Facility: CLINIC | Age: 63
End: 2023-11-07

## 2023-11-07 NOTE — TELEPHONE ENCOUNTER
Pt called in for self referral, previous  spinal surgery pt at [facility] in 2011 but no interventions since then with NS, just pt, chiropractor and pain management.   Pt would like to establish care here at [facility] ([facility]) since symptoms are returning

## 2023-11-13 ENCOUNTER — OFFICE VISIT (OUTPATIENT)
Dept: OTOLARYNGOLOGY | Facility: CLINIC | Age: 63
End: 2023-11-13
Payer: MEDICARE

## 2023-11-13 DIAGNOSIS — M06.9 RHEUMATOID ARTHRITIS INVOLVING MULTIPLE SITES, UNSPECIFIED WHETHER RHEUMATOID FACTOR PRESENT (HCC): ICD-10-CM

## 2023-11-13 DIAGNOSIS — J32.9 CHRONIC RHINOSINUSITIS: ICD-10-CM

## 2023-11-13 DIAGNOSIS — J34.89 SINUS PRESSURE: Primary | ICD-10-CM

## 2023-11-13 DIAGNOSIS — E66.01 OBESITY, MORBID (HCC): ICD-10-CM

## 2023-11-13 DIAGNOSIS — J34.3 HYPERTROPHY OF INFERIOR NASAL TURBINATE: ICD-10-CM

## 2023-11-13 PROCEDURE — 99214 OFFICE O/P EST MOD 30 MIN: CPT | Performed by: STUDENT IN AN ORGANIZED HEALTH CARE EDUCATION/TRAINING PROGRAM

## 2023-11-13 PROCEDURE — 31231 NASAL ENDOSCOPY DX: CPT | Performed by: STUDENT IN AN ORGANIZED HEALTH CARE EDUCATION/TRAINING PROGRAM

## 2023-11-13 RX ORDER — MONTELUKAST SODIUM 10 MG/1
10 TABLET ORAL
Qty: 30 TABLET | Refills: 3 | Status: SHIPPED | OUTPATIENT
Start: 2023-11-13 | End: 2023-12-13

## 2023-11-13 NOTE — PROGRESS NOTES
Otolaryngology-- Head and Neck Surgery Follow up visit      Follow up: She has a history of chronic sinusitis and has been experiencing sinus and facial pressure for the past two years. She sought care at an urgent care facility and was prescribed steroids, which provided relief from sinus pain but not the pressure. Currently, she is on Singulair and Claritin, in addition to using two nasal sprays. She does not report severe congestion or postnasal drip (PND). Notably, she has had ear tubes inserted 20 times and has undergone two sinus surgeries, with the most recent one being 15 years ago. She has not had recent allergy skin testing or a sinus CT scan. Review of any relevant imaging:      Interval Review of systems: Pertinent review of systems documented in the HPI. Interval Social History:  Social History     Socioeconomic History    Marital status: /Civil Union     Spouse name: Not on file    Number of children: Not on file    Years of education: Not on file    Highest education level: Not on file   Occupational History    Not on file   Tobacco Use    Smoking status: Never    Smokeless tobacco: Never   Vaping Use    Vaping Use: Never used   Substance and Sexual Activity    Alcohol use: No    Drug use: No    Sexual activity: Yes     Partners: Male     Birth control/protection: Female Sterilization   Other Topics Concern    Not on file   Social History Narrative    Always uses seat belt     Social Determinants of Health     Financial Resource Strain: Low Risk  (9/9/2023)    Overall Financial Resource Strain (CARDIA)     Difficulty of Paying Living Expenses: Not very hard   Food Insecurity: Not on file   Transportation Needs: No Transportation Needs (9/9/2023)    PRAPARE - Transportation     Lack of Transportation (Medical): No     Lack of Transportation (Non-Medical):  No   Physical Activity: Not on file   Stress: Not on file   Social Connections: Not on file   Intimate Partner Violence: Not on file   Housing Stability: Not on file       Interval Physical Examination:  There were no vitals taken for this visit. Head: Atraumatic, no visible scalp lesions, parotid and submandibular salivary glands non-tender to palpation and without masses bilaterally. Neck:  No visible or palpable cervical lesions or lymphadenopathy, thyroid gland is normal in size and symmetry and without masses, normal laryngeal elevation with swallowing. Ears:    Right ear :  Auricles normal in appearance, mastoid prominence non-tender, external auditory canal clear. Tympanic membrane intact. Left ear :  Auricles normal in appearance, mastoid prominence non-tender, external auditory canal clear. Tympanic membrane intact. Nose/Sinuses:  External appearance unremarkable, no maxillary or frontal sinus tenderness to palpation bilaterally. Nasal endoscopic examination showed deviated nasal septum, bilateral enlarged inferior turbinates, no polyps, thick clear mucus,post operative cheanges  Oral Cavity:  Moist mucus membranes, gums and dentition unremarkable, no oral mucosal masses or lesions, floor of mouth soft, tongue mobile without masses or lesions. Oropharynx:  Base of tongue soft and without masses, tonsils bilaterally unremarkable, soft palate mucosa unremarkable. Abdomen: Soft and lax  Extremities: No bruises   Eyes:  Extra-ocular movements intact, pupils equally round and reactive to light and accommodation, the lids and conjunctivae are normal in appearance. Constitutional:  Well developed, well nourished and groomed, in no acute distress. Cardiovascular:  Normal rate and rhythm, no palpable thrills, no jugulovenous distension observed. Respiratory:  Normal respiratory effort without evidence of retractions or use of accessory muscles. Neurologic:  Cranial nerves II-XII intact bilaterally. Psychiatric:  Alert and oriented to time, place and person.       Procedure:  Rigid nasal endoscopic examination:  The nasal cavities were decongested with lidocaine and oxymetazoline spray. Bilateral nasal endoscopy was performed as follows:  Endoscopy type: 0 degree rigid scope  Results: look above  The patient tolerated the procedure well. Assessment:  1. Sinus pressure  montelukast (SINGULAIR) 10 mg tablet      2. Hypertrophy of inferior nasal turbinate        3. Chronic rhinosinusitis  CT sinus wo contrast      4.  Rheumatoid arthritis involving multiple sites, unspecified whether rheumatoid factor present ([address])        5. Obesity, morbid ([address])            Plan:    CT scan ordered to further evaluate the sinuses

## 2023-11-14 ENCOUNTER — HOSPITAL ENCOUNTER (OUTPATIENT)
Dept: RADIOLOGY | Age: 63
Discharge: HOME | End: 2023-11-14
Payer: MEDICARE

## 2023-11-14 VITALS — HEIGHT: 67 IN | BODY MASS INDEX: 38.3 KG/M2 | WEIGHT: 244 LBS

## 2023-11-14 DIAGNOSIS — Z12.31 ENCOUNTER FOR SCREENING MAMMOGRAM FOR BREAST CANCER: ICD-10-CM

## 2023-11-14 PROCEDURE — 77067 SCR MAMMO BI INCL CAD: CPT

## 2023-11-14 PROCEDURE — 77063 BREAST TOMOSYNTHESIS BI: CPT

## 2023-11-15 ENCOUNTER — OFFICE VISIT (OUTPATIENT)
Dept: NEUROSURGERY | Facility: CLINIC | Age: 63
End: 2023-11-15
Payer: MEDICARE

## 2023-11-15 ENCOUNTER — APPOINTMENT (OUTPATIENT)
Dept: LAB | Facility: CLINIC | Age: 63
End: 2023-11-15
Payer: MEDICARE

## 2023-11-15 ENCOUNTER — HOSPITAL ENCOUNTER (OUTPATIENT)
Dept: RADIOLOGY | Facility: HOSPITAL | Age: 63
Discharge: HOME | End: 2023-11-15
Payer: MEDICARE

## 2023-11-15 VITALS
HEIGHT: 67 IN | OXYGEN SATURATION: 96 % | DIASTOLIC BLOOD PRESSURE: 86 MMHG | BODY MASS INDEX: 38.3 KG/M2 | TEMPERATURE: 97.5 F | WEIGHT: 244 LBS | SYSTOLIC BLOOD PRESSURE: 160 MMHG | RESPIRATION RATE: 16 BRPM | HEART RATE: 67 BPM

## 2023-11-15 DIAGNOSIS — M54.50 LUMBAGO: ICD-10-CM

## 2023-11-15 DIAGNOSIS — M54.16 LUMBAR RADICULOPATHY: Primary | ICD-10-CM

## 2023-11-15 DIAGNOSIS — M54.16 LUMBAR RADICULOPATHY: ICD-10-CM

## 2023-11-15 LAB
BUN SERPL-MCNC: 14 MG/DL (ref 5–25)
CREAT SERPL-MCNC: 0.77 MG/DL (ref 0.6–1.3)
GFR SERPL CREATININE-BSD FRML MDRD: 81 ML/MIN/1.73SQ M

## 2023-11-15 PROCEDURE — 84520 ASSAY OF UREA NITROGEN: CPT

## 2023-11-15 PROCEDURE — 36415 COLL VENOUS BLD VENIPUNCTURE: CPT

## 2023-11-15 PROCEDURE — 72110 X-RAY EXAM L-2 SPINE 4/>VWS: CPT

## 2023-11-15 PROCEDURE — 82565 ASSAY OF CREATININE: CPT

## 2023-11-15 PROCEDURE — 99204 OFFICE O/P NEW MOD 45 MIN: CPT | Performed by: PHYSICIAN ASSISTANT

## 2023-11-15 RX ORDER — MELOXICAM 15 MG/1
TABLET ORAL
COMMUNITY
Start: 2023-11-14

## 2023-11-15 NOTE — ASSESSMENT & PLAN NOTE
Patient presents today as a new patient to establish care and work-up bilateral leg symptoms  Patient with history of lumbar laminectomy followed by L4-S1 TLIF 1 year later, 2011, 2012 at [address] of several months of pressure/pain in the low back and inferior buttock described as a burning traveling into her thighs with no extension beyond the knee  Symptoms are exacerbated with activity. Now only has relief when lying flat. Completed physical therapy and chiropractics  Currently on tramadol and meloxicam prescribed for rheumatoid arthritis as well as over-the-counter medications such as Tylenol and Aleve/Advil. Completed steroids for other indication but did note improvement of her low back and leg symptoms. Imaging:   X-ray lumbar spine 8/31/2023: Prior fusion L4-S1 with TLIF. Query slight darkness around L4 screws. Hardware intact. Grade 1 anterolisthesis L3 on L4. Disc base narrowing at L2-3. Plan:   Continue to monitor neurological symptoms  Reviewed x-rays with patient demonstrating intact fusion. There is evidence of grade 1 after the thesis L3 on L4 unclear if this is new from prior. Discussed possible adjacent level disease as patient's thigh symptoms could correlate with an L3 radiculopathy. Ordered MRI lumbar spine with and without contrast in the setting of prior surgery and ongoing symptoms despite physical therapy and chiropractics. Given decreased standing and walking tolerance, evaluate for central canal stenosis consistent with neurogenic claudication. Ordered lumbar x-rays flexion-extension as well as supine to see if the listhesis reduces when lying down as her pain is better lying down. Referral placed to pain management for consideration of injections. Continue home exercises from physical therapy. Continue pain medications as prescribed and over-the-counter.   We will plan outpatient follow-up of this [facility] axial spine surgeon after completion of imaging. Patient will call the office with any questions or concerns.

## 2023-11-15 NOTE — PROGRESS NOTES
Neurosurgery Office Note  Yan Gonzalez 70 y.o. female MRN: [id number]      Assessment/Plan     Lumbar radiculopathy  Patient presents today as a new patient to establish care and work-up bilateral leg symptoms  Patient with history of lumbar laminectomy followed by L4-S1 TLIF 1 year later, 2011, 2012 at [address] of several months of pressure/pain in the low back and inferior buttock described as a burning traveling into her thighs with no extension beyond the knee  Symptoms are exacerbated with activity. Now only has relief when lying flat. Completed physical therapy and chiropractics  Currently on tramadol and meloxicam prescribed for rheumatoid arthritis as well as over-the-counter medications such as Tylenol and Aleve/Advil. Completed steroids for other indication but did note improvement of her low back and leg symptoms. Imaging:   X-ray lumbar spine 8/31/2023: Prior fusion L4-S1 with TLIF. Query slight darkness around L4 screws. Hardware intact. Grade 1 anterolisthesis L3 on L4. Disc base narrowing at L2-3. Plan:   Continue to monitor neurological symptoms  Reviewed x-rays with patient demonstrating intact fusion. There is evidence of grade 1 after the thesis L3 on L4 unclear if this is new from prior. Discussed possible adjacent level disease as patient's thigh symptoms could correlate with an L3 radiculopathy. Ordered MRI lumbar spine with and without contrast in the setting of prior surgery and ongoing symptoms despite physical therapy and chiropractics. Given decreased standing and walking tolerance, evaluate for central canal stenosis consistent with neurogenic claudication. Ordered lumbar x-rays flexion-extension as well as supine to see if the listhesis reduces when lying down as her pain is better lying down. Referral placed to pain management for consideration of injections. Continue home exercises from physical therapy.   Continue pain medications as prescribed and over-the-counter. We will plan outpatient follow-up of this [facility] axial spine surgeon after completion of imaging. Patient will call the office with any questions or concerns. Diagnoses and all orders for this visit:    Lumbar radiculopathy  -     MRI lumbar spine with and without contrast; Future  -     BUN/Creatinine Ratio; Future  -     XR spine lumbar minimum 4 views non injury; Future  -     Ambulatory referral to Spine & Pain Management; Future    Lumbago  -     MRI lumbar spine with and without contrast; Future  -     BUN/Creatinine Ratio; Future  -     XR spine lumbar minimum 4 views non injury; Future  -     Ambulatory referral to Spine & Pain Management; Future    Other orders  -     meloxicam (MOBIC) 15 mg tablet          I have spent a total time of 50 minutes on 11/15/23 in caring for this patient including Diagnostic results, Risks and benefits of tx options, Instructions for management, Patient and family education, Impressions, Documenting in the medical record, Reviewing / ordering tests, medicine, procedures  , and Obtaining or reviewing history  . CHIEF COMPLAINT    Chief Complaint   Patient presents with    Consult       HISTORY    History of Present Illness     70y.o. year old female     This is a 58-year-old female past medical history significant for lumbar stenosis status post TLIF L4-S1 2012 [facility] rheumatoid arthritis, hypertension and diverticulitis who presents today as a new patient to establish care and further evaluation for low back and leg symptoms. Patient describes significant back pain as well as nerve pain prior to her surgery. She states the pain she is currently having is different. Currently patient describes a pressure discomfort in her low back bilaterally with pain into her buttock focused to the top of her thigh posteriorly described as a burning sensation and includes bilateral thighs.   She is unclear of the exact distribution along her thigh but denies any discomfort or symptoms below her knee. Symptoms are exacerbated with activities including walking. Prior her symptoms would resolve if she sat but at this time her symptoms continue despite sitting and only resolved when lying flat. She states her standing and walking tolerance is worse. At times her legs feel weak. No bowel bladder complaints. Patient describes a prior pains, stinging, crushing sensation that traveled up her spine for which she completed physical therapy and chiropractics. This resolved the severe aspect of the pain and she now continues with a pressure discomfort. Of note patient was seen in urgent care twice for frontal headaches. She was started on steroids and had noted improvement both in her back and leg symptoms. Continues with headache and ongoing follow-up with CT imaging and ENT consult. See Discussion    REVIEW OF SYSTEMS    Review of Systems   Constitutional: Negative. HENT: Negative. Eyes: Negative. Respiratory: Negative. Cardiovascular: Negative. Gastrointestinal: Negative. Endocrine: Negative. Musculoskeletal:  Positive for back pain (lbp across wasit down into buttock to back of thighs to knees also in front of thighs with burning/stinging) and gait problem (with pain worse as activities increase). Has done PT within past 60 days   Allergic/Immunologic: Negative. Neurological:  Positive for weakness (bi/legs). Psychiatric/Behavioral: Negative. ROS obtained by MA. Reviewed. See HPI.      Meds/Allergies     Current Outpatient Medications   Medication Sig Dispense Refill    meloxicam (MOBIC) 15 mg tablet       traMADol (ULTRAM) 50 mg tablet Take 50 mg by mouth every 6 (six) hours as needed for moderate pain      amLODIPine (NORVASC) 5 mg tablet Take 1 tablet (5 mg total) by mouth daily 90 tablet 1    atenolol (TENORMIN) 100 mg tablet Take 1 tablet (100 mg total) by mouth daily 90 tablet 3    Azelastine HCl 137 MCG/SPRAY SOLN instill 1 spray into each nostril twice a day as directed 30 mL 0    fluticasone (FLONASE) 50 mcg/act nasal spray 2 sprays into each nostril daily 16 g 3    hydrochlorothiazide (HYDRODIURIL) 25 mg tablet Take 1 tablet (25 mg total) by mouth daily 90 tablet 6    hydroxychloroquine (PLAQUENIL) 200 mg tablet Take 200 mg by mouth 2 (two) times a day with meals  0    montelukast (SINGULAIR) 10 mg tablet Take 1 tablet (10 mg total) by mouth daily at bedtime 30 tablet 3    montelukast (SINGULAIR) 4 mg chewable tablet Chew 1 tablet (4 mg total) daily at bedtime for 14 days 14 tablet 0    RESTASIS MULTIDOSE 0.05 % ophthalmic emulsion Administer to both eyes every 12 (twelve) hours         No current facility-administered medications for this visit.        Allergies   Allergen Reactions    Pollen Extract Other (See Comments)     headaches       PAST HISTORY    Past Medical History:   Diagnosis Date    Allergic rhinitis     On amd off my whole life    Disease of thyroid gland     Years ago    Diverticulitis, colon     Ear problems     Started at age 1    Fatigue     GERD (gastroesophageal reflux disease) On and off    Headache(784.0)     HL (hearing loss)     Hypertension     Hypothyroidism     h/o " borderline "  issues    Liver cyst     last assessed 4/18/16    Lyme disease     Obesity     Long time    Otitis media     On and off since i was 2    Positive Anaplasma serology     RA (rheumatoid arthritis) (HCC)     Seizure disorder in pregnancy ([address])     had 1 seizure x1 1986 during pregnancy- none after    Seizures ([address]) 1986    Tonsillitis     As a child    Varicose vein of leg        Past Surgical History:   Procedure Laterality Date    BACK SURGERY      fusion/refusion of 2-8 vertebrae    COLECTOMY      partial sigmoid, last assessed 4/18/16    JOINT REPLACEMENT      LAPAROSCOPY      with adnexectomy    LIVER SURGERY      MI SURGICAL ARTHROSCOPY LILLI W/CORACOACRM LIGM RLS Right 3/12/2020    Procedure: ARTHROSCOPY SHOULDER, BICEPS TENODESIS;  Surgeon: Macey Adhikari MD;  Location: [facility];  Service: Orthopedics    IA SURGICAL ARTHROSCOPY SHOULDER W/ROTATOR CUFF RPR Left 1/3/2019    Procedure: REPAIR ROTATOR CUFF  ARTHROSCOPIC, BICEPTS TENODESIS, SUBACROMIAL DECOMPRESION;  Surgeon: Macey Adhikari MD;  Location: [facility];  Service: Orthopedics    IA SURGICAL ARTHROSCOPY SHOULDER W/ROTATOR CUFF RPR Right 3/12/2020    Procedure: REPAIR ROTATOR CUFF  ARTHROSCOPIC WITH SUBACROMIAL DECOMPRESSION;  Surgeon: Macey Adhikari MD;  Location: [facility];  Service: Orthopedics    REPLACEMENT TOTAL KNEE Bilateral     SINUS SURGERY      TONSILLECTOMY      TYMPANOSTOMY TUBE PLACEMENT         Social History     Tobacco Use    Smoking status: Never    Smokeless tobacco: Never   Vaping Use    Vaping Use: Never used   Substance Use Topics    Alcohol use: No    Drug use: No       Family History   Problem Relation Age of Onset    Hypertension Mother     Pancreatic cancer Mother 78    Cancer Mother         Pancreatic cancer    Arthritis Father     Hyperthyroidism Father     Hypertension Father     Rheum arthritis Father     No Known Problems Sister     No Known Problems Daughter     No Known Problems Daughter     No Known Problems Daughter     No Known Problems Maternal Grandmother     No Known Problems Maternal Grandfather     No Known Problems Paternal Grandmother     No Known Problems Paternal Grandfather     No Known Problems Brother     Rheum arthritis Brother     No Known Problems Maternal Aunt     No Known Problems Maternal Aunt     No Known Problems Paternal Aunt     No Known Problems Paternal Aunt     No Known Problems Paternal Uncle     ADD / ADHD Neg Hx     Anesthesia problems Neg Hx     Clotting disorder Neg Hx     Collagen disease Neg Hx     Diabetes Neg Hx     Dislocations Neg Hx     Learning disabilities Neg Hx     Neurological problems Neg Hx     Osteoporosis Neg Hx Rheumatologic disease Neg Hx     Scoliosis Neg Hx     Vascular Disease Neg Hx          Above history personally reviewed. EXAM    Vitals:Blood pressure 160/86, pulse 67, temperature 97.5 °F (36.4 °C), temperature source Temporal, resp. rate 16, height 5' 7" (1.702 m), weight 111 kg (244 lb), SpO2 96 %. ,Body mass index is 38.22 kg/m². Physical Exam  Constitutional:       General: She is not in acute distress. Appearance: Normal appearance. She is well-developed. She is not ill-appearing. HENT:      Head: Normocephalic and atraumatic. Right Ear: External ear normal.      Left Ear: External ear normal.      Nose: Nose normal.      Mouth/Throat:      Mouth: Mucous membranes are moist.   Eyes:      General: No scleral icterus. Right eye: No discharge. Left eye: No discharge. Conjunctiva/sclera: Conjunctivae normal.   Cardiovascular:      Rate and Rhythm: Normal rate. Pulmonary:      Effort: Pulmonary effort is normal. No respiratory distress. Musculoskeletal:         General: Tenderness (low lumbar) present. Skin:     General: Skin is warm and dry. Findings: No rash. Comments: Midline lumbar incision well healed   Neurological:      Mental Status: She is alert. Gait: Gait is intact. Psychiatric:         Mood and Affect: Mood normal.         Speech: Speech normal.         Behavior: Behavior normal.         Thought Content: Thought content normal.         Judgment: Judgment normal.         Neurologic Exam     Mental Status   Follows 3 step commands. Attention: normal. Concentration: normal.   Speech: speech is normal   Level of consciousness: alert  Knowledge: good. Normal comprehension. Cranial Nerves     CN III, IV, VI   Conjugate gaze: present    CN VII   Facial expression full, symmetric. CN VIII   Hearing: intact    Motor Exam     Strength   Strength 5/5 except as noted.  Left delt 4 - known 2/2 ortho injury     Sensory Exam   Light touch normal.     Gait, Coordination, and Reflexes     Gait  Gait: normal    Tremor   Resting tremor: absent  Intention tremor: absent  Action tremor: absent    Reflexes   Right Babcock: absent  Left Babcock: absent  Right ankle clonus: absent  Left ankle clonus: absent        MEDICAL DECISION MAKING    Imaging Studies:     Spine x-rays August 2023    I have personally reviewed pertinent reports.    and I have personally reviewed pertinent films in PACS

## 2023-11-17 ENCOUNTER — HOSPITAL ENCOUNTER (OUTPATIENT)
Dept: RADIOLOGY | Facility: HOSPITAL | Age: 63
Discharge: HOME | End: 2023-11-17
Attending: STUDENT IN AN ORGANIZED HEALTH CARE EDUCATION/TRAINING PROGRAM
Payer: MEDICARE

## 2023-11-17 DIAGNOSIS — J32.9 CHRONIC RHINOSINUSITIS: ICD-10-CM

## 2023-11-17 PROCEDURE — 70486 CT MAXILLOFACIAL W/O DYE: CPT

## 2023-11-17 PROCEDURE — G1004 CDSM NDSC: HCPCS

## 2023-12-11 ENCOUNTER — OFFICE VISIT (OUTPATIENT)
Dept: FAMILY MEDICINE CLINIC | Facility: CLINIC | Age: 63
End: 2023-12-11
Payer: MEDICARE

## 2023-12-11 VITALS
DIASTOLIC BLOOD PRESSURE: 74 MMHG | SYSTOLIC BLOOD PRESSURE: 142 MMHG | HEART RATE: 76 BPM | HEIGHT: 67 IN | WEIGHT: 248 LBS | BODY MASS INDEX: 38.92 KG/M2 | OXYGEN SATURATION: 98 %

## 2023-12-11 DIAGNOSIS — T78.40XA ALLERGY, INITIAL ENCOUNTER: ICD-10-CM

## 2023-12-11 DIAGNOSIS — Z87.19 HISTORY OF DIVERTICULOSIS: ICD-10-CM

## 2023-12-11 DIAGNOSIS — J34.3 HYPERTROPHY OF INFERIOR NASAL TURBINATE: ICD-10-CM

## 2023-12-11 DIAGNOSIS — Z12.11 SCREENING FOR COLON CANCER: ICD-10-CM

## 2023-12-11 DIAGNOSIS — J34.89 SINUS PRESSURE: ICD-10-CM

## 2023-12-11 DIAGNOSIS — I10 ESSENTIAL HYPERTENSION: Primary | ICD-10-CM

## 2023-12-11 PROCEDURE — 99213 OFFICE O/P EST LOW 20 MIN: CPT | Performed by: FAMILY MEDICINE

## 2023-12-11 RX ORDER — FLUTICASONE PROPIONATE 50 MCG
2 SPRAY, SUSPENSION (ML) NASAL DAILY
Qty: 16 G | Refills: 3 | Status: SHIPPED | OUTPATIENT
Start: 2023-12-11

## 2023-12-11 RX ORDER — LORATADINE 10 MG/1
10 TABLET ORAL DAILY
COMMUNITY

## 2023-12-11 RX ORDER — AZELASTINE HYDROCHLORIDE 137 UG/1
1 SPRAY, METERED NASAL 2 TIMES DAILY
Qty: 30 ML | Refills: 2 | Status: SHIPPED | OUTPATIENT
Start: 2023-12-11 | End: 2024-10-06

## 2023-12-11 NOTE — ASSESSMENT & PLAN NOTE
Reports concern for recent elevated BP readings during doctor visits. Keeps home log which show readings WNL. Currently on HCTZ 25mg daily, Amlodipine 5mg daily and Atenolol 100mg daily. Office BP of is 142/74 at goal as per [facility] guidelines.   Continue current regimen  Has follow-up appointment with cardio  Educated on appropriate lifestyle modifications

## 2023-12-11 NOTE — ASSESSMENT & PLAN NOTE
History of recurrent sinusitis s/p sinus surgery,reports daily recurrent sinus headaches that have gotten worse in last three months. Reports relief with OTC Tylenol and meloxicam. On PE, no signs of congestion or upper respiratory symptoms.  - Recent CT Sinus showing minimal right maxillary sinus disease status post sinus surgery.    Has follow-up appointment with ENT  Advised to continue Azelastine daily and tylenol as needed  Starting Flonase 50mcg/act nasal spray to be used as needed

## 2023-12-11 NOTE — PROGRESS NOTES
Name: Keli Shah      : 1960      MRN: [id number]  Encounter Provider: Lindy Zuluaga MD  Encounter Date: 2023   Encounter department: [address] [employer]     1. Essential hypertension  Assessment & Plan:  Reports concern for recent elevated BP readings during doctor visits. Keeps home log which show readings WNL. Currently on HCTZ 25mg daily, Amlodipine 5mg daily and Atenolol 100mg daily. Office BP of is 142/74 at goal as per [facility] guidelines. Continue current regimen  Has follow-up appointment with cardio  Educated on appropriate lifestyle modifications      2. Screening for colon cancer  -     Ambulatory Referral to Gastroenterology; Future    3. History of diverticulosis  Assessment & Plan:  S/P hemicolectomy over 10 years ago, completed at [facility]. Reports no issues with BM since surgery. F/u colonoascopy      4. Sinus pressure  -     Azelastine HCl 137 MCG/SPRAY SOLN; 1 spray by Each Nare route 2 (two) times a day for 600 doses As directed  -     fluticasone (FLONASE) 50 mcg/act nasal spray; 2 sprays into each nostril daily    5. Hypertrophy of inferior nasal turbinate  Assessment & Plan:  History of recurrent sinusitis s/p sinus surgery,reports daily recurrent sinus headaches that have gotten worse in last three months. Reports relief with OTC Tylenol and meloxicam. On PE, no signs of congestion or upper respiratory symptoms.  - Recent CT Sinus showing minimal right maxillary sinus disease status post sinus surgery. Has follow-up appointment with ENT  Advised to continue Azelastine daily and tylenol as needed  Starting Flonase 50mcg/act nasal spray to be used as needed    Orders:  -     Azelastine HCl 137 MCG/SPRAY SOLN; 1 spray by Each Nare route 2 (two) times a day for 600 doses As directed  -     fluticasone (FLONASE) 50 mcg/act nasal spray; 2 sprays into each nostril daily    6.  Allergy, initial encounter  -     fluticasone (FLONASE) 50 mcg/act nasal spray; 2 sprays into each nostril daily           Subjective      Rhae Bent 39RR F seen and examined in office for evaluation of headaches and home BP logs. Review of Systems   Constitutional:  Negative for chills and fever. HENT:  Negative for ear pain and sore throat. Eyes:  Negative for pain and visual disturbance. Respiratory:  Negative for cough and shortness of breath. Cardiovascular:  Negative for chest pain and palpitations. Gastrointestinal:  Negative for abdominal pain and vomiting. Genitourinary:  Negative for dysuria and hematuria. Musculoskeletal:  Negative for arthralgias and back pain. Skin:  Negative for color change and rash. Neurological:  Positive for headaches (sinus). Negative for seizures and syncope. All other systems reviewed and are negative.       Current Outpatient Medications on File Prior to Visit   Medication Sig    amLODIPine (NORVASC) 5 mg tablet Take 1 tablet (5 mg total) by mouth daily    atenolol (TENORMIN) 100 mg tablet Take 1 tablet (100 mg total) by mouth daily    hydrochlorothiazide (HYDRODIURIL) 25 mg tablet Take 1 tablet (25 mg total) by mouth daily    hydroxychloroquine (PLAQUENIL) 200 mg tablet Take 200 mg by mouth 2 (two) times a day with meals    loratadine (Claritin) 10 mg tablet Take 10 mg by mouth daily    meloxicam (MOBIC) 15 mg tablet     montelukast (SINGULAIR) 10 mg tablet Take 1 tablet (10 mg total) by mouth daily at bedtime    RESTASIS MULTIDOSE 0.05 % ophthalmic emulsion Administer to both eyes every 12 (twelve) hours      traMADol (ULTRAM) 50 mg tablet Take 50 mg by mouth every 6 (six) hours as needed for moderate pain    [DISCONTINUED] Azelastine HCl 137 MCG/SPRAY SOLN instill 1 spray into each nostril twice a day as directed    [DISCONTINUED] fluticasone (FLONASE) 50 mcg/act nasal spray 2 sprays into each nostril daily    [DISCONTINUED] montelukast (SINGULAIR) 4 mg chewable tablet Chew 1 tablet (4 mg total) daily at bedtime for 14 days       Objective     /74 (BP Location: Left arm, Patient Position: Sitting, Cuff Size: Large)   Pulse 76   Ht 5' 7" (1.702 m)   Wt 112 kg (248 lb)   SpO2 98%   BMI 38.84 kg/m²     Physical Exam  Constitutional:       Appearance: Normal appearance. She is normal weight. HENT:      Head: Normocephalic and atraumatic. Right Ear: Tympanic membrane normal.      Left Ear: Tympanic membrane normal.      Nose: Nose normal. No congestion or rhinorrhea. Mouth/Throat:      Mouth: Mucous membranes are moist.      Pharynx: Oropharynx is clear. No oropharyngeal exudate or posterior oropharyngeal erythema. Eyes:      Extraocular Movements: Extraocular movements intact. Conjunctiva/sclera: Conjunctivae normal.      Pupils: Pupils are equal, round, and reactive to light. Cardiovascular:      Rate and Rhythm: Normal rate and regular rhythm. Pulses: Normal pulses. Heart sounds: Normal heart sounds. Pulmonary:      Effort: Pulmonary effort is normal.      Breath sounds: Normal breath sounds. Musculoskeletal:         General: Normal range of motion. Skin:     General: Skin is warm and dry. Neurological:      General: No focal deficit present. Mental Status: She is alert and oriented to person, place, and time.        Patricia Fairbanks MD

## 2023-12-11 NOTE — ASSESSMENT & PLAN NOTE
S/P hemicolectomy over 10 years ago, completed at [facility]. Reports no issues with BM since surgery.   F/u colonoascopy

## 2023-12-15 ENCOUNTER — HOSPITAL ENCOUNTER (OUTPATIENT)
Dept: MRI IMAGING | Facility: HOSPITAL | Age: 63
End: 2023-12-15
Payer: MEDICARE

## 2023-12-15 DIAGNOSIS — M54.16 LUMBAR RADICULOPATHY: ICD-10-CM

## 2023-12-15 DIAGNOSIS — M54.50 LUMBAGO: ICD-10-CM

## 2023-12-15 PROCEDURE — G1004 CDSM NDSC: HCPCS

## 2023-12-15 PROCEDURE — 72158 MRI LUMBAR SPINE W/O & W/DYE: CPT

## 2023-12-15 PROCEDURE — A9585 GADOBUTROL INJECTION: HCPCS

## 2023-12-15 RX ORDER — GADOBUTROL 604.72 MG/ML
10 INJECTION INTRAVENOUS
Status: COMPLETED | OUTPATIENT
Start: 2023-12-15 | End: 2023-12-15

## 2023-12-15 RX ADMIN — GADOBUTROL 10 ML: 604.72 INJECTION INTRAVENOUS at 12:27

## 2023-12-19 ENCOUNTER — OFFICE VISIT (OUTPATIENT)
Dept: NEUROSURGERY | Facility: CLINIC | Age: 63
End: 2023-12-19
Payer: MEDICARE

## 2023-12-19 VITALS
RESPIRATION RATE: 16 BRPM | HEIGHT: 67 IN | TEMPERATURE: 98.1 F | DIASTOLIC BLOOD PRESSURE: 76 MMHG | WEIGHT: 248 LBS | HEART RATE: 56 BPM | BODY MASS INDEX: 38.92 KG/M2 | SYSTOLIC BLOOD PRESSURE: 122 MMHG | OXYGEN SATURATION: 97 %

## 2023-12-19 DIAGNOSIS — M54.16 LUMBAR RADICULOPATHY: Primary | ICD-10-CM

## 2023-12-19 PROCEDURE — 99214 OFFICE O/P EST MOD 30 MIN: CPT | Performed by: PHYSICIAN ASSISTANT

## 2023-12-19 NOTE — ASSESSMENT & PLAN NOTE
Patient presents today for follow up of bilateral leg symptoms and imaging review  Patient with history of lumbar laminectomy followed by L4-S1 TLIF 1 year later, 2011, 2012 at [facility]  Complains of several months of pressure/pain in the low back and inferior buttock described as a burning traveling into her thighs with no extension beyond the knee  Symptoms are exacerbated with activity.  Now only has relief when lying flat.  Completed physical therapy and chiropractics  Currently on tramadol and meloxicam prescribed for rheumatoid arthritis as well as over-the-counter medications such as Tylenol and Aleve/Advil.   Completed steroids for other indication but did note improvement of her low back and leg symptoms.    Imaging:   MRI lumbar spine w/wo, 12/15/23: Final read pending. Per my interpretation, grade 1 anterolisthesis of L3 on L4 with moderate canal stenosis  XR lumbar spine, 11/15/23: Stable grade 1 anterolisthesis L3 on L4. Stable posterior fusion with pedicle screw fixation L4-S1 with intervertebral spacers at L4-5 and L5-S1. Stable disc space narrowing L2-3 and L3-4    Plan:   Continue to monitor neurological symptoms  Reviewed MRI/XR with patient demonstrating intact fusion.  There is evidence of grade 1 anterothesis L3 on L4, increased since previous MRI lumbar spine in 2015 but stable compared to XR in August 2023. Discussed possible adjacent level disease as patient's thigh symptoms could correlate with an L3 radiculopathy.  Follow up as scheduled next month with pain management for consideration of injections.  Continue home exercises from physical therapy.  Continue pain medications as prescribed and over-the-counter  At this time recommend maximizing conservative treatment with injections and gabapentin/robaxin  Follow up as needed if symptoms do not improve with the above treatments to discuss surgical options

## 2023-12-19 NOTE — PROGRESS NOTES
Neurosurgery Office Note  Caty Dang 63 y.o. female MRN: [id number]      Assessment/Plan     Lumbar radiculopathy  Patient presents today for follow up of bilateral leg symptoms and imaging review  Patient with history of lumbar laminectomy followed by L4-S1 TLIF 1 year later, 2011, 2012 at [facility]  Complains of several months of pressure/pain in the low back and inferior buttock described as a burning traveling into her thighs with no extension beyond the knee  Symptoms are exacerbated with activity.  Now only has relief when lying flat.  Completed physical therapy and chiropractics  Currently on tramadol and meloxicam prescribed for rheumatoid arthritis as well as over-the-counter medications such as Tylenol and Aleve/Advil.   Completed steroids for other indication but did note improvement of her low back and leg symptoms.    Imaging:   MRI lumbar spine w/wo, 12/15/23: Final read pending. Per my interpretation, grade 1 anterolisthesis of L3 on L4 with moderate canal stenosis  XR lumbar spine, 11/15/23: Stable grade 1 anterolisthesis L3 on L4. Stable posterior fusion with pedicle screw fixation L4-S1 with intervertebral spacers at L4-5 and L5-S1. Stable disc space narrowing L2-3 and L3-4    Plan:   Continue to monitor neurological symptoms  Reviewed MRI/XR with patient demonstrating intact fusion.  There is evidence of grade 1 anterothesis L3 on L4, increased since previous MRI lumbar spine in 2015 but stable compared to XR in August 2023. Discussed possible adjacent level disease as patient's thigh symptoms could correlate with an L3 radiculopathy.  Follow up as scheduled next month with pain management for consideration of injections.  Continue home exercises from physical therapy.  Continue pain medications as prescribed and over-the-counter  At this time recommend maximizing conservative treatment with injections and gabapentin/robaxin  Follow up as needed if symptoms do not improve with the  above treatments to discuss surgical options       Diagnoses and all orders for this visit:    Lumbar radiculopathy          I have spent a total time of 25 minutes on 12/19/23 in caring for this patient including Diagnostic results, Prognosis, Risks and benefits of tx options, Instructions for management, Patient and family education, Importance of tx compliance, Risk factor reductions, Impressions, Counseling / Coordination of care, Documenting in the medical record, Reviewing / ordering tests, medicine, procedures  , Obtaining or reviewing history  , and Communicating with other healthcare professionals .      CHIEF COMPLAINT    Chief Complaint   Patient presents with    Follow-up       HISTORY    This is a 63-year-old female past medical history significant for lumbar stenosis status post TLIF L4-S1 2012 ([facility]) rheumatoid arthritis, hypertension and diverticulitis who presents today to review imaging for work up of worsening low back and leg symptoms.  Patient describes significant back pain as well as nerve pain prior to her surgery.  She states the pain she is currently having is different.  Currently patient describes a pressure discomfort in her low back bilaterally with pain into her buttock focused to the top of her thigh posteriorly described as a burning sensation and includes bilateral anterolateral thighs. Symptoms are exacerbated with activities including walking.  Previously her symptoms would resolve if she sat but at this time her symptoms continue despite sitting and only resolved when lying flat.  She states her standing and walking tolerance is worse.  At times her legs feel weak. The weakness is affecting her daily life as she is an avid walker and traveler. No bowel bladder complaints.  No falls. She does not use ambulatory assistive devices.    Patient describes a prior pains, stinging, crushing sensation that traveled up her spine for which she completed physical therapy and  chiropractics.  This resolved the severe aspect of the pain and she now continues with a pressure discomfort.    She has an appointment with pain management in a few weeks. She would like to avoid surgery if possible.         See Discussion    REVIEW OF SYSTEMS    Review of Systems   Constitutional: Negative.    HENT: Negative.     Eyes: Negative.    Respiratory: Negative.     Cardiovascular: Negative.    Gastrointestinal: Negative.    Endocrine: Negative.    Musculoskeletal:  Positive for back pain (lbp across wasit down into buttock to back of thighs to knees also in front of thighs with burning/stinging) and gait problem (with pain worse as activities increase).        FOLLOW UP SNPX AP/DR. LUCERO MRI L SPINE SL 12/15/23 WITH X RAYS 11/15/23    Lst physical therapy ov 10/19/23    H/o RA   Allergic/Immunologic: Negative.    Neurological:  Positive for weakness (bi/legs) and numbness (some b/l top of foot numbness- more burning).   Psychiatric/Behavioral: Negative.     All other systems reviewed and are negative.      ROS obtained by MA. Reviewed. See HPI.   ROS was personally reviewed and changes made as needed     Meds/Allergies     Current Outpatient Medications   Medication Sig Dispense Refill    amLODIPine (NORVASC) 5 mg tablet Take 1 tablet (5 mg total) by mouth daily 90 tablet 1    atenolol (TENORMIN) 100 mg tablet Take 1 tablet (100 mg total) by mouth daily 90 tablet 3    Azelastine HCl 137 MCG/SPRAY SOLN 1 spray by Each Nare route 2 (two) times a day for 600 doses As directed 30 mL 2    fluticasone (FLONASE) 50 mcg/act nasal spray 2 sprays into each nostril daily 16 g 3    hydrochlorothiazide (HYDRODIURIL) 25 mg tablet Take 1 tablet (25 mg total) by mouth daily 90 tablet 6    hydroxychloroquine (PLAQUENIL) 200 mg tablet Take 200 mg by mouth 2 (two) times a day with meals  0    loratadine (Claritin) 10 mg tablet Take 10 mg by mouth daily      meloxicam (MOBIC) 15 mg tablet       montelukast (SINGULAIR) 10 mg  "tablet Take 1 tablet (10 mg total) by mouth daily at bedtime 30 tablet 3    RESTASIS MULTIDOSE 0.05 % ophthalmic emulsion Administer to both eyes every 12 (twelve) hours        traMADol (ULTRAM) 50 mg tablet Take 50 mg by mouth every 6 (six) hours as needed for moderate pain       No current facility-administered medications for this visit.       Allergies   Allergen Reactions    Pollen Extract Other (See Comments)     headaches       PAST HISTORY    Past Medical History:   Diagnosis Date    Allergic rhinitis     On amd off my whole life    Disease of thyroid gland     Years ago    Diverticulitis, colon     Ear problems     Started at age 2    Fatigue     GERD (gastroesophageal reflux disease) On and off    Headache(784.0)     HL (hearing loss)     Hypertension     Hypothyroidism     h/o \" borderline \"  issues    Liver cyst     last assessed 4/18/16    Lyme disease     Obesity     Long time    Otitis media     On and off since i was 2    Positive Anaplasma serology     RA (rheumatoid arthritis) ([facility])     Seizure disorder in pregnancy (HCC)     had 1 seizure x1 1986 during pregnancy- none after    Seizures ([facility]) 1986    Tonsillitis     As a child    Varicose vein of leg        Past Surgical History:   Procedure Laterality Date    BACK SURGERY      fusion/refusion of 2-8 vertebrae    COLECTOMY      partial sigmoid, last assessed 4/18/16    JOINT REPLACEMENT      LAPAROSCOPY      with adnexectomy    LIVER SURGERY      AZ SURGICAL ARTHROSCOPY LILLI W/CORACOACRM LIGM RLS Right 3/12/2020    Procedure: ARTHROSCOPY SHOULDER, BICEPS TENODESIS;  Surgeon: Teddy Keen MD;  Location: [facility];  Service: Orthopedics    AZ SURGICAL ARTHROSCOPY SHOULDER W/ROTATOR CUFF RPR Left 1/3/2019    Procedure: REPAIR ROTATOR CUFF  ARTHROSCOPIC, BICEPTS TENODESIS, SUBACROMIAL DECOMPRESION;  Surgeon: Teddy Keen MD;  Location: [facility];  Service: Orthopedics    AZ SURGICAL ARTHROSCOPY SHOULDER W/ROTATOR CUFF RPR Right 3/12/2020 " "   Procedure: REPAIR ROTATOR CUFF  ARTHROSCOPIC WITH SUBACROMIAL DECOMPRESSION;  Surgeon: Teddy Keen MD;  Location: [facility];  Service: Orthopedics    REPLACEMENT TOTAL KNEE Bilateral     SINUS SURGERY      TONSILLECTOMY      TYMPANOSTOMY TUBE PLACEMENT         Social History     Tobacco Use    Smoking status: Never    Smokeless tobacco: Never   Vaping Use    Vaping status: Never Used   Substance Use Topics    Alcohol use: No    Drug use: No       Family History   Problem Relation Age of Onset    Hypertension Mother     Pancreatic cancer Mother 79    Cancer Mother         Pancreatic cancer    Arthritis Father     Hyperthyroidism Father     Hypertension Father     Rheum arthritis Father     No Known Problems Sister     No Known Problems Daughter     No Known Problems Daughter     No Known Problems Daughter     No Known Problems Maternal Grandmother     No Known Problems Maternal Grandfather     No Known Problems Paternal Grandmother     No Known Problems Paternal Grandfather     No Known Problems Brother     Rheum arthritis Brother     No Known Problems Maternal Aunt     No Known Problems Maternal Aunt     No Known Problems Paternal Aunt     No Known Problems Paternal Aunt     No Known Problems Paternal Uncle     ADD / ADHD Neg Hx     Anesthesia problems Neg Hx     Clotting disorder Neg Hx     Collagen disease Neg Hx     Diabetes Neg Hx     Dislocations Neg Hx     Learning disabilities Neg Hx     Neurological problems Neg Hx     Osteoporosis Neg Hx     Rheumatologic disease Neg Hx     Scoliosis Neg Hx     Vascular Disease Neg Hx          Above history personally reviewed.       EXAM    Vitals:Blood pressure 122/76, pulse 56, temperature 98.1 °F (36.7 °C), temperature source Temporal, resp. rate 16, height 5' 7\" (1.702 m), weight 112 kg (248 lb), SpO2 97%.,Body mass index is 38.84 kg/m².     Physical Exam  Vitals and nursing note reviewed.   Constitutional:       Appearance: Normal appearance. She is " well-developed and normal weight.   HENT:      Head: Normocephalic and atraumatic.   Eyes:      Extraocular Movements: Extraocular movements intact.      Pupils: Pupils are equal, round, and reactive to light.   Cardiovascular:      Rate and Rhythm: Normal rate.   Pulmonary:      Effort: Pulmonary effort is normal. No respiratory distress.   Abdominal:      Palpations: Abdomen is soft.   Musculoskeletal:         General: Normal range of motion.      Cervical back: Normal range of motion.   Skin:     General: Skin is warm and dry.   Neurological:      Mental Status: She is alert and oriented to person, place, and time.      Cranial Nerves: Cranial nerves 2-12 are intact.      Motor: Motor strength is normal.  Psychiatric:         Mood and Affect: Mood normal.         Speech: Speech normal.         Behavior: Behavior normal.         Thought Content: Thought content normal.         Judgment: Judgment normal.         Neurologic Exam     Mental Status   Oriented to person, place, and time.   Follows 2 step commands.   Attention: normal. Concentration: normal.   Speech: speech is normal   Level of consciousness: alert  Knowledge: good.   Able to repeat. Normal comprehension.     Cranial Nerves   Cranial nerves II through XII intact.     CN III, IV, VI   Pupils are equal, round, and reactive to light.    Motor Exam   Muscle bulk: normal  Overall muscle tone: normal    Strength   Strength 5/5 throughout.     Sensory Exam   Light touch normal.     Gait, Coordination, and Reflexes     Gait  Gait: (antalgic gait)    Tremor   Resting tremor: absent        MEDICAL DECISION MAKING    Imaging Studies:     Imaging personally reviewed with attending and patient as above    I have personally reviewed pertinent reports.   and I have personally reviewed pertinent films in PACS

## 2024-01-08 DIAGNOSIS — I10 ESSENTIAL HYPERTENSION: ICD-10-CM

## 2024-01-08 RX ORDER — AMLODIPINE BESYLATE 5 MG/1
5 TABLET ORAL DAILY
Qty: 90 TABLET | Refills: 1 | Status: SHIPPED | OUTPATIENT
Start: 2024-01-08

## 2024-01-09 ENCOUNTER — CONSULT (OUTPATIENT)
Dept: PAIN MEDICINE | Facility: CLINIC | Age: 64
End: 2024-01-09
Payer: MEDICARE

## 2024-01-09 VITALS
WEIGHT: 245 LBS | DIASTOLIC BLOOD PRESSURE: 75 MMHG | HEART RATE: 75 BPM | BODY MASS INDEX: 38.37 KG/M2 | SYSTOLIC BLOOD PRESSURE: 148 MMHG

## 2024-01-09 DIAGNOSIS — Z98.1 HISTORY OF LUMBAR FUSION: ICD-10-CM

## 2024-01-09 DIAGNOSIS — M48.061 LUMBAR FORAMINAL STENOSIS: ICD-10-CM

## 2024-01-09 DIAGNOSIS — M54.16 LUMBAR RADICULOPATHY: Primary | ICD-10-CM

## 2024-01-09 PROCEDURE — 99204 OFFICE O/P NEW MOD 45 MIN: CPT | Performed by: PHYSICAL MEDICINE & REHABILITATION

## 2024-01-09 NOTE — H&P (VIEW-ONLY)
Pain Medicine Follow-Up Note    Assessment:  1. Lumbar radiculopathy    2. History of lumbar fusion    3. Lumbar foraminal stenosis        Plan:  Ms. Dang is a pleasant 63-year-old female significant past medical history of lumbar laminectomy followed by L4-S1 TLIF 2011 2012 at [facility] presents to [facility] for initial evaluation and referral from neurosurgery.  During today's evaluation patient is demonstrating clinical and diagnostic evidence of lumbar radiculopathy in the L3 and L4 dermatomal distribution.  Patient denies any significant radiating pain extending beyond the knees and majority of her symptoms are in the thighs.  She does have MRI evidence of mild to moderate central and foraminal narrowing likely contributing to the ongoing symptoms.  At this time we will plan for bilateral L3-L4 TFESI under fluoroscopy guidance.  All questions answered, patient is agreeable with plan.  Complete risks and benefits including bleeding, infection, tissue reaction, nerve injury and allergic reaction were discussed. The approach was demonstrated using models and literature was provided. Verbal and written consent was obtained.    History of Present Illness:    Caty Dang is a 63 y.o. female who presents to [facility] for interval re-evaluation of the above stated pain complaints. The patient has a past medical and chronic pain history as outlined in the assessment section.  Patient presents to [facility] for initial evaluation regarding 7 months duration of low back pain with radiating symptoms into bilateral lower extremities.  Denies any significant inciting event or recent trauma.  Today reports moderate to severe pain rated 4-7 out of 10 and interfere with activities.  Pain is nearly constant 60 to 95% of the time that is worse in the afternoon.  Describe symptoms of burning, dull aching, pressure-like.  Also reports lower  "extremity weakness but denies falls.  Does not use any durable medical equipment for ambulation.  Symptoms are worse with standing, sitting, walking.  Reports moderate relief with chiropractic manipulation and no significant improvements with lumbar traction or physical therapy.  Denies smoking, marijuana or alcohol use.  Symptoms are improved with Percocet, tramadol, Vicodin, Lidoderm patches, Motrin, meloxicam, Tylenol, Flexeril.  Presents today for initial evaluation.    Other than as stated above, the patient denies any interval changes in medications, medical condition, mental condition, symptoms, or allergies since the last office visit.         Review of Systems:    Review of Systems   Constitutional:  Negative for chills and fatigue.   HENT:  Positive for hearing loss. Negative for ear pain, mouth sores and sinus pressure.    Eyes:  Positive for pain. Negative for redness and visual disturbance.   Respiratory:  Negative for shortness of breath and wheezing.    Cardiovascular:  Negative for chest pain and palpitations.   Gastrointestinal:  Negative for abdominal pain and nausea.   Endocrine: Negative for polyphagia.   Genitourinary:  Positive for frequency.   Musculoskeletal:  Positive for back pain, gait problem and joint swelling. Negative for arthralgias and neck pain.        Joint pain and muscle pain,   Skin:  Negative for wound.   Neurological:  Positive for weakness and headaches. Negative for seizures.   Psychiatric/Behavioral:  Positive for sleep disturbance. Negative for dysphoric mood. The patient is nervous/anxious.          Past Medical History:   Diagnosis Date    Allergic rhinitis     On amd off my whole life    Disease of thyroid gland     Years ago    Diverticulitis, colon     Ear problems     Started at age 2    Fatigue     GERD (gastroesophageal reflux disease) On and off    Headache(784.0)     HL (hearing loss)     Hypertension     Hypothyroidism     h/o \" borderline \"  issues    Liver " cyst     last assessed 4/18/16    Lyme disease     Obesity     Long time    Otitis media     On and off since i was 2    Positive Anaplasma serology     RA (rheumatoid arthritis) (HCC)     Seizure disorder in pregnancy (HCC)     had 1 seizure x1 1986 during pregnancy- none after    Seizures (HCC) 1986    Tonsillitis     As a child    Varicose vein of leg        Past Surgical History:   Procedure Laterality Date    BACK SURGERY      fusion/refusion of 2-8 vertebrae    COLECTOMY      partial sigmoid, last assessed 4/18/16    JOINT REPLACEMENT      LAPAROSCOPY      with adnexectomy    LIVER SURGERY      ID SURGICAL ARTHROSCOPY LILLI W/CORACOACRM LIGM RLS Right 3/12/2020    Procedure: ARTHROSCOPY SHOULDER, BICEPS TENODESIS;  Surgeon: Teddy Keen MD;  Location: [facility];  Service: Orthopedics    ID SURGICAL ARTHROSCOPY SHOULDER W/ROTATOR CUFF RPR Left 1/3/2019    Procedure: REPAIR ROTATOR CUFF  ARTHROSCOPIC, BICEPTS TENODESIS, SUBACROMIAL DECOMPRESION;  Surgeon: Teddy Keen MD;  Location: [facility];  Service: Orthopedics    ID SURGICAL ARTHROSCOPY SHOULDER W/ROTATOR CUFF RPR Right 3/12/2020    Procedure: REPAIR ROTATOR CUFF  ARTHROSCOPIC WITH SUBACROMIAL DECOMPRESSION;  Surgeon: Teddy Keen MD;  Location: [facility];  Service: Orthopedics    REPLACEMENT TOTAL KNEE Bilateral     SINUS SURGERY      TONSILLECTOMY      TYMPANOSTOMY TUBE PLACEMENT         Family History   Problem Relation Age of Onset    Hypertension Mother     Pancreatic cancer Mother 79    Cancer Mother         Pancreatic cancer    Arthritis Father     Hyperthyroidism Father     Hypertension Father     Rheum arthritis Father     No Known Problems Sister     No Known Problems Daughter     No Known Problems Daughter     No Known Problems Daughter     No Known Problems Maternal Grandmother     No Known Problems Maternal Grandfather     No Known Problems Paternal Grandmother     No Known Problems Paternal Grandfather     No Known Problems  Brother     Rheum arthritis Brother     No Known Problems Maternal Aunt     No Known Problems Maternal Aunt     No Known Problems Paternal Aunt     No Known Problems Paternal Aunt     No Known Problems Paternal Uncle     ADD / ADHD Neg Hx     Anesthesia problems Neg Hx     Clotting disorder Neg Hx     Collagen disease Neg Hx     Diabetes Neg Hx     Dislocations Neg Hx     Learning disabilities Neg Hx     Neurological problems Neg Hx     Osteoporosis Neg Hx     Rheumatologic disease Neg Hx     Scoliosis Neg Hx     Vascular Disease Neg Hx        Social History     Occupational History    Not on file   Tobacco Use    Smoking status: Never    Smokeless tobacco: Never   Vaping Use    Vaping status: Never Used   Substance and Sexual Activity    Alcohol use: No    Drug use: No    Sexual activity: Yes     Partners: Male     Birth control/protection: Female Sterilization         Current Outpatient Medications:     amLODIPine (NORVASC) 5 mg tablet, TAKE 1 TABLET (5 MG TOTAL) BY MOUTH DAILY., Disp: 90 tablet, Rfl: 1    atenolol (TENORMIN) 100 mg tablet, Take 1 tablet (100 mg total) by mouth daily, Disp: 90 tablet, Rfl: 3    Azelastine HCl 137 MCG/SPRAY SOLN, 1 spray by Each Nare route 2 (two) times a day for 600 doses As directed, Disp: 30 mL, Rfl: 2    fluticasone (FLONASE) 50 mcg/act nasal spray, 2 sprays into each nostril daily, Disp: 16 g, Rfl: 3    hydrochlorothiazide (HYDRODIURIL) 25 mg tablet, Take 1 tablet (25 mg total) by mouth daily, Disp: 90 tablet, Rfl: 6    hydroxychloroquine (PLAQUENIL) 200 mg tablet, Take 200 mg by mouth 2 (two) times a day with meals, Disp: , Rfl: 0    loratadine (Claritin) 10 mg tablet, Take 10 mg by mouth daily, Disp: , Rfl:     meloxicam (MOBIC) 15 mg tablet, , Disp: , Rfl:     RESTASIS MULTIDOSE 0.05 % ophthalmic emulsion, Administer to both eyes every 12 (twelve) hours  , Disp: , Rfl:     traMADol (ULTRAM) 50 mg tablet, Take 50 mg by mouth every 6 (six) hours as needed for moderate pain,  Disp: , Rfl:     montelukast (SINGULAIR) 10 mg tablet, Take 1 tablet (10 mg total) by mouth daily at bedtime, Disp: 30 tablet, Rfl: 3    Allergies   Allergen Reactions    Pollen Extract Other (See Comments)     headaches       Physical Exam:    /75   Pulse 75   Wt 111 kg (245 lb)   BMI 38.37 kg/m²     Constitutional:normal, well developed, well nourished, alert, in no distress and non-toxic and no overt pain behavior.  Eyes:anicteric  HEENT:grossly intact  Neck:supple, symmetric, trachea midline and no masses   Pulmonary:even and unlabored  Cardiovascular:No edema or pitting edema present  Skin:Normal without rashes or lesions and well hydrated  Psychiatric:Mood and affect appropriate  Neurologic:Cranial Nerves II-XII grossly intact  Musculoskeletal:normal gait, tenderness to palpation bilateral lumbar paraspinals, decreased active and passive range of motion with lumbar flexion and extension limited by pain, MMT 5-5 bilateral lower extremities, sensation decreased to light touch in patchy distribution posterior thighs, DTRs within normal limits      Imaging   MRI lumbar spine with and without contrast  Status: Final result     PACS Images     Show images for MRI lumbar spine with and without contrast  Study Result    Narrative & Impression   MRI LUMBAR SPINE WITH AND WITHOUT CONTRAST     INDICATION: M54.16: Radiculopathy, lumbar region  M54.50: Low back pain, unspecified.     COMPARISON: X-ray dated 11/15/2023. MRI from 6/30/2015.     TECHNIQUE:  Multiplanar, multisequence imaging of the lumbar spine was performed before and after gadolinium administration. .        IV Contrast:  10 mL of Gadobutrol injection (SINGLE-DOSE)     IMAGE QUALITY:  Diagnostic     FINDINGS:     VERTEBRAL BODIES:  There are 5 lumbar type vertebral bodies. Stable postop changes status post fusion from L4-S1 with pedicle screws and intervertebral cages. There grade 1 anterolisthesis is unchanged compared with x-ray but progressed  since 2015. No   scoliosis. No compression. No suspicious marrow signal abnormality.     SACRUM:  Normal signal within the sacrum. No evidence of insufficiency or stress fracture.     DISTAL CORD AND CONUS:  Normal size and signal within the distal cord and conus.     PARASPINAL SOFT TISSUES:  Paraspinal soft tissues are unremarkable.     LOWER THORACIC DISC SPACES: Mild noncompressive lower thoracic degenerative change.     LUMBAR DISC SPACES:     L1-L2: No disc herniation, canal or foraminal stenosis.     L2-L3: Disc bulge and facet arthropathy. Mild canal stenosis.. No significant foraminal stenosis.     L3-L4: Uncovering of the disc. Facet arthropathy. Mild to moderate stenosis. Lateral recess narrowing. No significant foraminal stenosis.     L4-L5: Status post discectomy and fusion. Right hemilaminectomy. No disc herniation. No canal or foraminal stenosis.     L5-S1: Status post discectomy and fusion. Right hemilaminectomy and left laminotomy defect also suggested. No disc herniation. No canal or foraminal stenosis.     POSTCONTRAST IMAGING:  No abnormal enhancement.     OTHER FINDINGS: Multiple T2 hyperintense liver lesions compatible with cysts on abdominal ultrasound from 2016. Largest cyst measures 6.4 cm.     IMPRESSION:     Degenerative grade 1 anterolisthesis at L3-4 with mild to moderate canal stenosis, progressed since 2015.        Workstation performed: [id number]        Imaging    MRI lumbar spine with and without contrast (Order: [id number]) - 12/15/2023    Result History    MRI lumbar spine with and without contrast (Order #[id number]) on 12/22/2023 - Order Result History Report    Order Report     Order Details  Order Questions    Question Answer   What is the patient's sedation requirement? If Medication for Claustrophobia is selected, order medication at this point. No Sedation   Does this procedure require the 3T MRI at [place] or [place]? No   Release to patient through [facility] Immediate   Is order  priority selected as STAT? No   Exam reason LBP with radiculopathy   Note: Enter reason for exam            Result Information    Status Priority Source   Final result (12/22/2023  2:29 PM) Routine      Reason for Exam    LBP with radiculopathy; Lumbar radiculopathy, prior surgery, new symptoms; LBP with radiculopathy   Dx: Lumbar radiculopathy [M54.16 (ICD-10-CM)]; Lumbago [M54.50 (ICD-10-CM)]       All Reviewers List    Jenny Queen PA-C on 1/3/2024  8:37 PM         MRI lumbar spine with and without contrast: Patient Communication     Add Comments   Seen       Signed by    Signed Date/Time  Phone Pager   SCHOENBERGER, E. ALEC 12/22/2023 14:29 [phone]        Exam Information    Status Exam Begun  Exam Ended  Performing Tech   Final [99] 12/15/2023 12:01 12/15/2023 12:35 Lawrence Rincon       Questionnaire          Question Answer Comment   1. What is the patient's sedation requirement? If Medication for Claustrophobia is selected, order medication at this point. No Sedation    2. Does the patient have metallic implants?     3. Does this procedure require the 3T MRI at [place] or [place]? No    4. Release to patient through [facility] Immediate    5. Is order priority selected as STAT? No    6. Reason for Exam (FREE TEXT) LBP with radiculopathy    7. When should the test be performed?           Begin Exam      IMAGING BEGIN MRI    Question Answer Comment   1. Have you reviewed the MRI Screening Form? Yes    2. Have you performed a Full Stop/Final Check before entering Zone 4? Yes         End Exam      IMAGING IV CONTRAST EXTRAVASATION    Question Answer Comment   1. Was there an IV Contrast Extravasation? No    2. What was the Type and Amount of Contrast Inflitrated?     3. What was the location of the IV Site?     4. What treatment was provided to the patient?     5. If a surgeon was consulted, who was notified?     6. Name of Physician/Nurse who evaluated the patient:           IMAGING END MRI TECH  "NOTES    Question Answer Comment   1. Script info as it appears: MRI LUMBAR SPINE W WO CONTRAST    2. Patient History: LBP with radiculopathy, Lumbar radiculopathy, prior surgery, new symptoms, LBP with radiculopathy, Lumbar radiculopathy, Lumbago    3. Has the patient had prior surgery on this body part? yes    4. Any history of cancer? n    5. Add'l Imaging Notes: n    6. Outside images/report information: n    7. Implant clearance information: cleared    8. LMP and ORAL contraceptives: pnp    9. Was jewelry removed from the patient? No    10. Jewelry removed:           PATIENT EDUCATION    Question Answer Comment   1. Was the patient educated? Yes    2. Why was the patient not educated?            Screening Form Questions     important suggestion  Questionnaires are displayed in the order they were answered.      Answer Comment   Has patient changed into the appropriate hospital gown? Yes    What are your symptoms? lumbar radiculopathy    Do you have a cardiac pacemaker or internal defibrillator? NA    Please list /make/model:     Have you had any HEART surgery? None    Please list make/model:     Heart surgery: If \"other\", please describe:     Have you had any BRAIN surgery? None    Please list  or describe implant:     Brain surgery: If \"other\", please describe:     Have you had any EYE surgery? None    Eye surgery: If \"other\", please describe:     Have you ever injured your eyes with metal or metal fragments (grinding,metallic slivers)? No    Eye injury: Please describe:     Have you had any EAR surgery? None    Ear surgery: If \"other\", please describe:     Do you have an electronic \"stimulation\" device? None    Please provide  information:     Have you had any abdominal or pelvic surgery? No    Have you had any of the following abdominal or pelvic surgeries:     Abdominal/pelvic surgery: If \"other\", please describe:     Do you have any ORTHOPEDIC implants/devices? " "Screws/pins/anchors     Joint replacement    Do you have the following: None    Do you have liver disease? None    Do you have kidney disease? High blood pressure, being treated for    Have you had a problem with a previous MRI? Claustrophobia    Does the patient require pre-medication? No    Do you have a personal history of cancer? None    If \"other\", please specify:       Are you wearing medication patches?   No    Are you wearing any of the following: None    Date of last menstrual cycle: n/a    Postmenopausal? Yes    Pregnant?     Breastfeeding?     Breast tissue expander?     Do any of the following apply to you:     Please specify:     Did the patient provide this information? Yes    Who provided this information (if not the patient)?     Relationship to the patient:     Contact number:     MRI STAFF ONLY- Prior imaging reviewed in PACS (initials): my    MRI STAFF ONLY- Epic chart reviewed (initials): my    MRI STAFF ONLY: The patient was scheduled to receive MRI contrast and has received the Medication Guide. Yes        External Results Report    Open External Results Report      Encounter    View Encounter                     Patient Care Timeline    No data selected in time range    Pre-op Summary    Pre-op             Recovery Summary    Recovery                 Routing History    None         No orders to display         No orders of the defined types were placed in this encounter.    "

## 2024-01-09 NOTE — PROGRESS NOTES
Pain Medicine Follow-Up Note    Assessment:  1. Lumbar radiculopathy    2. History of lumbar fusion    3. Lumbar foraminal stenosis        Plan:  Ms. Dang is a pleasant 63-year-old female significant past medical history of lumbar laminectomy followed by L4-S1 TLIF 2011 2012 at [facility] presents to [facility] for initial evaluation and referral from neurosurgery.  During today's evaluation patient is demonstrating clinical and diagnostic evidence of lumbar radiculopathy in the L3 and L4 dermatomal distribution.  Patient denies any significant radiating pain extending beyond the knees and majority of her symptoms are in the thighs.  She does have MRI evidence of mild to moderate central and foraminal narrowing likely contributing to the ongoing symptoms.  At this time we will plan for bilateral L3-L4 TFESI under fluoroscopy guidance.  All questions answered, patient is agreeable with plan.  Complete risks and benefits including bleeding, infection, tissue reaction, nerve injury and allergic reaction were discussed. The approach was demonstrated using models and literature was provided. Verbal and written consent was obtained.    History of Present Illness:    Caty Dang is a 63 y.o. female who presents to [facility] for interval re-evaluation of the above stated pain complaints. The patient has a past medical and chronic pain history as outlined in the assessment section.  Patient presents to [facility] for initial evaluation regarding 7 months duration of low back pain with radiating symptoms into bilateral lower extremities.  Denies any significant inciting event or recent trauma.  Today reports moderate to severe pain rated 4-7 out of 10 and interfere with activities.  Pain is nearly constant 60 to 95% of the time that is worse in the afternoon.  Describe symptoms of burning, dull aching, pressure-like.  Also reports lower  "extremity weakness but denies falls.  Does not use any durable medical equipment for ambulation.  Symptoms are worse with standing, sitting, walking.  Reports moderate relief with chiropractic manipulation and no significant improvements with lumbar traction or physical therapy.  Denies smoking, marijuana or alcohol use.  Symptoms are improved with Percocet, tramadol, Vicodin, Lidoderm patches, Motrin, meloxicam, Tylenol, Flexeril.  Presents today for initial evaluation.    Other than as stated above, the patient denies any interval changes in medications, medical condition, mental condition, symptoms, or allergies since the last office visit.         Review of Systems:    Review of Systems   Constitutional:  Negative for chills and fatigue.   HENT:  Positive for hearing loss. Negative for ear pain, mouth sores and sinus pressure.    Eyes:  Positive for pain. Negative for redness and visual disturbance.   Respiratory:  Negative for shortness of breath and wheezing.    Cardiovascular:  Negative for chest pain and palpitations.   Gastrointestinal:  Negative for abdominal pain and nausea.   Endocrine: Negative for polyphagia.   Genitourinary:  Positive for frequency.   Musculoskeletal:  Positive for back pain, gait problem and joint swelling. Negative for arthralgias and neck pain.        Joint pain and muscle pain,   Skin:  Negative for wound.   Neurological:  Positive for weakness and headaches. Negative for seizures.   Psychiatric/Behavioral:  Positive for sleep disturbance. Negative for dysphoric mood. The patient is nervous/anxious.          Past Medical History:   Diagnosis Date    Allergic rhinitis     On amd off my whole life    Disease of thyroid gland     Years ago    Diverticulitis, colon     Ear problems     Started at age 2    Fatigue     GERD (gastroesophageal reflux disease) On and off    Headache(784.0)     HL (hearing loss)     Hypertension     Hypothyroidism     h/o \" borderline \"  issues    Liver " cyst     last assessed 4/18/16    Lyme disease     Obesity     Long time    Otitis media     On and off since i was 2    Positive Anaplasma serology     RA (rheumatoid arthritis) (HCC)     Seizure disorder in pregnancy (HCC)     had 1 seizure x1 1986 during pregnancy- none after    Seizures (HCC) 1986    Tonsillitis     As a child    Varicose vein of leg        Past Surgical History:   Procedure Laterality Date    BACK SURGERY      fusion/refusion of 2-8 vertebrae    COLECTOMY      partial sigmoid, last assessed 4/18/16    JOINT REPLACEMENT      LAPAROSCOPY      with adnexectomy    LIVER SURGERY      MI SURGICAL ARTHROSCOPY LILLI W/CORACOACRM LIGM RLS Right 3/12/2020    Procedure: ARTHROSCOPY SHOULDER, BICEPS TENODESIS;  Surgeon: Teddy Keen MD;  Location: [facility];  Service: Orthopedics    MI SURGICAL ARTHROSCOPY SHOULDER W/ROTATOR CUFF RPR Left 1/3/2019    Procedure: REPAIR ROTATOR CUFF  ARTHROSCOPIC, BICEPTS TENODESIS, SUBACROMIAL DECOMPRESION;  Surgeon: Teddy Keen MD;  Location: [facility];  Service: Orthopedics    MI SURGICAL ARTHROSCOPY SHOULDER W/ROTATOR CUFF RPR Right 3/12/2020    Procedure: REPAIR ROTATOR CUFF  ARTHROSCOPIC WITH SUBACROMIAL DECOMPRESSION;  Surgeon: Teddy Keen MD;  Location: [facility];  Service: Orthopedics    REPLACEMENT TOTAL KNEE Bilateral     SINUS SURGERY      TONSILLECTOMY      TYMPANOSTOMY TUBE PLACEMENT         Family History   Problem Relation Age of Onset    Hypertension Mother     Pancreatic cancer Mother 79    Cancer Mother         Pancreatic cancer    Arthritis Father     Hyperthyroidism Father     Hypertension Father     Rheum arthritis Father     No Known Problems Sister     No Known Problems Daughter     No Known Problems Daughter     No Known Problems Daughter     No Known Problems Maternal Grandmother     No Known Problems Maternal Grandfather     No Known Problems Paternal Grandmother     No Known Problems Paternal Grandfather     No Known Problems  Brother     Rheum arthritis Brother     No Known Problems Maternal Aunt     No Known Problems Maternal Aunt     No Known Problems Paternal Aunt     No Known Problems Paternal Aunt     No Known Problems Paternal Uncle     ADD / ADHD Neg Hx     Anesthesia problems Neg Hx     Clotting disorder Neg Hx     Collagen disease Neg Hx     Diabetes Neg Hx     Dislocations Neg Hx     Learning disabilities Neg Hx     Neurological problems Neg Hx     Osteoporosis Neg Hx     Rheumatologic disease Neg Hx     Scoliosis Neg Hx     Vascular Disease Neg Hx        Social History     Occupational History    Not on file   Tobacco Use    Smoking status: Never    Smokeless tobacco: Never   Vaping Use    Vaping status: Never Used   Substance and Sexual Activity    Alcohol use: No    Drug use: No    Sexual activity: Yes     Partners: Male     Birth control/protection: Female Sterilization         Current Outpatient Medications:     amLODIPine (NORVASC) 5 mg tablet, TAKE 1 TABLET (5 MG TOTAL) BY MOUTH DAILY., Disp: 90 tablet, Rfl: 1    atenolol (TENORMIN) 100 mg tablet, Take 1 tablet (100 mg total) by mouth daily, Disp: 90 tablet, Rfl: 3    Azelastine HCl 137 MCG/SPRAY SOLN, 1 spray by Each Nare route 2 (two) times a day for 600 doses As directed, Disp: 30 mL, Rfl: 2    fluticasone (FLONASE) 50 mcg/act nasal spray, 2 sprays into each nostril daily, Disp: 16 g, Rfl: 3    hydrochlorothiazide (HYDRODIURIL) 25 mg tablet, Take 1 tablet (25 mg total) by mouth daily, Disp: 90 tablet, Rfl: 6    hydroxychloroquine (PLAQUENIL) 200 mg tablet, Take 200 mg by mouth 2 (two) times a day with meals, Disp: , Rfl: 0    loratadine (Claritin) 10 mg tablet, Take 10 mg by mouth daily, Disp: , Rfl:     meloxicam (MOBIC) 15 mg tablet, , Disp: , Rfl:     RESTASIS MULTIDOSE 0.05 % ophthalmic emulsion, Administer to both eyes every 12 (twelve) hours  , Disp: , Rfl:     traMADol (ULTRAM) 50 mg tablet, Take 50 mg by mouth every 6 (six) hours as needed for moderate pain,  Disp: , Rfl:     montelukast (SINGULAIR) 10 mg tablet, Take 1 tablet (10 mg total) by mouth daily at bedtime, Disp: 30 tablet, Rfl: 3    Allergies   Allergen Reactions    Pollen Extract Other (See Comments)     headaches       Physical Exam:    /75   Pulse 75   Wt 111 kg (245 lb)   BMI 38.37 kg/m²     Constitutional:normal, well developed, well nourished, alert, in no distress and non-toxic and no overt pain behavior.  Eyes:anicteric  HEENT:grossly intact  Neck:supple, symmetric, trachea midline and no masses   Pulmonary:even and unlabored  Cardiovascular:No edema or pitting edema present  Skin:Normal without rashes or lesions and well hydrated  Psychiatric:Mood and affect appropriate  Neurologic:Cranial Nerves II-XII grossly intact  Musculoskeletal:normal gait, tenderness to palpation bilateral lumbar paraspinals, decreased active and passive range of motion with lumbar flexion and extension limited by pain, MMT 5-5 bilateral lower extremities, sensation decreased to light touch in patchy distribution posterior thighs, DTRs within normal limits      Imaging   MRI lumbar spine with and without contrast  Status: Final result     PACS Images     Show images for MRI lumbar spine with and without contrast  Study Result    Narrative & Impression   MRI LUMBAR SPINE WITH AND WITHOUT CONTRAST     INDICATION: M54.16: Radiculopathy, lumbar region  M54.50: Low back pain, unspecified.     COMPARISON: X-ray dated 11/15/2023. MRI from 6/30/2015.     TECHNIQUE:  Multiplanar, multisequence imaging of the lumbar spine was performed before and after gadolinium administration. .        IV Contrast:  10 mL of Gadobutrol injection (SINGLE-DOSE)     IMAGE QUALITY:  Diagnostic     FINDINGS:     VERTEBRAL BODIES:  There are 5 lumbar type vertebral bodies. Stable postop changes status post fusion from L4-S1 with pedicle screws and intervertebral cages. There grade 1 anterolisthesis is unchanged compared with x-ray but progressed  since 2015. No   scoliosis. No compression. No suspicious marrow signal abnormality.     SACRUM:  Normal signal within the sacrum. No evidence of insufficiency or stress fracture.     DISTAL CORD AND CONUS:  Normal size and signal within the distal cord and conus.     PARASPINAL SOFT TISSUES:  Paraspinal soft tissues are unremarkable.     LOWER THORACIC DISC SPACES: Mild noncompressive lower thoracic degenerative change.     LUMBAR DISC SPACES:     L1-L2: No disc herniation, canal or foraminal stenosis.     L2-L3: Disc bulge and facet arthropathy. Mild canal stenosis.. No significant foraminal stenosis.     L3-L4: Uncovering of the disc. Facet arthropathy. Mild to moderate stenosis. Lateral recess narrowing. No significant foraminal stenosis.     L4-L5: Status post discectomy and fusion. Right hemilaminectomy. No disc herniation. No canal or foraminal stenosis.     L5-S1: Status post discectomy and fusion. Right hemilaminectomy and left laminotomy defect also suggested. No disc herniation. No canal or foraminal stenosis.     POSTCONTRAST IMAGING:  No abnormal enhancement.     OTHER FINDINGS: Multiple T2 hyperintense liver lesions compatible with cysts on abdominal ultrasound from 2016. Largest cyst measures 6.4 cm.     IMPRESSION:     Degenerative grade 1 anterolisthesis at L3-4 with mild to moderate canal stenosis, progressed since 2015.        Workstation performed: [id number]        Imaging    MRI lumbar spine with and without contrast (Order: [id number]) - 12/15/2023    Result History    MRI lumbar spine with and without contrast (Order #[id number]) on 12/22/2023 - Order Result History Report    Order Report     Order Details  Order Questions    Question Answer   What is the patient's sedation requirement? If Medication for Claustrophobia is selected, order medication at this point. No Sedation   Does this procedure require the 3T MRI at [place] or [place]? No   Release to patient through Health system Immediate   Is order  priority selected as STAT? No   Exam reason LBP with radiculopathy   Note: Enter reason for exam            Result Information    Status Priority Source   Final result (12/22/2023  2:29 PM) Routine      Reason for Exam    LBP with radiculopathy; Lumbar radiculopathy, prior surgery, new symptoms; LBP with radiculopathy   Dx: Lumbar radiculopathy [M54.16 (ICD-10-CM)]; Lumbago [M54.50 (ICD-10-CM)]       All Reviewers List    Jenny Queen PA-C on 1/3/2024  8:37 PM         MRI lumbar spine with and without contrast: Patient Communication     Add Comments   Seen       Signed by    Signed Date/Time  Phone Pager   SCHOENBERGER, E. ALEC 12/22/2023 14:29 [phone]        Exam Information    Status Exam Begun  Exam Ended  Performing Tech   Final [99] 12/15/2023 12:01 12/15/2023 12:35 Lawrence Rincon       Questionnaire          Question Answer Comment   1. What is the patient's sedation requirement? If Medication for Claustrophobia is selected, order medication at this point. No Sedation    2. Does the patient have metallic implants?     3. Does this procedure require the 3T MRI at [place] or [place]? No    4. Release to patient through [facility] Immediate    5. Is order priority selected as STAT? No    6. Reason for Exam (FREE TEXT) LBP with radiculopathy    7. When should the test be performed?           Begin Exam      IMAGING BEGIN MRI    Question Answer Comment   1. Have you reviewed the MRI Screening Form? Yes    2. Have you performed a Full Stop/Final Check before entering Zone 4? Yes         End Exam      IMAGING IV CONTRAST EXTRAVASATION    Question Answer Comment   1. Was there an IV Contrast Extravasation? No    2. What was the Type and Amount of Contrast Inflitrated?     3. What was the location of the IV Site?     4. What treatment was provided to the patient?     5. If a surgeon was consulted, who was notified?     6. Name of Physician/Nurse who evaluated the patient:           IMAGING END MRI TECH  "NOTES    Question Answer Comment   1. Script info as it appears: MRI LUMBAR SPINE W WO CONTRAST    2. Patient History: LBP with radiculopathy, Lumbar radiculopathy, prior surgery, new symptoms, LBP with radiculopathy, Lumbar radiculopathy, Lumbago    3. Has the patient had prior surgery on this body part? yes    4. Any history of cancer? n    5. Add'l Imaging Notes: n    6. Outside images/report information: n    7. Implant clearance information: cleared    8. LMP and ORAL contraceptives: pnp    9. Was jewelry removed from the patient? No    10. Jewelry removed:           PATIENT EDUCATION    Question Answer Comment   1. Was the patient educated? Yes    2. Why was the patient not educated?            Screening Form Questions     important suggestion  Questionnaires are displayed in the order they were answered.      Answer Comment   Has patient changed into the appropriate hospital gown? Yes    What are your symptoms? lumbar radiculopathy    Do you have a cardiac pacemaker or internal defibrillator? NA    Please list /make/model:     Have you had any HEART surgery? None    Please list make/model:     Heart surgery: If \"other\", please describe:     Have you had any BRAIN surgery? None    Please list  or describe implant:     Brain surgery: If \"other\", please describe:     Have you had any EYE surgery? None    Eye surgery: If \"other\", please describe:     Have you ever injured your eyes with metal or metal fragments (grinding,metallic slivers)? No    Eye injury: Please describe:     Have you had any EAR surgery? None    Ear surgery: If \"other\", please describe:     Do you have an electronic \"stimulation\" device? None    Please provide  information:     Have you had any abdominal or pelvic surgery? No    Have you had any of the following abdominal or pelvic surgeries:     Abdominal/pelvic surgery: If \"other\", please describe:     Do you have any ORTHOPEDIC implants/devices? " "Screws/pins/anchors     Joint replacement    Do you have the following: None    Do you have liver disease? None    Do you have kidney disease? High blood pressure, being treated for    Have you had a problem with a previous MRI? Claustrophobia    Does the patient require pre-medication? No    Do you have a personal history of cancer? None    If \"other\", please specify:       Are you wearing medication patches?   No    Are you wearing any of the following: None    Date of last menstrual cycle: n/a    Postmenopausal? Yes    Pregnant?     Breastfeeding?     Breast tissue expander?     Do any of the following apply to you:     Please specify:     Did the patient provide this information? Yes    Who provided this information (if not the patient)?     Relationship to the patient:     Contact number:     MRI STAFF ONLY- Prior imaging reviewed in PACS (initials): my    MRI STAFF ONLY- Epic chart reviewed (initials): my    MRI STAFF ONLY: The patient was scheduled to receive MRI contrast and has received the Medication Guide. Yes        External Results Report    Open External Results Report      Encounter    View Encounter                     Patient Care Timeline    No data selected in time range    Pre-op Summary    Pre-op             Recovery Summary    Recovery                 Routing History    None         No orders to display         No orders of the defined types were placed in this encounter.    "

## 2024-01-19 ENCOUNTER — OFFICE VISIT (OUTPATIENT)
Dept: RHEUMATOLOGY | Facility: CLINIC | Age: 64
End: 2024-01-19
Payer: MEDICARE

## 2024-01-19 ENCOUNTER — APPOINTMENT (OUTPATIENT)
Dept: LAB | Facility: CLINIC | Age: 64
End: 2024-01-19
Payer: MEDICARE

## 2024-01-19 VITALS
DIASTOLIC BLOOD PRESSURE: 80 MMHG | WEIGHT: 247.6 LBS | SYSTOLIC BLOOD PRESSURE: 148 MMHG | HEIGHT: 67 IN | BODY MASS INDEX: 38.86 KG/M2

## 2024-01-19 DIAGNOSIS — Z11.59 NEED FOR HEPATITIS B SCREENING TEST: ICD-10-CM

## 2024-01-19 DIAGNOSIS — M06.9 RHEUMATOID ARTHRITIS INVOLVING MULTIPLE SITES, UNSPECIFIED WHETHER RHEUMATOID FACTOR PRESENT (HCC): Primary | ICD-10-CM

## 2024-01-19 DIAGNOSIS — M15.9 OSTEOARTHRITIS OF MULTIPLE JOINTS, UNSPECIFIED OSTEOARTHRITIS TYPE: ICD-10-CM

## 2024-01-19 DIAGNOSIS — M25.50 POLYARTHRALGIA: ICD-10-CM

## 2024-01-19 DIAGNOSIS — M06.9: ICD-10-CM

## 2024-01-19 LAB
ALBUMIN SERPL BCP-MCNC: 4.6 G/DL (ref 3.5–5)
ALP SERPL-CCNC: 114 U/L (ref 34–104)
ALT SERPL W P-5'-P-CCNC: 23 U/L (ref 7–52)
ANA SER QL IA: NEGATIVE
ANION GAP SERPL CALCULATED.3IONS-SCNC: 7 MMOL/L
AST SERPL W P-5'-P-CCNC: 21 U/L (ref 13–39)
BASOPHILS # BLD AUTO: 0.03 THOUSANDS/ÂΜL (ref 0–0.1)
BASOPHILS NFR BLD AUTO: 1 % (ref 0–1)
BILIRUB SERPL-MCNC: 0.63 MG/DL (ref 0.2–1)
BUN SERPL-MCNC: 19 MG/DL (ref 5–25)
CALCIUM SERPL-MCNC: 9.9 MG/DL (ref 8.4–10.2)
CHLORIDE SERPL-SCNC: 103 MMOL/L (ref 96–108)
CO2 SERPL-SCNC: 30 MMOL/L (ref 21–32)
CREAT SERPL-MCNC: 0.91 MG/DL (ref 0.6–1.3)
CRP SERPL QL: <1 MG/L
EOSINOPHIL # BLD AUTO: 0.18 THOUSAND/ÂΜL (ref 0–0.61)
EOSINOPHIL NFR BLD AUTO: 4 % (ref 0–6)
ERYTHROCYTE [DISTWIDTH] IN BLOOD BY AUTOMATED COUNT: 12.7 % (ref 11.6–15.1)
ERYTHROCYTE [SEDIMENTATION RATE] IN BLOOD: 4 MM/HOUR (ref 0–29)
GFR SERPL CREATININE-BSD FRML MDRD: 66 ML/MIN/1.73SQ M
GLUCOSE SERPL-MCNC: 88 MG/DL (ref 65–140)
HBV CORE AB SER QL: NORMAL
HBV CORE IGM SER QL: NORMAL
HBV SURFACE AG SER QL: NORMAL
HCT VFR BLD AUTO: 41.8 % (ref 34.8–46.1)
HCV AB SER QL: NORMAL
HGB BLD-MCNC: 14.2 G/DL (ref 11.5–15.4)
IMM GRANULOCYTES # BLD AUTO: 0.01 THOUSAND/UL (ref 0–0.2)
IMM GRANULOCYTES NFR BLD AUTO: 0 % (ref 0–2)
LYMPHOCYTES # BLD AUTO: 0.9 THOUSANDS/ÂΜL (ref 0.6–4.47)
LYMPHOCYTES NFR BLD AUTO: 19 % (ref 14–44)
MCH RBC QN AUTO: 31.9 PG (ref 26.8–34.3)
MCHC RBC AUTO-ENTMCNC: 34 G/DL (ref 31.4–37.4)
MCV RBC AUTO: 94 FL (ref 82–98)
MONOCYTES # BLD AUTO: 0.48 THOUSAND/ÂΜL (ref 0.17–1.22)
MONOCYTES NFR BLD AUTO: 10 % (ref 4–12)
NEUTROPHILS # BLD AUTO: 3.07 THOUSANDS/ÂΜL (ref 1.85–7.62)
NEUTS SEG NFR BLD AUTO: 66 % (ref 43–75)
NRBC BLD AUTO-RTO: 0 /100 WBCS
PLATELET # BLD AUTO: 233 THOUSANDS/UL (ref 149–390)
PMV BLD AUTO: 9.7 FL (ref 8.9–12.7)
POTASSIUM SERPL-SCNC: 3.7 MMOL/L (ref 3.5–5.3)
PROT SERPL-MCNC: 7.1 G/DL (ref 6.4–8.4)
RBC # BLD AUTO: 4.45 MILLION/UL (ref 3.81–5.12)
SODIUM SERPL-SCNC: 140 MMOL/L (ref 135–147)
WBC # BLD AUTO: 4.67 THOUSAND/UL (ref 4.31–10.16)

## 2024-01-19 PROCEDURE — 86038 ANTINUCLEAR ANTIBODIES: CPT | Performed by: PHYSICIAN ASSISTANT

## 2024-01-19 PROCEDURE — 86704 HEP B CORE ANTIBODY TOTAL: CPT | Performed by: PHYSICIAN ASSISTANT

## 2024-01-19 PROCEDURE — 86803 HEPATITIS C AB TEST: CPT | Performed by: PHYSICIAN ASSISTANT

## 2024-01-19 PROCEDURE — 85025 COMPLETE CBC W/AUTO DIFF WBC: CPT | Performed by: PHYSICIAN ASSISTANT

## 2024-01-19 PROCEDURE — 86140 C-REACTIVE PROTEIN: CPT | Performed by: PHYSICIAN ASSISTANT

## 2024-01-19 PROCEDURE — 36415 COLL VENOUS BLD VENIPUNCTURE: CPT | Performed by: PHYSICIAN ASSISTANT

## 2024-01-19 PROCEDURE — 86430 RHEUMATOID FACTOR TEST QUAL: CPT | Performed by: PHYSICIAN ASSISTANT

## 2024-01-19 PROCEDURE — 99204 OFFICE O/P NEW MOD 45 MIN: CPT | Performed by: PHYSICIAN ASSISTANT

## 2024-01-19 PROCEDURE — 86200 CCP ANTIBODY: CPT | Performed by: PHYSICIAN ASSISTANT

## 2024-01-19 PROCEDURE — 85652 RBC SED RATE AUTOMATED: CPT | Performed by: PHYSICIAN ASSISTANT

## 2024-01-19 PROCEDURE — 80053 COMPREHEN METABOLIC PANEL: CPT | Performed by: PHYSICIAN ASSISTANT

## 2024-01-19 PROCEDURE — 87340 HEPATITIS B SURFACE AG IA: CPT | Performed by: PHYSICIAN ASSISTANT

## 2024-01-19 PROCEDURE — 86705 HEP B CORE ANTIBODY IGM: CPT | Performed by: PHYSICIAN ASSISTANT

## 2024-01-19 RX ORDER — CELECOXIB 100 MG/1
100 CAPSULE ORAL 2 TIMES DAILY
Qty: 60 CAPSULE | Refills: 3 | Status: SHIPPED | OUTPATIENT
Start: 2024-01-19 | End: 2024-02-18

## 2024-01-19 NOTE — PROGRESS NOTES
Assessment and Plan:  Ms. Dang is a 63-year-old female with past medical history significant for erosive rheumatoid arthritis, OA s/p bilateral TKA, lumbar radiculopathy, right rotator cuff tear, left rotator cuff tear, cervical disc herniations, L4-L5 fusion, history of diverticulosis, fatty liver, multicystic liver disease, and HTN who presents for rheumatology consultation in regards to history of rheumatoid arthritis.    Janett was diagnosed with rheumatoid arthritis approximately 7 years ago and has been following with Dr. Carrera.  Thus far, she has been treated with hydroxychloroquine, meloxicam, and tramadol.  Per chart review, she does have a history of erosive arthritis of the MCPs on the left hand as well as degenerative osteoarthritis.  Presently, I do not appreciate any active synovitis on exam.  No recent inflammation markers available for review and it is unclear whether or not rheumatoid factor/CCP were present.  We discussed that we will update labs to include hepatitis panel and the case that we may add on additional DMARD in light of history of erosive RA and prolonged morning stiffness and joint pain.  She has history of fatty liver and multicystic liver disease, therefore we will take this into consideration when choosing DMARDs.  We discussed that if we do decide to add on therapy, with consider leflunomide 10 mg once daily.  We discussed the potential side effects of leflunomide which include but are not limited to nausea, decreased WBC, and elevation of liver enzymes.  We also discussed that leflunomide would take approximately 2 to 3 months to reach peak effectiveness and that we may need to titrate the dose depending on clinical response.  We would check high risk medication monitoring labs again in 1 month and every 3 months thereafter.    For now, continue  twice daily unchanged and continue annual ophthalmology follow-up for Plaquenil toxicity monitoring.  Change meloxicam to  Celebrex 100 twice daily with food since she seems to be experiencing gastritis with meloxicam.  I have agreed to continue tramadol 50 mg once daily for continuation of therapy from previous rheumatologist.  Continue Restasis for dry eyes.    Follow-up in 3 months, or sooner as needed.    Plan:  Diagnoses and all orders for this visit:    Rheumatoid arthritis involving multiple sites, unspecified whether rheumatoid factor present (HCC)  -     CBC and differential  -     Comprehensive metabolic panel  -     C-reactive protein  -     Sedimentation rate, automated  -     RF Screen w/ Reflex to Titer  -     Cyclic citrul peptide antibody, IgG  -     MILAGROS Screen w/ Reflex to Titer/Pattern  -     Chronic Hepatitis Panel  -     celecoxib (CeleBREX) 100 mg capsule; Take 1 capsule (100 mg total) by mouth 2 (two) times a day    Osteoarthritis of multiple joints, unspecified osteoarthritis type  -     celecoxib (CeleBREX) 100 mg capsule; Take 1 capsule (100 mg total) by mouth 2 (two) times a day    Erosion of joint surface due to rheumatoid arthritis (HCC)  -     celecoxib (CeleBREX) 100 mg capsule; Take 1 capsule (100 mg total) by mouth 2 (two) times a day    Need for hepatitis B screening test  -     Chronic Hepatitis Panel    Polyarthralgia  -     CBC and differential  -     Comprehensive metabolic panel  -     C-reactive protein  -     Sedimentation rate, automated  -     RF Screen w/ Reflex to Titer  -     Cyclic citrul peptide antibody, IgG  -     MILAGROS Screen w/ Reflex to Titer/Pattern  -     Chronic Hepatitis Panel  -     celecoxib (CeleBREX) 100 mg capsule; Take 1 capsule (100 mg total) by mouth 2 (two) times a day        I have personally reviewed prior notes, recent laboratory results, and pertinent films in PACS.     Activities as tolerated.   Exercise: try to maintain a low impact exercise regimen as much as possible. Walk for 30 minutes a day for at least 3 days a week.   Continue other medications as prescribed by  "PCP and other specialists.       RTC in 3 months      Follow-up plan: 3 months        Chief Complaint  No chief complaint on file.  \"I was diagnosed about 7 years ago\"    ZEUS Dang is a 63 y.o.  female who presents as a Rheumatology consult referred by Joe Taylor MD for evaluation of rheumatoid arthritis.    The patient states that she was diagnosed with rheumatoid arthritis about 7 years ago.  She said it started with a burning pain in her calves and pain in her hands.  She states that she has been taking hydroxychloroquine and has not been started on any additional DMARD.  She has been taking meloxicam, but notes that she has been having some stomach upset therefore has been backing off of it and only taking it as needed.  She has also been taking tramadol once daily that was also prescribed by Dr. Carrera.  She experiences pain from her thighs to her hips which she attributes to her low back issues.  She also has some pain in the hands with occasional swelling around the wrist.  Morning stiffness lasts about 1 hour.    Most recent eye exam was  and was within acceptable limits.    Pertinent positives: Dry eyes (uses Restasis), dry mouth, GERD, Raynaud's phenomenon, history of 3 miscarriages ().  She recalls a rash in the past on her lower extremities that resolved with triamcinolone topical.  She also plans to see dermatology about another skin lesion on her left calf area.    Family history of rheumatoid arthritis in multiple family members.    Denies fevers, inflammatory eye disease, persistent/recurrent skin rash, psoriasis, photosensitivity, mouth/nose ulcers, swollen glands, pleuritic chest pain, abdominal pain, vomiting, diarrhea, blood in stools, inflammatory bowel disease, blood clots.      Review of Systems  Review of Systems  Constitutional: Negative for weight change, fevers, chills, night sweats  ENT/Mouth: +dry eye, dry mouth. Negative for hearing changes, ear pain, nasal " congestion, sinus pain, hoarseness, sore throat, rhinorrhea, swallowing difficulty.   Eyes: Negative for pain, redness, discharge, vision changes.   Cardiovascular: Negative for chest pain, SOB, palpitations.   Respiratory: Negative for cough, sputum, wheezing, dyspnea.   Gastrointestinal: Negative for nausea, vomiting, diarrhea, constipation, pain, heartburn.  Genitourinary: Negative for dysuria, urinary frequency, hematuria.   Musculoskeletal: As per HPI.  Skin: +color changes. Negative for skin rash.   Neuro: Negative for weakness, numbness, tingling, loss of consciousness.   Psych: Negative for anxiety, depression.   Heme/Lymph: Negative for easy bruising, bleeding, lymphadenopathy.      Allergies  Allergies   Allergen Reactions    Pollen Extract Other (See Comments)     headaches       Home Medications    Current Outpatient Medications:     celecoxib (CeleBREX) 100 mg capsule, Take 1 capsule (100 mg total) by mouth 2 (two) times a day, Disp: 60 capsule, Rfl: 3    amLODIPine (NORVASC) 5 mg tablet, TAKE 1 TABLET (5 MG TOTAL) BY MOUTH DAILY., Disp: 90 tablet, Rfl: 1    atenolol (TENORMIN) 100 mg tablet, Take 1 tablet (100 mg total) by mouth daily, Disp: 90 tablet, Rfl: 3    Azelastine HCl 137 MCG/SPRAY SOLN, 1 spray by Each Nare route 2 (two) times a day for 600 doses As directed, Disp: 30 mL, Rfl: 2    fluticasone (FLONASE) 50 mcg/act nasal spray, 2 sprays into each nostril daily, Disp: 16 g, Rfl: 3    hydrochlorothiazide (HYDRODIURIL) 25 mg tablet, Take 1 tablet (25 mg total) by mouth daily, Disp: 90 tablet, Rfl: 6    hydroxychloroquine (PLAQUENIL) 200 mg tablet, Take 200 mg by mouth 2 (two) times a day with meals, Disp: , Rfl: 0    loratadine (Claritin) 10 mg tablet, Take 10 mg by mouth daily, Disp: , Rfl:     montelukast (SINGULAIR) 10 mg tablet, Take 1 tablet (10 mg total) by mouth daily at bedtime, Disp: 30 tablet, Rfl: 3    RESTASIS MULTIDOSE 0.05 % ophthalmic emulsion, Administer to both eyes every 12  "(twelve) hours  , Disp: , Rfl:     traMADol (ULTRAM) 50 mg tablet, Take 50 mg by mouth every 6 (six) hours as needed for moderate pain, Disp: , Rfl:     Past Medical History  Past Medical History:   Diagnosis Date    Allergic rhinitis     On amd off my whole life    Disease of thyroid gland     Years ago    Diverticulitis, colon     Ear problems     Started at age 2    Fatigue     GERD (gastroesophageal reflux disease) On and off    Headache(784.0)     HL (hearing loss)     Hypertension     Hypothyroidism     h/o \" borderline \"  issues    Liver cyst     last assessed 4/18/16    Lyme disease     Obesity     Long time    Otitis media     On and off since i was 2    Positive Anaplasma serology     RA (rheumatoid arthritis) (HCC)     Seizure disorder in pregnancy (HCC)     had 1 seizure x1 1986 during pregnancy- none after    Seizures (HCC) 1986    Tonsillitis     As a child    Varicose vein of leg        Past Surgical History   Past Surgical History:   Procedure Laterality Date    BACK SURGERY      fusion/refusion of 2-8 vertebrae    COLECTOMY      partial sigmoid, last assessed 4/18/16    JOINT REPLACEMENT      LAPAROSCOPY      with adnexectomy    LIVER SURGERY      ID SURGICAL ARTHROSCOPY LILLI W/CORACOACRM LIGM RLS Right 3/12/2020    Procedure: ARTHROSCOPY SHOULDER, BICEPS TENODESIS;  Surgeon: Teddy Keen MD;  Location: [facility];  Service: Orthopedics    ID SURGICAL ARTHROSCOPY SHOULDER W/ROTATOR CUFF RPR Left 1/3/2019    Procedure: REPAIR ROTATOR CUFF  ARTHROSCOPIC, BICEPTS TENODESIS, SUBACROMIAL DECOMPRESION;  Surgeon: Teddy Keen MD;  Location: [facility];  Service: Orthopedics    ID SURGICAL ARTHROSCOPY SHOULDER W/ROTATOR CUFF RPR Right 3/12/2020    Procedure: REPAIR ROTATOR CUFF  ARTHROSCOPIC WITH SUBACROMIAL DECOMPRESSION;  Surgeon: Teddy Keen MD;  Location: [facility];  Service: Orthopedics    REPLACEMENT TOTAL KNEE Bilateral     SINUS SURGERY      TONSILLECTOMY      TYMPANOSTOMY TUBE " "PLACEMENT         Family History    Family History   Problem Relation Age of Onset    Hypertension Mother     Pancreatic cancer Mother 79    Cancer Mother         Pancreatic cancer    Arthritis Father     Hyperthyroidism Father     Hypertension Father     Rheum arthritis Father     No Known Problems Sister     No Known Problems Daughter     No Known Problems Daughter     No Known Problems Daughter     No Known Problems Maternal Grandmother     No Known Problems Maternal Grandfather     No Known Problems Paternal Grandmother     No Known Problems Paternal Grandfather     No Known Problems Brother     Rheum arthritis Brother     No Known Problems Maternal Aunt     No Known Problems Maternal Aunt     No Known Problems Paternal Aunt     No Known Problems Paternal Aunt     No Known Problems Paternal Uncle     ADD / ADHD Neg Hx     Anesthesia problems Neg Hx     Clotting disorder Neg Hx     Collagen disease Neg Hx     Diabetes Neg Hx     Dislocations Neg Hx     Learning disabilities Neg Hx     Neurological problems Neg Hx     Osteoporosis Neg Hx     Rheumatologic disease Neg Hx     Scoliosis Neg Hx     Vascular Disease Neg Hx      Fam hx RA in multiple family members    Social History  Social History     Substance and Sexual Activity   Alcohol Use No     Social History     Substance and Sexual Activity   Drug Use No     Social History     Tobacco Use   Smoking Status Never   Smokeless Tobacco Never       Objective:  Vitals:    01/19/24 0951   BP: 148/80   Weight: 112 kg (247 lb 9.6 oz)   Height: 5' 7\" (1.702 m)       Physical Exam  General: Well appearing, well nourished, in no acute distress. Oriented x 3, normal mood and affect.  Ambulating without difficulty.  Skin: Good turgor, no rash, unusual bruising or prominent lesions.  Hair: Normal texture and distribution.  Nails: Normal color, no deformities.  HEENT:  Head: Normocephalic, atraumatic.  Eyes: Conjunctiva clear, sclera non-icteric, EOM intact.  Nose: No external " lesions, mucosa non-inflamed.  Mouth: Mucous membranes moist, no mucosal lesions.  Neck: Supple  Musculoskeletal:   No pain or swelling of joints bilaterally to include: shoulder, elbow, wrist, MCP I-V, PIP I-V, knee, ankle, MTP I-V.  Neurologic: Alert and oriented. No focal neurological deficits appreciated.   Psychiatric: Normal mood and affect.       Reviewed labs and imaging.    Imaging:   No results found.     Labs:   Office Visit on 01/19/2024   Component Date Value Ref Range Status    WBC 01/19/2024 4.67  4.31 - 10.16 Thousand/uL Final    RBC 01/19/2024 4.45  3.81 - 5.12 Million/uL Final    Hemoglobin 01/19/2024 14.2  11.5 - 15.4 g/dL Final    Hematocrit 01/19/2024 41.8  34.8 - 46.1 % Final    MCV 01/19/2024 94  82 - 98 fL Final    MCH 01/19/2024 31.9  26.8 - 34.3 pg Final    MCHC 01/19/2024 34.0  31.4 - 37.4 g/dL Final    RDW 01/19/2024 12.7  11.6 - 15.1 % Final    MPV 01/19/2024 9.7  8.9 - 12.7 fL Final    Platelets 01/19/2024 233  149 - 390 Thousands/uL Final    nRBC 01/19/2024 0  /100 WBCs Final    Neutrophils Relative 01/19/2024 66  43 - 75 % Final    Immat GRANS % 01/19/2024 0  0 - 2 % Final    Lymphocytes Relative 01/19/2024 19  14 - 44 % Final    Monocytes Relative 01/19/2024 10  4 - 12 % Final    Eosinophils Relative 01/19/2024 4  0 - 6 % Final    Basophils Relative 01/19/2024 1  0 - 1 % Final    Neutrophils Absolute 01/19/2024 3.07  1.85 - 7.62 Thousands/µL Final    Immature Grans Absolute 01/19/2024 0.01  0.00 - 0.20 Thousand/uL Final    Lymphocytes Absolute 01/19/2024 0.90  0.60 - 4.47 Thousands/µL Final    Monocytes Absolute 01/19/2024 0.48  0.17 - 1.22 Thousand/µL Final    Eosinophils Absolute 01/19/2024 0.18  0.00 - 0.61 Thousand/µL Final    Basophils Absolute 01/19/2024 0.03  0.00 - 0.10 Thousands/µL Final    Sodium 01/19/2024 140  135 - 147 mmol/L Final    Potassium 01/19/2024 3.7  3.5 - 5.3 mmol/L Final    Chloride 01/19/2024 103  96 - 108 mmol/L Final    CO2 01/19/2024 30  21 - 32 mmol/L  Final    ANION GAP 01/19/2024 7  mmol/L Final    BUN 01/19/2024 19  5 - 25 mg/dL Final    Creatinine 01/19/2024 0.91  0.60 - 1.30 mg/dL Final    Standardized to IDMS reference method    Glucose 01/19/2024 88  65 - 140 mg/dL Final    If the patient is fasting, the ADA then defines impaired fasting glucose as > 100 mg/dL and diabetes as > or equal to 123 mg/dL.    Calcium 01/19/2024 9.9  8.4 - 10.2 mg/dL Final    AST 01/19/2024 21  13 - 39 U/L Final    ALT 01/19/2024 23  7 - 52 U/L Final    Specimen collection should occur prior to Sulfasalazine administration due to the potential for falsely depressed results.     Alkaline Phosphatase 01/19/2024 114 (H)  34 - 104 U/L Final    Total Protein 01/19/2024 7.1  6.4 - 8.4 g/dL Final    Albumin 01/19/2024 4.6  3.5 - 5.0 g/dL Final    Total Bilirubin 01/19/2024 0.63  0.20 - 1.00 mg/dL Final    Use of this assay is not recommended for patients undergoing treatment with eltrombopag due to the potential for falsely elevated results.  N-acetyl-p-benzoquinone imine (metabolite of Acetaminophen) will generate erroneously low results in samples for patients that have taken an overdose of Acetaminophen.    eGFR 01/19/2024 66  ml/min/1.73sq m Final    CRP 01/19/2024 <1.0  <3.0 mg/L Final    Sed Rate 01/19/2024 4  0 - 29 mm/hour Final   Appointment on 11/15/2023   Component Date Value Ref Range Status    BUN 11/15/2023 14  5 - 25 mg/dL Final    Creatinine 11/15/2023 0.77  0.60 - 1.30 mg/dL Final    Standardized to IDMS reference method    eGFR 11/15/2023 81  ml/min/1.73sq m Final         Ayush Gerard PA-C  Rheumatology

## 2024-01-21 LAB — RHEUMATOID FACT SER QL LA: NEGATIVE

## 2024-01-23 LAB — CCP AB SER IA-ACNC: 1.9

## 2024-01-29 ENCOUNTER — OFFICE VISIT (OUTPATIENT)
Dept: OTOLARYNGOLOGY | Facility: CLINIC | Age: 64
End: 2024-01-29
Payer: MEDICARE

## 2024-01-29 VITALS — WEIGHT: 247 LBS | TEMPERATURE: 97.7 F | HEIGHT: 67 IN | BODY MASS INDEX: 38.77 KG/M2

## 2024-01-29 DIAGNOSIS — J34.3 HYPERTROPHY OF INFERIOR NASAL TURBINATE: ICD-10-CM

## 2024-01-29 DIAGNOSIS — M26.621 ARTHRALGIA OF RIGHT TEMPOROMANDIBULAR JOINT: ICD-10-CM

## 2024-01-29 DIAGNOSIS — J32.9 CHRONIC RHINOSINUSITIS: ICD-10-CM

## 2024-01-29 DIAGNOSIS — J34.89 SINUS PRESSURE: Primary | ICD-10-CM

## 2024-01-29 PROCEDURE — 31231 NASAL ENDOSCOPY DX: CPT | Performed by: STUDENT IN AN ORGANIZED HEALTH CARE EDUCATION/TRAINING PROGRAM

## 2024-01-29 PROCEDURE — 99214 OFFICE O/P EST MOD 30 MIN: CPT | Performed by: STUDENT IN AN ORGANIZED HEALTH CARE EDUCATION/TRAINING PROGRAM

## 2024-01-29 NOTE — PROGRESS NOTES
Otolaryngology-- Head and Neck Surgery Follow up visit      Follow up:    11/13/23:  She has a history of chronic sinusitis and has been experiencing sinus and facial pressure for the past two years. She sought care at an urgent care facility and was prescribed steroids, which provided relief from sinus pain but not the pressure. Currently, she is on Singulair and Claritin, in addition to using two nasal sprays. She does not report severe congestion or postnasal drip (PND). Notably, she has had ear tubes inserted 20 times and has undergone two sinus surgeries, with the most recent one being 15 years ago. She has not had recent allergy skin testing or a sinus CT scan.     1/29/24:  Here for CT scan results review. CT scan showed Minimal right maxillary sinus disease status post sinus surgery        Review of any relevant imaging:      Interval Review of systems: Pertinent review of systems documented in the HPI.    Interval Social History:  Social History     Socioeconomic History    Marital status: /Civil Union     Spouse name: Not on file    Number of children: Not on file    Years of education: Not on file    Highest education level: Not on file   Occupational History    Not on file   Tobacco Use    Smoking status: Never    Smokeless tobacco: Never   Vaping Use    Vaping status: Never Used   Substance and Sexual Activity    Alcohol use: No    Drug use: No    Sexual activity: Yes     Partners: Male     Birth control/protection: Female Sterilization   Other Topics Concern    Not on file   Social History Narrative    Always uses seat belt     Social Determinants of Health     Financial Resource Strain: Low Risk  (9/9/2023)    Overall Financial Resource Strain (CARDIA)     Difficulty of Paying Living Expenses: Not very hard   Food Insecurity: Not on file   Transportation Needs: No Transportation Needs (9/9/2023)    PRAPARE - Transportation     Lack of Transportation (Medical): No     Lack of Transportation  (Non-Medical): No   Physical Activity: Not on file   Stress: Not on file   Social Connections: Not on file   Intimate Partner Violence: Not on file   Housing Stability: Not on file       Interval Physical Examination:  There were no vitals taken for this visit.  Head: Atraumatic, no visible scalp lesions, parotid and submandibular salivary glands non-tender to palpation and without masses bilaterally.   Neck:  No visible or palpable cervical lesions or lymphadenopathy, thyroid gland is normal in size and symmetry and without masses, normal laryngeal elevation with swallowing.  Ears:    Right ear :  Auricles normal in appearance, mastoid prominence non-tender, external auditory canal clear. Tympanic membrane intact.   Left ear :  Auricles normal in appearance, mastoid prominence non-tender, external auditory canal clear. Tympanic membrane intact.   Nose/Sinuses:  External appearance unremarkable, no maxillary or frontal sinus tenderness to palpation bilaterally. Nasal endoscopic examination showed deviated nasal septum, bilateral enlarged inferior turbinates, no polyps, thick clear mucus, patent maxillary and ethmoid cavities    Oral Cavity:  Moist mucus membranes, gums and dentition unremarkable, no oral mucosal masses or lesions, floor of mouth soft, tongue mobile without masses or lesions.   Oropharynx:  Base of tongue soft and without masses, tonsils bilaterally unremarkable, soft palate mucosa unremarkable.       Abdomen: Soft and lax  Extremities: No bruises   Eyes:  Extra-ocular movements intact, pupils equally round and reactive to light and accommodation, the lids and conjunctivae are normal in appearance.  Constitutional:  Well developed, well nourished and groomed, in no acute distress.   Cardiovascular:  Normal rate and rhythm, no palpable thrills, no jugulovenous distension observed.  Respiratory:  Normal respiratory effort without evidence of retractions or use of accessory muscles.  Neurologic:   Cranial nerves II-XII intact bilaterally.  Psychiatric:  Alert and oriented to time, place and person.      Procedure:  Rigid nasal endoscopic examination:  The nasal cavities were decongested with lidocaine and oxymetazoline spray.  Bilateral nasal endoscopy was performed as follows:  Endoscopy type: 0 degree rigid scope  Results: look above  The patient tolerated the procedure well.      Assessment:  1. Sinus pressure        2. Hypertrophy of inferior nasal turbinate        3. Chronic rhinosinusitis            Plan:    She ceased taking all allergy medications and now relies on using plain Mucinex as required, which proves beneficial for her.    Temporomandibular Joint (TMJ) Dysfunction and Myofascial Pain Syndrome  Practice relaxation techniques like deep breathing, meditation, and yoga to reduce stress that may contribute to TMJ pain.  Apply heat or ice packs to the affected area to relieve pain and reduce inflammation.  Avoid foods that are hard to chew or require excessive jaw movements like chewing gum, hard candies, or tough meats.  Practice good posture, especially when sitting at a desk, to reduce strain on your neck, shoulders, and jaw.  Use over-the-counter pain relievers like ibuprofen or acetaminophen to manage pain.  Use a  or splint to prevent teeth grinding or clenching, which can worsen TMJ pain.  Avoid opening your mouth too wide, yawning, or singing loudly.  Physical therapy, including stretching and massage of the affected muscles, can help relieve TMJ pain.  In some cases, surgery may be necessary to correct structural issues causing TMJ pain.

## 2024-01-31 ENCOUNTER — HOSPITAL ENCOUNTER (OUTPATIENT)
Facility: AMBULARY SURGERY CENTER | Age: 64
Setting detail: OUTPATIENT SURGERY
Discharge: HOME | End: 2024-01-31
Attending: PHYSICAL MEDICINE & REHABILITATION | Admitting: PHYSICAL MEDICINE & REHABILITATION
Payer: MEDICARE

## 2024-01-31 ENCOUNTER — APPOINTMENT (OUTPATIENT)
Dept: RADIOLOGY | Facility: HOSPITAL | Age: 64
End: 2024-01-31
Payer: MEDICARE

## 2024-01-31 VITALS
HEART RATE: 58 BPM | RESPIRATION RATE: 18 BRPM | TEMPERATURE: 97.8 F | SYSTOLIC BLOOD PRESSURE: 119 MMHG | OXYGEN SATURATION: 95 % | DIASTOLIC BLOOD PRESSURE: 63 MMHG

## 2024-01-31 PROCEDURE — 64483 NJX AA&/STRD TFRM EPI L/S 1: CPT | Performed by: PHYSICAL MEDICINE & REHABILITATION

## 2024-01-31 PROCEDURE — NC001 PR NO CHARGE: Performed by: PHYSICAL MEDICINE & REHABILITATION

## 2024-01-31 RX ORDER — BUPIVACAINE HYDROCHLORIDE 2.5 MG/ML
INJECTION, SOLUTION EPIDURAL; INFILTRATION; INTRACAUDAL AS NEEDED
Status: DISCONTINUED | OUTPATIENT
Start: 2024-01-31 | End: 2024-01-31 | Stop reason: HOSPADM

## 2024-01-31 RX ORDER — METHYLPREDNISOLONE ACETATE 40 MG/ML
INJECTION, SUSPENSION INTRA-ARTICULAR; INTRALESIONAL; INTRAMUSCULAR; SOFT TISSUE AS NEEDED
Status: DISCONTINUED | OUTPATIENT
Start: 2024-01-31 | End: 2024-01-31 | Stop reason: HOSPADM

## 2024-01-31 RX ORDER — LIDOCAINE HYDROCHLORIDE 10 MG/ML
INJECTION, SOLUTION EPIDURAL; INFILTRATION; INTRACAUDAL; PERINEURAL AS NEEDED
Status: DISCONTINUED | OUTPATIENT
Start: 2024-01-31 | End: 2024-01-31 | Stop reason: HOSPADM

## 2024-01-31 NOTE — OP NOTE
OPERATIVE REPORT  PATIENT NAME: Caty Dang    :  1960  MRN: [id number]  Pt Location: [facility] MINOR/PAIN ROOM 01    SURGERY DATE: 2024    Surgeons and Role:     * Americo Darling DO - Primary    Preop Diagnosis:  Lumbar disc disease with radiculopathy [M51.16]    Post-Op Diagnosis Codes:     * Lumbar disc disease with radiculopathy [M51.16]    Procedure(s):  Bilateral - BILATERAL L3-L4 TRANSFORAMINAL EPIDURAL STEROID INJECTION    Indication: Leg pain  Preoperative diagnosis: Lumbar radiculitis  Postoperative diagnosis: Lumbar radiculitis  Procedure: Fluoroscopically-guided bilateral L3-L4 transforaminal epidural steroid injection under fluoroscopy  EBL: none  Specimens: not applicable  After discussing the risks, benefits, and alternatives to the procedure, the patient expressed understanding and wished to proceed. The patient was brought to the fluoroscopy suite and placed in the prone position. A procedural pause was conducted to verify: correct patient identity, procedure to be performed and as applicable, correct side and site, correct patient position, and availability of implants, special equipment and special requirements. After identifying the right L3 pedicle fluoroscopically with an oblique view, the skin was sterilely prepped and draped in the usual fashion using Chloraprep skin prep. The skin and subcutaneous tissues were anesthetized with 1% lidocaine. A 5 inch 22-gauge  spinal needle was then advanced under fluoroscopic guidance to the neural foramen. Appropriate foraminal depth was determined with a lateral fluoroscopic view, and AP visualization confirmed needle positioning at approximately the 6 o'clock position relative to the pedicle. After negative aspiration, Omnipaque 240 contrast was injected using live fluoroscopy confirming appropriate transforaminal spread without evidence of intravascular or intrathecal uptake.  Next, a 1.5 ml solution consisting of 20mg depomedrol and  0.25% bupivacaine was injected slowly and incrementally into the epidural space. Following the injection the needle was withdrawn slightly and flushed with lidocaine as it was fully extracted.  The procedure was then repeated in the exact same way on the opposite side at the same level.  The patient tolerated the procedure well and there were no apparent complications. The patient did not develop any new neurologic deficits. After appropriate observation, the patient was dismissed from the clinic in good condition under their own power.          SIGNATURE: Americo Darling,   DATE: January 31, 2024  TIME: 1:55 PM

## 2024-01-31 NOTE — PROGRESS NOTES
Consultation - Cardiology Office  [facility].    Caty Dang 63 y.o. female MRN: [id number]  : 1960  Unit/Bed#:  Encounter: [id number]      ASSESSMENT:  Essential hypertension  BP today is 148/84 with heart rate of 66/min  On amlodipine 5 mg, atenolol 100 mg, hydrochlorothiazide 25 mg  Will add 2.5 mg of amlodipine in the evening if systolic BP is more than 130    Obesity, BMI 38.37    Dyslipidemia  2021: , TG 93, HDL 44, normal liver enzymes 2024  10/03/2023: LDL 91, , HDL 39    Shortness of breath/dyspnea on exertion    Abnormal EKG    History of bilateral knee replacement    Rheumatoid arthritis    Pharmacologic nuclear stress test 2020:  Normal study, EF 62%    TTE, 2019:  EF 55%, mild LVH, G1 DD  Mild LAE, mild MI      RECOMMENDATIONS:  Take 2.5 mg amlodipine extra in the evening if systolic BP is more than 130 mmHg  Low-salt diet and weight loss strategies  Echocardiogram        Thank you for your consultation.  If you have any question please call me at [phone]      Primary Care Physician Requesting Consult: Joe Taylor MD      Reason for Consult / Principal Problem: Hypertension        HPI :     Caty Dang is a 63 y.o. year old female who was referred by primary care doctor for management of hypertension.  Patient is on amlodipine, hydrochlorothiazide and atenolol.  On most occasions her blood pressure is somewhat elevated.  Patient showed me a blood pressure diary from home with the blood pressure ranges from mid 140s to 173 systolic and on rare occasions it is down to 132 millimeters mercury systolic.  During an epidural injection yesterday systolic BP ranged from 119-128  Besides advising her on diet and weight loss and to continue her current medications, I also advised her to take an extra 2.5 mg of amlodipine in the evening if her systolic BP is more than 130      Review of Systems   Musculoskeletal:  Positive for back  "pain and gait problem.   All other systems reviewed and are negative.      Historical Information   Past Medical History:   Diagnosis Date    Allergic rhinitis     On amd off my whole life    Disease of thyroid gland     Years ago    Diverticulitis, colon     Ear problems     Started at age 2    Fatigue     GERD (gastroesophageal reflux disease) On and off    Headache(784.0)     HL (hearing loss)     Hypertension     Hypothyroidism     h/o \" borderline \"  issues    Liver cyst     last assessed 4/18/16    Lyme disease     Obesity     Long time    Otitis media     On and off since i was 2    Positive Anaplasma serology     RA (rheumatoid arthritis) (HCC)     Seizure disorder in pregnancy (HCC)     had 1 seizure x1 1986 during pregnancy- none after    Seizures (HCC) 1986    Tonsillitis     As a child    Varicose vein of leg      Past Surgical History:   Procedure Laterality Date    BACK SURGERY      fusion/refusion of 2-8 vertebrae    COLECTOMY      partial sigmoid, last assessed 4/18/16    EPIDURAL BLOCK INJECTION Bilateral 1/31/2024    Procedure: BILATERAL L3-L4 TRANSFORAMINAL EPIDURAL STEROID INJECTION;  Surgeon: Americo Darling DO;  Location: [facility] MAIN OR;  Service: Pain Management     JOINT REPLACEMENT      LAPAROSCOPY      with adnexectomy    LIVER SURGERY      UT SURGICAL ARTHROSCOPY LILLI W/CORACOACRM LIGM RLS Right 3/12/2020    Procedure: ARTHROSCOPY SHOULDER, BICEPS TENODESIS;  Surgeon: Teddy Keen MD;  Location: [facility] MAIN OR;  Service: Orthopedics    UT SURGICAL ARTHROSCOPY SHOULDER W/ROTATOR CUFF RPR Left 1/3/2019    Procedure: REPAIR ROTATOR CUFF  ARTHROSCOPIC, BICEPTS TENODESIS, SUBACROMIAL DECOMPRESION;  Surgeon: Teddy Keen MD;  Location: [facility] MAIN OR;  Service: Orthopedics    UT SURGICAL ARTHROSCOPY SHOULDER W/ROTATOR CUFF RPR Right 3/12/2020    Procedure: REPAIR ROTATOR CUFF  ARTHROSCOPIC WITH SUBACROMIAL DECOMPRESSION;  Surgeon: Teddy Keen MD;  Location: [facility] MAIN OR;  Service: " Orthopedics    REPLACEMENT TOTAL KNEE Bilateral     SINUS SURGERY      TONSILLECTOMY      TYMPANOSTOMY TUBE PLACEMENT       Social History     Substance and Sexual Activity   Alcohol Use No     Social History     Substance and Sexual Activity   Drug Use No     Social History     Tobacco Use   Smoking Status Never   Smokeless Tobacco Never     Family History:   Family History   Problem Relation Age of Onset    Hypertension Mother     Pancreatic cancer Mother 79    Cancer Mother         Pancreatic cancer    Arthritis Father     Hyperthyroidism Father     Hypertension Father     Rheum arthritis Father     No Known Problems Sister     No Known Problems Daughter     No Known Problems Daughter     No Known Problems Daughter     No Known Problems Maternal Grandmother     No Known Problems Maternal Grandfather     No Known Problems Paternal Grandmother     No Known Problems Paternal Grandfather     No Known Problems Brother     Rheum arthritis Brother     No Known Problems Maternal Aunt     No Known Problems Maternal Aunt     No Known Problems Paternal Aunt     No Known Problems Paternal Aunt     No Known Problems Paternal Uncle     ADD / ADHD Neg Hx     Anesthesia problems Neg Hx     Clotting disorder Neg Hx     Collagen disease Neg Hx     Diabetes Neg Hx     Dislocations Neg Hx     Learning disabilities Neg Hx     Neurological problems Neg Hx     Osteoporosis Neg Hx     Rheumatologic disease Neg Hx     Scoliosis Neg Hx     Vascular Disease Neg Hx        Meds/Allergies     Allergies   Allergen Reactions    Pollen Extract Other (See Comments)     headaches       Current Outpatient Medications:     amLODIPine (NORVASC) 2.5 mg tablet, Take 1 tablet (2.5 mg total) by mouth daily, Disp: 90 tablet, Rfl: 2    amLODIPine (NORVASC) 5 mg tablet, TAKE 1 TABLET (5 MG TOTAL) BY MOUTH DAILY., Disp: 90 tablet, Rfl: 1    atenolol (TENORMIN) 100 mg tablet, Take 1 tablet (100 mg total) by mouth daily, Disp: 90 tablet, Rfl: 3    Azelastine  "HCl 137 MCG/SPRAY SOLN, 1 spray by Each Nare route 2 (two) times a day for 600 doses As directed, Disp: 30 mL, Rfl: 2    celecoxib (CeleBREX) 100 mg capsule, Take 1 capsule (100 mg total) by mouth 2 (two) times a day, Disp: 60 capsule, Rfl: 3    fluticasone (FLONASE) 50 mcg/act nasal spray, 2 sprays into each nostril daily, Disp: 16 g, Rfl: 3    hydrochlorothiazide (HYDRODIURIL) 25 mg tablet, Take 1 tablet (25 mg total) by mouth daily, Disp: 90 tablet, Rfl: 6    hydroxychloroquine (PLAQUENIL) 200 mg tablet, Take 200 mg by mouth 2 (two) times a day with meals, Disp: , Rfl: 0    loratadine (Claritin) 10 mg tablet, Take 10 mg by mouth daily, Disp: , Rfl:     RESTASIS MULTIDOSE 0.05 % ophthalmic emulsion, Administer to both eyes every 12 (twelve) hours  , Disp: , Rfl:     traMADol (ULTRAM) 50 mg tablet, Take 50 mg by mouth every 6 (six) hours as needed for moderate pain, Disp: , Rfl:     montelukast (SINGULAIR) 10 mg tablet, Take 1 tablet (10 mg total) by mouth daily at bedtime, Disp: 30 tablet, Rfl: 3  No current facility-administered medications for this visit.    Vitals: Blood pressure 148/84, pulse 66, height 5' 7\" (1.702 m), weight 111 kg (245 lb), SpO2 97%.    Body mass index is 38.37 kg/m².  Vitals:    02/01/24 1020   Weight: 111 kg (245 lb)     BP Readings from Last 3 Encounters:   02/01/24 148/84   01/31/24 119/63   01/19/24 148/80       Physical Exam  PHYSICAL EXAMINATION:  Neurologic:  Alert & oriented x 3, no new focal deficits, Not in any acute distress,  Constitutional: Obese  Eyes:  Pupil equal and reacting to light, conjunctiva normal, No JVP, No LNP   HENT:  Atraumatic, oropharynx moist, Neck- normal range of motion, no tenderness,  Neck supple   Respiratory:  Bilateral air entry, mostly clear to auscultation  Cardiovascular: S1-S2 regular with a I/VI systolic murmur   GI:  Soft, nondistended, normal bowel sounds, nontender, no hepatosplenomegaly appreciated.  Musculoskeletal:  no deformities.   Skin:  " Well hydrated, no rash   Lymphatic:  No lymphadenopathy noted   Extremities:  No edema and distal pulses are present    Diagnostic Studies Review Cardio:      EKG: Normal sinus rhythm, heart rate 66/minute, left axis deviation, LVH    Cardiac testing:   Results for orders placed during the hospital encounter of 19    Echo complete with contrast if indicated    Narrative  [address] [address]  [phone]    Transthoracic Echocardiogram  2D, M-mode, Doppler, and Color Doppler    Study date:  2019    Patient: KARLA GRUBER  MR number: [id number]  Account number: [id number]  : 1960  Age: 58 years  Gender: Female  Status: Outpatient  Location: Echo lab  Height: 67 in  Weight: 248.4 lb  BP: 124/ 72 mmHg    Indications: Hypertension    Diagnoses: 401.9 - HYPERTENSION NOS    Sonographer:  FLORIAN Harrington  Primary Physician:  Jesse Oliveira MD  Referring Physician:  Woo Gilbert DO  Group:  [facility]  Interpreting Physician:  Milind Arteaga MD    SUMMARY    LEFT VENTRICLE:  Systolic function was normal. Ejection fraction was estimated in the range of 55 % to 60 % to be 55 %.  There were no regional wall motion abnormalities.  Wall thickness was mildly increased.  Doppler parameters were consistent with abnormal left ventricular relaxation (grade 1 diastolic dysfunction).    LEFT ATRIUM:  The atrium was mildly dilated.    TRICUSPID VALVE:  The tricuspid jet envelope definition was inadequate for estimation of RV systolic pressure. There are no indirect findings (abnormal RV volume or geometry, altered pulmonary flow velocity profile, or leftward septal displacement) which  would suggest moderate or severe pulmonary hypertension.    PULMONIC VALVE:  There was mild regurgitation.    HISTORY: PRIOR HISTORY: HTN    PROCEDURE: The procedure was performed in the echo lab. This was a routine study. The transthoracic approach  was used. The study included complete 2D imaging, M-mode, complete spectral Doppler, and color Doppler. The heart rate was 64 bpm,  at the start of the study. Image quality was adequate.    LEFT VENTRICLE: Size was normal. Systolic function was normal. Ejection fraction was estimated in the range of 55 % to 60 % to be 55 %. There were no regional wall motion abnormalities. Wall thickness was mildly increased. No evidence of  apical thrombus. DOPPLER: Doppler parameters were consistent with abnormal left ventricular relaxation (grade 1 diastolic dysfunction).    RIGHT VENTRICLE: The size was normal. Systolic function was normal. Wall thickness was normal.    LEFT ATRIUM: The atrium was mildly dilated.    RIGHT ATRIUM: Size was normal.    MITRAL VALVE: Valve structure was normal. There was normal leaflet separation. DOPPLER: The transmitral velocity was within the normal range. There was no evidence for stenosis. There was no significant regurgitation.    AORTIC VALVE: The valve was trileaflet. Leaflets exhibited normal thickness and normal cuspal separation. DOPPLER: Transaortic velocity was within the normal range. There was no evidence for stenosis. There was no significant  regurgitation.    TRICUSPID VALVE: The valve structure was normal. There was normal leaflet separation. DOPPLER: The transtricuspid velocity was within the normal range. There was no evidence for stenosis. There was no significant regurgitation. The  tricuspid jet envelope definition was inadequate for estimation of RV systolic pressure. There are no indirect findings (abnormal RV volume or geometry, altered pulmonary flow velocity profile, or leftward septal displacement) which would  suggest moderate or severe pulmonary hypertension.    PULMONIC VALVE: Leaflets exhibited normal thickness, no calcification, and normal cuspal separation. DOPPLER: The transpulmonic velocity was within the normal range. There was mild  regurgitation.    PERICARDIUM: There was no pericardial effusion. The pericardium was normal in appearance.    AORTA: The root exhibited normal size.    SYSTEMIC VEINS: IVC: The inferior vena cava was normal in size.    SYSTEM MEASUREMENT TABLES    2D mode  AoR Diam 2D: 3.3 cm  LA Diam (2D): 4.7 cm  LA/Ao (2D): 1.42  FS (2D Teich): 26.3 %  IVSd (2D): 1.26 cm  LVDEV: 139 cmï¾³  LVESV: 67.9 cmï¾³  LVIDd(2D): 5.36 cm  LVISd (2D): 3.95 cm  LVPWd (2D): 1.26 cm  SV (Teich): 71.1 cmï¾³    Apical four chamber  LVEF A4C: 53 %    Unspecified Scan Mode  MV Peak A Pipe: 948 mm/s  MV Peak E Pipe. Mean: 827 mm/s  MVA (PHT): 3.55 cmï¾²  PHT: 62 ms  RA Area: 16.6 cmï¾²  RA Volume: 36 cmï¾³  TAPSE: 2 cm    [facility] Accredited Echocardiography Laboratory    Prepared and electronically signed by    Milind Arteaga MD  Signed 10-Apr-2019 12:18:17      Results for orders placed during the hospital encounter of 20    NM myocardial perfusion spect (rx stress and/or rest)    [place] [id number] [phone]    Rest/Stress Gated SPECT Myocardial Perfusion Imaging After Exercise    Patient: KARLA GRUBER  MR number: [id number]  Account number: [id number]  : 1960  Age: 59 years  Gender: Female  Status: Outpatient  Location: Stress lab  Height: 67 in  Weight: 248 lb  BP: 136/ 78 mmHg    Allergies: POLLEN EXTRACT    Diagnosis: R06.02 - Shortness of breath, Z01.810 - Encounter for preprocedural cardiovascular examination    Primary Physician:  Jesse Oliveira MD  Technician:  Lindsay Luis  RN:  KARIME Gil  Referring Physician:  Key Solo MD  Group:  [facility]  Report Prepared By::  KARIME Gil  Interpreting Physician:  Milind Arteaga MD    INDICATIONS: Evaluation for coronary artery disease.    HISTORY: The patient is a 59 year old  female. Chest pain status: no chest pain. Other symptoms: dyspnea. Coronary  artery disease risk factors: dyslipidemia. Co-morbidity: obesity. Medications: a beta blocker, a calcium channel  blocker, a diuretic/and an antihypertensive agent.    PHYSICAL EXAM: Baseline physical exam screening: normal.    REST ECG: Sinus bradycardia.    PROCEDURE: The study was performed in the the Stress lab. Treadmill exercise testing was performed, using the Summer protocol. Gated SPECT myocardial perfusion imaging was performed after stress and at rest. Systolic blood pressure was 136  mmHg, at the start of the study. Diastolic blood pressure was 78 mmHg, at the start of the study. The heart rate was 58 bpm, at the start of the study. IV double checked.    SUMMER PROTOCOL:  HR bpm SBP mmHg DBP mmHg Symptoms Rhythm/conduct  Baseline 58 136 78 none sinus ying  Stage 1 99 150 80 none NSR  Stage 2 121 158 80 moderate dyspnea sinus tach  Stage 3 134 -- -- moderate dyspnea, moderate fatigue sinus tach, ventricular couplets  Immediate 118 176 82 same as above sinus tach  Recovery 1 92 160 80 subsiding --  Recovery 2 80 128 70 none NSR  No medications or fluids given.    STRESS SUMMARY: Duration of exercise was 8 min and 13 sec. The patient exercised to protocol stage 3. Maximal work rate was 10.1 METs. Maximal heart rate during stress was 134 bpm ( 84 % of maximal predicted heart rate). The heart rate  response to stress was normal. There was resting hypertension with an appropriate blood pressure response to stress. The rate-pressure product for the peak heart rate and blood pressure was 56118. There was no chest pain during stress. The  stress test was terminated due to achievement of maximal (symptom limited) exercise and achievement of target heart rate. Pre oxygen saturation: 100 %. Peak oxygen saturation: 100 %. The stress ECG was negative for ischemia and normal.  Arrhythmia during stress: isolated premature ventricular beats.    ISOTOPE ADMINISTRATION:  Resting isotope administration Stress isotope  administration  Agent Tetrofosmin Tetrofosmin  Dose 15.4 mCi 45 mCi  Date 02/11/2020 02/11/2020    Radiopharmaceutical was administered 8 min into the stress protocol. There was 1 min of exercise after the injection.    MYOCARDIAL PERFUSION IMAGING:  The image quality was good. Rotating projection images reveal prone imaging completed for attenuation correction.    PERFUSION DEFECTS:  -  There were no perfusion defects.    GATED SPECT:  The calculated left ventricular ejection fraction was 62 %. Left ventricular ejection fraction was within normal limits by visual estimate.    SUMMARY:  -  Stress results: Duration of exercise was 8 min and 13 sec. Maximal work rate was 10.1 METs. Maximal heart rate during stress was 134 bpm ( 84 % of maximal predicted heart rate). Target heart rate was not achieved. There was resting  hypertension with an appropriate blood pressure response to stress. There was no chest pain during stress.  -  ECG conclusions: The stress ECG was negative for ischemia and normal.  -  Perfusion imaging: There were no perfusion defects.  -  Gated SPECT: The calculated left ventricular ejection fraction was 62 %. Left ventricular ejection fraction was within normal limits by visual estimate.    IMPRESSIONS: Normal study after maximal exercise. Myocardial perfusion imaging was normal at rest and with stress.    Prepared and signed by    Milind Arteaga MD  Signed 02/12/2020 11:22:56        Imaging:  Chest X-Ray:   No Chest XR results available for this patient.    CT-scan of the chest:     No CTA results available for this patient.  Lab Review   Lab Results   Component Value Date    WBC 4.67 01/19/2024    HGB 14.2 01/19/2024    HCT 41.8 01/19/2024    MCV 94 01/19/2024    RDW 12.7 01/19/2024     01/19/2024     BMP:  Lab Results   Component Value Date    SODIUM 140 01/19/2024    K 3.7 01/19/2024     01/19/2024    CO2 30 01/19/2024    BUN 19 01/19/2024    CREATININE 0.91 01/19/2024    GLUC 88  "01/19/2024    GLUF 93 12/07/2018    CALCIUM 9.9 01/19/2024    EGFR 66 01/19/2024     LFT:  Lab Results   Component Value Date    AST 21 01/19/2024    ALT 23 01/19/2024    ALKPHOS 114 (H) 01/19/2024    TP 7.1 01/19/2024    ALB 4.6 01/19/2024      No results found for: \"[id number]\"  No components found for: \"TSH3\"  Lab Results   Component Value Date    HGBA1C 5.5 11/29/2021     Lipid Profile:   Lab Results   Component Value Date    CHOLESTEROL 175 11/29/2021    HDL 44 11/29/2021    LDLCALC 114 (H) 11/29/2021    TRIG 93 11/29/2021     Lab Results   Component Value Date    CHOLESTEROL 175 11/29/2021    CHOLESTEROL 174 08/25/2020     No results found for: \"CKTOTAL\", \"CKMB\", \"CKMBINDEX\", \"TROPONINI\"  No results found for: \"NTBNP\"   Recent Results (from the past 672 hour(s))   CBC and differential    Collection Time: 01/19/24 10:44 AM   Result Value Ref Range    WBC 4.67 4.31 - 10.16 Thousand/uL    RBC 4.45 3.81 - 5.12 Million/uL    Hemoglobin 14.2 11.5 - 15.4 g/dL    Hematocrit 41.8 34.8 - 46.1 %    MCV 94 82 - 98 fL    MCH 31.9 26.8 - 34.3 pg    MCHC 34.0 31.4 - 37.4 g/dL    RDW 12.7 11.6 - 15.1 %    MPV 9.7 8.9 - 12.7 fL    Platelets 233 149 - 390 Thousands/uL    nRBC 0 /100 WBCs    Neutrophils Relative 66 43 - 75 %    Immat GRANS % 0 0 - 2 %    Lymphocytes Relative 19 14 - 44 %    Monocytes Relative 10 4 - 12 %    Eosinophils Relative 4 0 - 6 %    Basophils Relative 1 0 - 1 %    Neutrophils Absolute 3.07 1.85 - 7.62 Thousands/µL    Immature Grans Absolute 0.01 0.00 - 0.20 Thousand/uL    Lymphocytes Absolute 0.90 0.60 - 4.47 Thousands/µL    Monocytes Absolute 0.48 0.17 - 1.22 Thousand/µL    Eosinophils Absolute 0.18 0.00 - 0.61 Thousand/µL    Basophils Absolute 0.03 0.00 - 0.10 Thousands/µL   Comprehensive metabolic panel    Collection Time: 01/19/24 10:44 AM   Result Value Ref Range    Sodium 140 135 - 147 mmol/L    Potassium 3.7 3.5 - 5.3 mmol/L    Chloride 103 96 - 108 mmol/L    CO2 30 21 - 32 mmol/L    ANION GAP 7 " "mmol/L    BUN 19 5 - 25 mg/dL    Creatinine 0.91 0.60 - 1.30 mg/dL    Glucose 88 65 - 140 mg/dL    Calcium 9.9 8.4 - 10.2 mg/dL    AST 21 13 - 39 U/L    ALT 23 7 - 52 U/L    Alkaline Phosphatase 114 (H) 34 - 104 U/L    Total Protein 7.1 6.4 - 8.4 g/dL    Albumin 4.6 3.5 - 5.0 g/dL    Total Bilirubin 0.63 0.20 - 1.00 mg/dL    eGFR 66 ml/min/1.73sq m   C-reactive protein    Collection Time: 01/19/24 10:44 AM   Result Value Ref Range    CRP <1.0 <3.0 mg/L   Sedimentation rate, automated    Collection Time: 01/19/24 10:44 AM   Result Value Ref Range    Sed Rate 4 0 - 29 mm/hour   RF Screen w/ Reflex to Titer    Collection Time: 01/19/24 10:44 AM   Result Value Ref Range    Rheumatoid Factor Negative Negative   Cyclic citrul peptide antibody, IgG    Collection Time: 01/19/24 10:44 AM   Result Value Ref Range    Cyclic Citrullinated Peptide Ab  1.9 See comment   MILAGROS Screen w/ Reflex to Titer/Pattern    Collection Time: 01/19/24 10:44 AM   Result Value Ref Range    MILAGROS Negative Negative   Chronic Hepatitis Panel    Collection Time: 01/19/24 10:44 AM   Result Value Ref Range    Hepatitis B Surface Ag Non-reactive Non-Reactive    Hepatitis C Ab Non-reactive Non-Reactive    Hep B C IgM Non-reactive Non-Reactive    Hep B Core Total Ab Non-reactive Non-Reactive           Dr. Jesse Maria MD, FACC      \"This note has been constructed using a voice recognition system.Therefore there may be syntax, spelling, and/or grammatical errors. Please call if you have any questions. \"  " "be syntax, spelling, and/or grammatical errors. Please call if you have any questions. \"  "

## 2024-01-31 NOTE — DISCHARGE INSTRUCTIONS
Epidural Steroid Injection   WHAT YOU NEED TO KNOW:   An epidural steroid injection (FAISAL) is a procedure to inject steroid medicine into the epidural space. The epidural space is between your spinal cord and vertebrae. Steroids reduce inflammation and fluid buildup in your spine that may be causing pain. You may be given pain medicine along with the steroids.          ACTIVITY  Do not drive or operate machinery today.  No strenuous activity today - bending, lifting, etc.  You may resume normal activites starting tomorrow - start slowly and as tolerated.  You may shower today, but no tub baths or hot tubs.  You may have numbness for several hours from the local anesthetic. Please use caution and common sense, especially with weight-bearing activities.    CARE OF THE INJECTION SITE  If you have soreness or pain, apply ice to the area today (20 minutes on/20 minutes off).  Starting tomorrow, you may use warm, moist heat or ice if needed.  You may have an increase or change in your discomfort for 36-48 hours after your treatment.  Apply ice and continue with any pain medication you have been prescribed.  Notify the [facility] if you have any of the following: redness, drainage, swelling, headache, stiff neck or fever above 100°F.    SPECIAL INSTRUCTIONS  Our office will contact you in approximately 7 days for a progress report.    MEDICATIONS  Continue to take all routine medications.  Our office may have instructed you to hold some medications.    As no general anesthesia was used in today's procedure, you should not experience any side effects related to anesthesia.     If you are diabetic, the steroids used in today's injection may temporarily increase your blood sugar levels after the first few days after your injection. Please keep a close eye on your sugars and alert the doctor who manages your diabetes if your sugars are significantly high from your baseline or you are symptomatic.     If you have a  problem specifically related to your procedure, please call our office at [phone].  Problems not related to your procedure should be directed to your primary care physician.

## 2024-01-31 NOTE — INTERVAL H&P NOTE
H&P reviewed. After examining the patient I find no changes in the patients condition since the H&P had been written.    Vitals:    01/31/24 1303   BP: 128/86   Pulse: 66   Resp: 18   Temp: (!) 96.7 °F (35.9 °C)   SpO2: 96%

## 2024-02-01 ENCOUNTER — OFFICE VISIT (OUTPATIENT)
Dept: CARDIOLOGY CLINIC | Facility: CLINIC | Age: 64
End: 2024-02-01
Payer: MEDICARE

## 2024-02-01 VITALS
HEIGHT: 67 IN | OXYGEN SATURATION: 97 % | HEART RATE: 66 BPM | DIASTOLIC BLOOD PRESSURE: 84 MMHG | WEIGHT: 245 LBS | BODY MASS INDEX: 38.45 KG/M2 | SYSTOLIC BLOOD PRESSURE: 148 MMHG

## 2024-02-01 DIAGNOSIS — E66.01 OBESITY, MORBID (HCC): ICD-10-CM

## 2024-02-01 DIAGNOSIS — I10 ESSENTIAL HYPERTENSION: Primary | ICD-10-CM

## 2024-02-01 DIAGNOSIS — R94.31 EKG ABNORMALITY: ICD-10-CM

## 2024-02-01 PROCEDURE — 93000 ELECTROCARDIOGRAM COMPLETE: CPT | Performed by: INTERNAL MEDICINE

## 2024-02-01 PROCEDURE — 99203 OFFICE O/P NEW LOW 30 MIN: CPT | Performed by: INTERNAL MEDICINE

## 2024-02-01 RX ORDER — AMLODIPINE BESYLATE 2.5 MG/1
2.5 TABLET ORAL DAILY
Qty: 90 TABLET | Refills: 2 | Status: SHIPPED | OUTPATIENT
Start: 2024-02-01

## 2024-02-05 ENCOUNTER — TELEPHONE (OUTPATIENT)
Dept: CARDIOLOGY CLINIC | Facility: CLINIC | Age: 64
End: 2024-02-05

## 2024-02-05 ENCOUNTER — HOSPITAL ENCOUNTER (OUTPATIENT)
Dept: NON INVASIVE DIAGNOSTICS | Facility: CLINIC | Age: 64
Discharge: HOME | End: 2024-02-05
Payer: MEDICARE

## 2024-02-05 VITALS
WEIGHT: 245 LBS | DIASTOLIC BLOOD PRESSURE: 84 MMHG | SYSTOLIC BLOOD PRESSURE: 148 MMHG | HEART RATE: 51 BPM | HEIGHT: 67 IN | BODY MASS INDEX: 38.45 KG/M2

## 2024-02-05 DIAGNOSIS — I10 ESSENTIAL HYPERTENSION: ICD-10-CM

## 2024-02-05 LAB
AORTIC ROOT: 3.1 CM
APICAL FOUR CHAMBER EJECTION FRACTION: 53 %
ASCENDING AORTA: 3.2 CM
BSA FOR ECHO PROCEDURE: 2.2 M2
E WAVE DECELERATION TIME: 195 MS
E/A RATIO: 0.88
FRACTIONAL SHORTENING: 33 (ref 28–44)
INTERVENTRICULAR SEPTUM IN DIASTOLE (PARASTERNAL SHORT AXIS VIEW): 1.1 CM
INTERVENTRICULAR SEPTUM: 1.1 CM (ref 0.6–1.1)
IVC: 1.3 MM
LAAS-AP2: 15 CM2
LAAS-AP4: 12.8 CM2
LEFT ATRIUM SIZE: 3.7 CM
LEFT ATRIUM VOLUME (MOD BIPLANE): 31 ML
LEFT ATRIUM VOLUME INDEX (MOD BIPLANE): 14.1 ML/M2
LEFT INTERNAL DIMENSION IN SYSTOLE: 3.6 CM (ref 2.1–4)
LEFT VENTRICULAR INTERNAL DIMENSION IN DIASTOLE: 5.4 CM (ref 3.5–6)
LEFT VENTRICULAR POSTERIOR WALL IN END DIASTOLE: 1.1 CM
LEFT VENTRICULAR STROKE VOLUME: 89 ML
LVSV (TEICH): 89 ML
MV E'TISSUE VEL-LAT: 7 CM/S
MV E'TISSUE VEL-SEP: 6 CM/S
MV PEAK A VEL: 0.82 M/S
MV PEAK E VEL: 72 CM/S
MV STENOSIS PRESSURE HALF TIME: 57 MS
MV VALVE AREA P 1/2 METHOD: 3.86
RA PRESSURE ESTIMATED: 8 MMHG
RIGHT ATRIUM AREA SYSTOLE A4C: 10.5 CM2
RIGHT VENTRICLE ID DIMENSION: 3 CM
SL CV LEFT ATRIUM LENGTH A2C: 5.4 CM
SL CV LV EF: 55
SL CV PED ECHO LEFT VENTRICLE DIASTOLIC VOLUME (MOD BIPLANE) 2D: 144 ML
SL CV PED ECHO LEFT VENTRICLE SYSTOLIC VOLUME (MOD BIPLANE) 2D: 55 ML
TRICUSPID ANNULAR PLANE SYSTOLIC EXCURSION: 2.5 CM

## 2024-02-05 PROCEDURE — 93306 TTE W/DOPPLER COMPLETE: CPT

## 2024-02-05 PROCEDURE — 93306 TTE W/DOPPLER COMPLETE: CPT | Performed by: INTERNAL MEDICINE

## 2024-02-05 NOTE — TELEPHONE ENCOUNTER
----- Message from Jesse Maria MD sent at 2/5/2024  2:52 PM EST -----  Please call and inform the patient that the Echocardiogram showed normal pumping function of the heart.    No significant valve abnormality was seen.

## 2024-02-07 ENCOUNTER — TELEPHONE (OUTPATIENT)
Dept: PAIN MEDICINE | Facility: CLINIC | Age: 64
End: 2024-02-07

## 2024-02-07 ENCOUNTER — TELEPHONE (OUTPATIENT)
Dept: CARDIOLOGY CLINIC | Facility: CLINIC | Age: 64
End: 2024-02-07

## 2024-02-07 NOTE — TELEPHONE ENCOUNTER
We received request for cardiac clearance for radical  resection left axilla soft tissues scheduled 03/01/2024.    Can you please advise on clearance?

## 2024-02-08 DIAGNOSIS — M06.9 RHEUMATOID ARTHRITIS INVOLVING MULTIPLE SITES, UNSPECIFIED WHETHER RHEUMATOID FACTOR PRESENT (HCC): Primary | ICD-10-CM

## 2024-02-08 RX ORDER — TRAMADOL HYDROCHLORIDE 50 MG/1
50 TABLET ORAL EVERY 6 HOURS PRN
Qty: 120 TABLET | Refills: 3 | Status: SHIPPED | OUTPATIENT
Start: 2024-02-08 | End: 2024-03-09

## 2024-02-08 NOTE — TELEPHONE ENCOUNTER
Patient seen in office January 2024  Last ordered by another provider. Please review. 3/12/2020 (7 hours) On license of [facility]  Refill must be reviewed and completed by the office or provider. The refill is unable to be approved / denied by the medication management team.

## 2024-02-09 ENCOUNTER — HOSPITAL ENCOUNTER (OUTPATIENT)
Dept: MRI IMAGING | Facility: HOSPITAL | Age: 64
End: 2024-02-09
Payer: MEDICARE

## 2024-02-09 DIAGNOSIS — I10 ESSENTIAL HYPERTENSION: ICD-10-CM

## 2024-02-09 DIAGNOSIS — D17.22 BENIGN LIPOMATOUS NEOPLASM OF SKIN AND SUBCUTANEOUS TISSUE OF LEFT ARM: ICD-10-CM

## 2024-02-09 PROCEDURE — 73223 MRI JOINT UPR EXTR W/O&W/DYE: CPT

## 2024-02-09 PROCEDURE — A9585 GADOBUTROL INJECTION: HCPCS | Performed by: RADIOLOGY

## 2024-02-09 PROCEDURE — G1004 CDSM NDSC: HCPCS

## 2024-02-09 RX ORDER — ATENOLOL 100 MG/1
100 TABLET ORAL DAILY
Qty: 90 TABLET | Refills: 0 | Status: SHIPPED | OUTPATIENT
Start: 2024-02-09

## 2024-02-09 RX ORDER — GADOBUTROL 604.72 MG/ML
11 INJECTION INTRAVENOUS
Status: COMPLETED | OUTPATIENT
Start: 2024-02-09 | End: 2024-02-09

## 2024-02-09 RX ADMIN — GADOBUTROL 11 ML: 604.72 INJECTION INTRAVENOUS at 15:09

## 2024-02-09 NOTE — TELEPHONE ENCOUNTER
Pt reports no improvement post inj   Pain level 5/10  Pt aware I will call next week for an update

## 2024-04-07 DIAGNOSIS — J34.89 SINUS PRESSURE: ICD-10-CM

## 2024-04-07 DIAGNOSIS — T78.40XA ALLERGY, INITIAL ENCOUNTER: ICD-10-CM

## 2024-04-07 DIAGNOSIS — J34.3 HYPERTROPHY OF INFERIOR NASAL TURBINATE: ICD-10-CM

## 2024-04-08 RX ORDER — FLUTICASONE PROPIONATE 50 MCG
SPRAY, SUSPENSION (ML) NASAL
Qty: 48 ML | Refills: 1 | Status: SHIPPED | OUTPATIENT
Start: 2024-04-08

## 2024-04-20 DIAGNOSIS — I10 ESSENTIAL HYPERTENSION: ICD-10-CM

## 2024-04-22 RX ORDER — ATENOLOL 100 MG/1
100 TABLET ORAL DAILY
Qty: 90 TABLET | Refills: 0 | Status: SHIPPED | OUTPATIENT
Start: 2024-04-22

## 2024-04-23 ENCOUNTER — OFFICE VISIT (OUTPATIENT)
Dept: RHEUMATOLOGY | Facility: CLINIC | Age: 64
End: 2024-04-23
Payer: MEDICARE

## 2024-04-23 ENCOUNTER — APPOINTMENT (OUTPATIENT)
Dept: LAB | Facility: CLINIC | Age: 64
End: 2024-04-23
Payer: MEDICARE

## 2024-04-23 VITALS
OXYGEN SATURATION: 100 % | HEART RATE: 60 BPM | WEIGHT: 248 LBS | DIASTOLIC BLOOD PRESSURE: 80 MMHG | BODY MASS INDEX: 38.92 KG/M2 | TEMPERATURE: 98.6 F | HEIGHT: 67 IN | SYSTOLIC BLOOD PRESSURE: 130 MMHG

## 2024-04-23 DIAGNOSIS — Z78.0 POSTMENOPAUSAL: ICD-10-CM

## 2024-04-23 DIAGNOSIS — Z13.820 ENCOUNTER FOR OSTEOPOROSIS SCREENING IN ASYMPTOMATIC POSTMENOPAUSAL PATIENT: ICD-10-CM

## 2024-04-23 DIAGNOSIS — M25.50 POLYARTHRALGIA: ICD-10-CM

## 2024-04-23 DIAGNOSIS — M15.9 OSTEOARTHRITIS OF MULTIPLE JOINTS, UNSPECIFIED OSTEOARTHRITIS TYPE: ICD-10-CM

## 2024-04-23 DIAGNOSIS — M06.9: ICD-10-CM

## 2024-04-23 DIAGNOSIS — Z79.52 LONG TERM SYSTEMIC STEROID USER: ICD-10-CM

## 2024-04-23 DIAGNOSIS — Z78.0 ENCOUNTER FOR OSTEOPOROSIS SCREENING IN ASYMPTOMATIC POSTMENOPAUSAL PATIENT: ICD-10-CM

## 2024-04-23 DIAGNOSIS — R53.83 OTHER FATIGUE: ICD-10-CM

## 2024-04-23 DIAGNOSIS — M06.00 SERONEGATIVE RHEUMATOID ARTHRITIS (HCC): Primary | ICD-10-CM

## 2024-04-23 LAB — 25(OH)D3 SERPL-MCNC: 24 NG/ML (ref 30–100)

## 2024-04-23 PROCEDURE — 82306 VITAMIN D 25 HYDROXY: CPT | Performed by: PHYSICIAN ASSISTANT

## 2024-04-23 PROCEDURE — 36415 COLL VENOUS BLD VENIPUNCTURE: CPT | Performed by: PHYSICIAN ASSISTANT

## 2024-04-23 PROCEDURE — 99214 OFFICE O/P EST MOD 30 MIN: CPT | Performed by: PHYSICIAN ASSISTANT

## 2024-04-23 RX ORDER — HYDROXYCHLOROQUINE SULFATE 200 MG/1
200 TABLET, FILM COATED ORAL 2 TIMES DAILY WITH MEALS
Qty: 90 TABLET | Refills: 3 | Status: SHIPPED | OUTPATIENT
Start: 2024-04-23 | End: 2024-07-22

## 2024-04-23 RX ORDER — GLUCOSAMINE HCL 500 MG
1 TABLET ORAL DAILY
COMMUNITY

## 2024-04-23 RX ORDER — TRIAMCINOLONE ACETONIDE 5 MG/G
1 CREAM TOPICAL 2 TIMES DAILY
COMMUNITY
Start: 2024-03-25

## 2024-04-23 RX ORDER — TRAMADOL HYDROCHLORIDE 50 MG/1
TABLET ORAL
COMMUNITY
Start: 2024-04-15

## 2024-04-23 RX ORDER — AMOXICILLIN 500 MG
CAPSULE ORAL
COMMUNITY

## 2024-04-23 NOTE — PROGRESS NOTES
Assessment and Plan:   Ms. Dang is a 63-year-old female who presents for rheumatology follow-up of seronegative erosive RA and OA.    Janett remains stable on  mg twice daily.  She remains off of NSAIDs due to ineffectiveness and GI upset, even with celecoxib better.  There is no active synovitis on exam and most recent inflammation markers within acceptable limits.  She is up-to-date on her Plaquenil toxicity monitoring with ophthalmology with last exam within normal limits from 12/14/2024.  Note reviewed.  Continue HCQ unchanged and eye exam on an annual basis.  Update routine labs just prior to next visit.  She may continue tramadol 50 mg twice daily for relief of OA pain.  Continue Restasis drops for dry eyes.    The patient has multiple risk factors for the development of osteoporosis including postmenopausal female, personal history of rheumatoid arthritis, and personal history of long-term steroid use.  I have ordered a baseline bone density scan for further evaluation.  Continue vitamin D3 3000 units once daily and update vitamin D 25-hydroxy.    Follow-up in 6 months, or sooner as needed    Plan:  Diagnoses and all orders for this visit:    Seronegative rheumatoid arthritis (HCC)  -     DXA bone density spine hip and pelvis  -     hydroxychloroquine (PLAQUENIL) 200 mg tablet; Take 1 tablet (200 mg total) by mouth 2 (two) times a day with meals  -     Vitamin D 25 hydroxy  -     CBC and differential; Future  -     C-reactive protein; Future  -     Comprehensive metabolic panel; Future  -     Sedimentation rate, automated; Future    Erosion of joint surface due to rheumatoid arthritis (HCC)  -     DXA bone density spine hip and pelvis  -     hydroxychloroquine (PLAQUENIL) 200 mg tablet; Take 1 tablet (200 mg total) by mouth 2 (two) times a day with meals  -     Vitamin D 25 hydroxy  -     CBC and differential; Future  -     Comprehensive metabolic panel; Future    Osteoarthritis of multiple joints,  unspecified osteoarthritis type  -     Vitamin D 25 hydroxy  -     CBC and differential; Future  -     Comprehensive metabolic panel; Future    Polyarthralgia  -     hydroxychloroquine (PLAQUENIL) 200 mg tablet; Take 1 tablet (200 mg total) by mouth 2 (two) times a day with meals  -     Vitamin D 25 hydroxy    Postmenopausal  -     DXA bone density spine hip and pelvis  -     Vitamin D 25 hydroxy    Encounter for osteoporosis screening in asymptomatic postmenopausal patient  -     DXA bone density spine hip and pelvis  -     Vitamin D 25 hydroxy  -     Comprehensive metabolic panel; Future    Long term systemic steroid user  -     DXA bone density spine hip and pelvis  -     Vitamin D 25 hydroxy  -     Comprehensive metabolic panel; Future    Other fatigue  -     Vitamin D 25 hydroxy  -     CBC and differential; Future  -     C-reactive protein; Future  -     Comprehensive metabolic panel; Future  -     Sedimentation rate, automated; Future    Other orders  -     Bioflavonoid Products (VITAMIN C PLUS PO); Take 1 capsule by mouth daily  -     Cholecalciferol (Vitamin D3) 75 MCG (3000 UT) TABS; Take 1 capsule by mouth daily  -     Omega-3 Fatty Acids (Fish Oil) 1200 MG CAPS; Take by mouth  -     dextromethorphan-guaifenesin (MUCINEX DM)  MG per 12 hr tablet; Take 1 tablet by mouth daily  -     Cyanocobalamin 5000 MCG/ML LIQD; Place under the tongue  -     Cyanocobalamin 100 MCG LOZG; Take 100 mcg by mouth daily  -     traMADol (ULTRAM) 50 mg tablet; TAKE 1 TABLET (50 MG TOTAL) BY MOUTH EVERY 6 (SIX) HOURS AS NEEDED FOR MODERATE PAIN  -     triamcinolone (KENALOG) 0.5 % cream; Apply 1 Application topically 2 (two) times a day To affected area        I have personally reviewed prior notes, recent laboratory results, and pertinent films in PACS.     Activities as tolerated.   Exercise: try to maintain a low impact exercise regimen as much as possible. Walk for 30 minutes a day for at least 3 days a week.   Continue  other medications as prescribed by PCP and other specialists.       RTC in 6 months    Rheumatic Disease Summary:  Seronegative erosive RA  -Initial visit 1/19/24: dx'd RA ~7 years ago by Dr. Carrera. Tried: HCQ, meloxicam, tramadol.  History of erosive arthritis of the MCPs on the left hand and degenerative OA.  No IA on exam.  Update labs.  Continue  BID.  Change meloxicam to Celebrex due to GI upset with meloxicam. Restasis drops for dry eyes  -Labs 1/19/2024: Neg MILAGROS, RF, CCP, ESR, CRP, hep panel  -Visit 4/23/2024: No IA. Cont  BID. Celebrex 100 mg PRN.  Restasis drops for dry eyes  OA  -s/p bilateral TKA  -Tramadol 50 twice daily (Continued from previous rheumatologist)  Osteoporosis screening asymptomatic post menopausal female  -RFs: postmenopausal female, personal history of RA, and personal history of long-term steroid use. No hx fx  -Visit 4/23/24: Ordered b/l DEXA, cont vit D 3K units QD  Other comorbidities: lumbar radiculopathy, right rotator cuff tear, left rotator cuff tear, cervical disc herniations, L4-L5 fusion, history of diverticulosis, fatty liver, multicystic liver disease, and HTN        HPI  Ms. Dang is a 63-year-old female who presents for rheumatology follow-up of seronegative erosive RA and OA.    Morning stiffness less than 1 hour.  No recent joint swelling.  Had epidural injections of L1-L2 since the last visit which did not help and now she is going back for other lumbar epidural injections soon.  Hopeful that this will help with her low back, hip, and thigh pain.  Had shoulder surgery and excision of lipoma of left axilla.    +Fatigue.    No history of fractures    The following portions of the patient's history were reviewed and updated as appropriate: allergies, current medications, past family history, past medical history, past social history, past surgical history and problem list.      Review of Systems  Constitutional: Negative for weight change, fevers, chills,  "night sweats  ENT/Mouth: +dry eye, dry mouth. Negative for hearing changes, ear pain, nasal congestion, sinus pain, hoarseness, sore throat, rhinorrhea, swallowing difficulty.   Eyes: Negative for pain, redness, discharge, vision changes.   Cardiovascular: Negative for chest pain, SOB, palpitations.   Respiratory: Negative for cough, sputum, wheezing, dyspnea.   Gastrointestinal: Negative for nausea, vomiting, diarrhea, constipation, pain, heartburn.  Genitourinary: Negative for dysuria, urinary frequency, hematuria.   Musculoskeletal: As per HPI.  Skin: +color changes. Negative for skin rash.   Neuro: Negative for weakness, numbness, tingling, loss of consciousness.   Psych: Negative for anxiety, depression.   Heme/Lymph: Negative for easy bruising, bleeding, lymphadenopathy.        Past Medical History:   Diagnosis Date    Allergic rhinitis     On amd off my whole life    Disease of thyroid gland     Years ago    Diverticulitis, colon     Ear problems     Started at age 2    Fatigue     GERD (gastroesophageal reflux disease) On and off    Headache(784.0)     HL (hearing loss)     Hypertension     Hypothyroidism     h/o \" borderline \"  issues    Lipoma of left shoulder 03/01/2024    Liver cyst     last assessed 4/18/16    Lyme disease     Obesity     Long time    Otitis media     On and off since i was 2    Positive Anaplasma serology     RA (rheumatoid arthritis) (HCC)     Seizure disorder in pregnancy (HCC)     had 1 seizure x1 1986 during pregnancy- none after    Seizures (HCC) 1986    Tonsillitis     As a child    Varicose vein of leg        Past Surgical History:   Procedure Laterality Date    BACK SURGERY      fusion/refusion of 2-8 vertebrae    COLECTOMY      partial sigmoid, last assessed 4/18/16    EPIDURAL BLOCK INJECTION Bilateral 1/31/2024    Procedure: BILATERAL L3-L4 TRANSFORAMINAL EPIDURAL STEROID INJECTION;  Surgeon: Americo Darling DO;  Location: [facility] MAIN OR;  Service: Pain Management     " JOINT REPLACEMENT      LAPAROSCOPY      with adnexectomy    LIVER SURGERY      [place] SURGICAL ARTHROSCOPY LILLI W/CORACOACRM LIGM RLS Right 3/12/2020    Procedure: ARTHROSCOPY SHOULDER, BICEPS TENODESIS;  Surgeon: Teddy Keen MD;  Location: [facility] MAIN OR;  Service: Orthopedics    MN SURGICAL ARTHROSCOPY SHOULDER W/ROTATOR CUFF RPR Left 1/3/2019    Procedure: REPAIR ROTATOR CUFF  ARTHROSCOPIC, BICEPTS TENODESIS, SUBACROMIAL DECOMPRESION;  Surgeon: Teddy Keen MD;  Location: [facility] MAIN OR;  Service: Orthopedics    MN SURGICAL ARTHROSCOPY SHOULDER W/ROTATOR CUFF RPR Right 3/12/2020    Procedure: REPAIR ROTATOR CUFF  ARTHROSCOPIC WITH SUBACROMIAL DECOMPRESSION;  Surgeon: Teddy Keen MD;  Location: [facility] MAIN OR;  Service: Orthopedics    REPLACEMENT TOTAL KNEE Bilateral     SINUS SURGERY      TONSILLECTOMY      TYMPANOSTOMY TUBE PLACEMENT         Social History     Socioeconomic History    Marital status: /Civil Union     Spouse name: Not on file    Number of children: Not on file    Years of education: Not on file    Highest education level: Not on file   Occupational History    Not on file   Tobacco Use    Smoking status: Never    Smokeless tobacco: Never   Vaping Use    Vaping status: Never Used   Substance and Sexual Activity    Alcohol use: No    Drug use: No    Sexual activity: Yes     Partners: Male     Birth control/protection: Female Sterilization   Other Topics Concern    Not on file   Social History Narrative    Always uses seat belt     Social Determinants of Health     Financial Resource Strain: Low Risk  (9/9/2023)    Overall Financial Resource Strain (CARDIA)     Difficulty of Paying Living Expenses: Not very hard   Food Insecurity: Not on file   Transportation Needs: No Transportation Needs (9/9/2023)    PRAPARE - Transportation     Lack of Transportation (Medical): No     Lack of Transportation (Non-Medical): No   Physical Activity: Not on file   Stress: Not on file   Social Connections:  Not on file   Intimate Partner Violence: Not on file   Housing Stability: Not on file       Family History   Problem Relation Age of Onset    Hypertension Mother     Pancreatic cancer Mother 79    Cancer Mother         Pancreatic cancer    Arthritis Father     Hyperthyroidism Father     Hypertension Father     Rheum arthritis Father     No Known Problems Sister     No Known Problems Daughter     No Known Problems Daughter     No Known Problems Daughter     No Known Problems Maternal Grandmother     No Known Problems Maternal Grandfather     No Known Problems Paternal Grandmother     No Known Problems Paternal Grandfather     No Known Problems Brother     Rheum arthritis Brother     No Known Problems Maternal Aunt     No Known Problems Maternal Aunt     No Known Problems Paternal Aunt     No Known Problems Paternal Aunt     No Known Problems Paternal Uncle     ADD / ADHD Neg Hx     Anesthesia problems Neg Hx     Clotting disorder Neg Hx     Collagen disease Neg Hx     Diabetes Neg Hx     Dislocations Neg Hx     Learning disabilities Neg Hx     Neurological problems Neg Hx     Osteoporosis Neg Hx     Rheumatologic disease Neg Hx     Scoliosis Neg Hx     Vascular Disease Neg Hx        Allergies   Allergen Reactions    Bee Pollen Other (See Comments)     headaches    headaches   headaches    Pollen Extract Other (See Comments)     headaches         Current Outpatient Medications:     amLODIPine (NORVASC) 2.5 mg tablet, Take 1 tablet (2.5 mg total) by mouth daily, Disp: 90 tablet, Rfl: 2    amLODIPine (NORVASC) 5 mg tablet, TAKE 1 TABLET (5 MG TOTAL) BY MOUTH DAILY., Disp: 90 tablet, Rfl: 1    atenolol (TENORMIN) 100 mg tablet, TAKE 1 TABLET BY MOUTH EVERY DAY, Disp: 90 tablet, Rfl: 0    Bioflavonoid Products (VITAMIN C PLUS PO), Take 1 capsule by mouth daily, Disp: , Rfl:     Cholecalciferol (Vitamin D3) 75 MCG (3000 UT) TABS, Take 1 capsule by mouth daily, Disp: , Rfl:     Cyanocobalamin 100 MCG LOZG, Take 100 mcg by  "mouth daily, Disp: , Rfl:     Cyanocobalamin 5000 MCG/ML LIQD, Place under the tongue, Disp: , Rfl:     dextromethorphan-guaifenesin (MUCINEX DM)  MG per 12 hr tablet, Take 1 tablet by mouth daily, Disp: , Rfl:     fluticasone (FLONASE) 50 mcg/act nasal spray, SPRAY 2 SPRAYS INTO EACH NOSTRIL EVERY DAY, Disp: 48 mL, Rfl: 1    hydrochlorothiazide (HYDRODIURIL) 25 mg tablet, Take 1 tablet (25 mg total) by mouth daily, Disp: 90 tablet, Rfl: 6    hydroxychloroquine (PLAQUENIL) 200 mg tablet, Take 1 tablet (200 mg total) by mouth 2 (two) times a day with meals, Disp: 90 tablet, Rfl: 3    loratadine (Claritin) 10 mg tablet, Take 10 mg by mouth daily, Disp: , Rfl:     Omega-3 Fatty Acids (Fish Oil) 1200 MG CAPS, Take by mouth, Disp: , Rfl:     RESTASIS MULTIDOSE 0.05 % ophthalmic emulsion, Administer to both eyes every 12 (twelve) hours  , Disp: , Rfl:     traMADol (ULTRAM) 50 mg tablet, TAKE 1 TABLET (50 MG TOTAL) BY MOUTH EVERY 6 (SIX) HOURS AS NEEDED FOR MODERATE PAIN, Disp: , Rfl:     triamcinolone (KENALOG) 0.5 % cream, Apply 1 Application topically 2 (two) times a day To affected area, Disp: , Rfl:     Azelastine HCl 137 MCG/SPRAY SOLN, 1 spray by Each Nare route 2 (two) times a day for 600 doses As directed (Patient not taking: Reported on 4/23/2024), Disp: 30 mL, Rfl: 2      Objective:    Vitals:    04/23/24 0850   BP: 130/80   Pulse: 60   Temp: 98.6 °F (37 °C)   SpO2: 100%   Weight: 112 kg (248 lb)   Height: 5' 7\" (1.702 m)       Physical Exam  General: Well appearing, well nourished, in no acute distress. Oriented x 3, normal mood and affect.  Ambulating without difficulty.  Skin: Good turgor, no rash, unusual bruising or prominent lesions.  Hair: Normal texture and distribution.  Nails: Normal color, no deformities.  HEENT:  Head: Normocephalic, atraumatic.  Eyes: Conjunctiva clear, sclera non-icteric, EOM intact.  Nose: No external lesions, mucosa non-inflamed.  Mouth: Mucous membranes moist, no mucosal " lesions.  Neck: Supple  Musculoskeletal:   No tenderness to palpation or swelling of joints bilaterally to include: shoulder, elbow, wrist, MCP I-V, PIP I-V, knee, ankle  Neurologic: Alert and oriented. No focal neurological deficits appreciated.   Psychiatric: Normal mood and affect.       Ayush Gerard PA-C  Rheumatology

## 2024-04-26 ENCOUNTER — OFFICE VISIT (OUTPATIENT)
Dept: PAIN MEDICINE | Facility: CLINIC | Age: 64
End: 2024-04-26
Payer: MEDICARE

## 2024-04-26 ENCOUNTER — TELEPHONE (OUTPATIENT)
Dept: PAIN MEDICINE | Facility: CLINIC | Age: 64
End: 2024-04-26

## 2024-04-26 VITALS
BODY MASS INDEX: 38.77 KG/M2 | HEIGHT: 67 IN | DIASTOLIC BLOOD PRESSURE: 82 MMHG | HEART RATE: 78 BPM | SYSTOLIC BLOOD PRESSURE: 157 MMHG | WEIGHT: 247 LBS

## 2024-04-26 DIAGNOSIS — G89.4 CHRONIC PAIN SYNDROME: ICD-10-CM

## 2024-04-26 DIAGNOSIS — M96.1 POST LAMINECTOMY SYNDROME: ICD-10-CM

## 2024-04-26 DIAGNOSIS — M54.16 LUMBAR RADICULOPATHY: Primary | ICD-10-CM

## 2024-04-26 PROCEDURE — 99214 OFFICE O/P EST MOD 30 MIN: CPT | Performed by: PHYSICAL MEDICINE & REHABILITATION

## 2024-04-26 NOTE — PROGRESS NOTES
Pain Medicine Follow-Up Note    Assessment:  1. Lumbar radiculopathy    2. Chronic pain syndrome    3. Post laminectomy syndrome        Plan:  Ms. Dang is a pleasant 63-year-old female significant past medical history of L4-L5 lumbar fusion presents to [facility] pain [facility] for follow-up and reevaluation regarding ongoing low back pain with radiating symptoms into bilateral lower extremities.  We previously performed bilateral L3-L4 TFESI which unfortunately did not provide any significant improvements in her leg pain.  She does report some relief in the hips but continues to report ongoing low back and leg pain that has greatly impacted her quality of life and actives of daily living.  Extensive conversation regarding next steps including further interventional approaches and neuromodulation were discussed.  At this time we will plan for a caudal epidural steroid injection under fluoroscopy guidance.  We also discussed Med[employer] spinal cord stimulator trial and she is interested to receive further education and consider her options.  For now we will provide her with [employer] information and will consider this in the future if the caudal does not provide significant improvement in her pain.  All questions answered, patient is agreeable with plan.  Complete risks and benefits including bleeding, infection, tissue reaction, nerve injury and allergic reaction were discussed. The approach was demonstrated using models and literature was provided. Verbal and written consent was obtained.    History of Present Illness:    Caty Dang is a 63 y.o. female who presents to [facility] for interval re-evaluation of the above stated pain complaints. The patient has a past medical and chronic pain history as outlined in the assessment section.  Patient presents to [facility] for follow-up and reevaluation regarding ongoing low back pain with rating  "symptoms in the bilateral buttocks.  Currently rates her pain 6 out of 10 and described as a constant burning, dull aching, numbness, pressure-like sensation that is worse in the evening.  We did previously performed bilateral L3-L4 TFESI January 31, 2024 with minimal improvements in her pain.  Presents today for follow-up and reevaluation.    Other than as stated above, the patient denies any interval changes in medications, medical condition, mental condition, symptoms, or allergies since the last office visit.         Review of Systems:    Review of Systems   Constitutional:  Negative for unexpected weight change.   HENT:  Negative for ear pain.    Eyes:  Negative for visual disturbance.   Respiratory:  Negative for shortness of breath and wheezing.    Gastrointestinal:  Negative for abdominal pain.   Musculoskeletal:  Positive for back pain and gait problem.        Decreased ROM, joint pain and muscle pain, joint stiffness   Neurological:  Positive for weakness. Negative for numbness.   Psychiatric/Behavioral:  Positive for sleep disturbance. Negative for decreased concentration. The patient is nervous/anxious.          Past Medical History:   Diagnosis Date    Allergic rhinitis     On amd off my whole life    Disease of thyroid gland     Years ago    Diverticulitis, colon     Ear problems     Started at age 2    Fatigue     GERD (gastroesophageal reflux disease) On and off    Headache(784.0)     HL (hearing loss)     Hypertension     Hypothyroidism     h/o \" borderline \"  issues    Lipoma of left shoulder 03/01/2024    Liver cyst     last assessed 4/18/16    Lyme disease     Obesity     Long time    Otitis media     On and off since i was 2    Positive Anaplasma serology     RA (rheumatoid arthritis) ([facility])     Seizure disorder in pregnancy ([facility])     had 1 seizure x1 1986 during pregnancy- none after    Seizures ([facility]) 1986    Tonsillitis     As a child    Varicose vein of leg        Past Surgical History: "   Procedure Laterality Date    BACK SURGERY      fusion/refusion of 2-8 vertebrae    COLECTOMY      partial sigmoid, last assessed 4/18/16    EPIDURAL BLOCK INJECTION Bilateral 1/31/2024    Procedure: BILATERAL L3-L4 TRANSFORAMINAL EPIDURAL STEROID INJECTION;  Surgeon: Americo Darling DO;  Location: [facility] MAIN OR;  Service: Pain Management     JOINT REPLACEMENT      LAPAROSCOPY      with adnexectomy    LIVER SURGERY      [place] SURGICAL ARTHROSCOPY LILLI W/CORACOACRM LIGM RLS Right 3/12/2020    Procedure: ARTHROSCOPY SHOULDER, BICEPS TENODESIS;  Surgeon: Teddy Keen MD;  Location: [facility] MAIN OR;  Service: Orthopedics    AZ SURGICAL ARTHROSCOPY SHOULDER W/ROTATOR CUFF RPR Left 1/3/2019    Procedure: REPAIR ROTATOR CUFF  ARTHROSCOPIC, BICEPTS TENODESIS, SUBACROMIAL DECOMPRESION;  Surgeon: Teddy Keen MD;  Location: [facility] MAIN OR;  Service: Orthopedics    [place] SURGICAL ARTHROSCOPY SHOULDER W/ROTATOR CUFF RPR Right 3/12/2020    Procedure: REPAIR ROTATOR CUFF  ARTHROSCOPIC WITH SUBACROMIAL DECOMPRESSION;  Surgeon: Teddy Keen MD;  Location: [facility] MAIN OR;  Service: Orthopedics    REPLACEMENT TOTAL KNEE Bilateral     SINUS SURGERY      TONSILLECTOMY      TYMPANOSTOMY TUBE PLACEMENT         Family History   Problem Relation Age of Onset    Hypertension Mother     Pancreatic cancer Mother 79    Cancer Mother         Pancreatic cancer    Arthritis Father     Hyperthyroidism Father     Hypertension Father     Rheum arthritis Father     No Known Problems Sister     No Known Problems Daughter     No Known Problems Daughter     No Known Problems Daughter     No Known Problems Maternal Grandmother     No Known Problems Maternal Grandfather     No Known Problems Paternal Grandmother     No Known Problems Paternal Grandfather     No Known Problems Brother     Rheum arthritis Brother     No Known Problems Maternal Aunt     No Known Problems Maternal Aunt     No Known Problems Paternal Aunt     No Known Problems Paternal  Aunt     No Known Problems Paternal Uncle     ADD / ADHD Neg Hx     Anesthesia problems Neg Hx     Clotting disorder Neg Hx     Collagen disease Neg Hx     Diabetes Neg Hx     Dislocations Neg Hx     Learning disabilities Neg Hx     Neurological problems Neg Hx     Osteoporosis Neg Hx     Rheumatologic disease Neg Hx     Scoliosis Neg Hx     Vascular Disease Neg Hx        Social History     Occupational History    Not on file   Tobacco Use    Smoking status: Never    Smokeless tobacco: Never   Vaping Use    Vaping status: Never Used   Substance and Sexual Activity    Alcohol use: No    Drug use: No    Sexual activity: Yes     Partners: Male     Birth control/protection: Female Sterilization         Current Outpatient Medications:     amLODIPine (NORVASC) 2.5 mg tablet, Take 1 tablet (2.5 mg total) by mouth daily, Disp: 90 tablet, Rfl: 2    amLODIPine (NORVASC) 5 mg tablet, TAKE 1 TABLET (5 MG TOTAL) BY MOUTH DAILY., Disp: 90 tablet, Rfl: 1    atenolol (TENORMIN) 100 mg tablet, TAKE 1 TABLET BY MOUTH EVERY DAY, Disp: 90 tablet, Rfl: 0    Bioflavonoid Products (VITAMIN C PLUS PO), Take 1 capsule by mouth daily, Disp: , Rfl:     Cholecalciferol (Vitamin D3) 75 MCG (3000 UT) TABS, Take 1 capsule by mouth daily, Disp: , Rfl:     Cyanocobalamin 100 MCG LOZG, Take 100 mcg by mouth daily, Disp: , Rfl:     Cyanocobalamin 5000 MCG/ML LIQD, Place under the tongue, Disp: , Rfl:     dextromethorphan-guaifenesin (MUCINEX DM)  MG per 12 hr tablet, Take 1 tablet by mouth daily, Disp: , Rfl:     fluticasone (FLONASE) 50 mcg/act nasal spray, SPRAY 2 SPRAYS INTO EACH NOSTRIL EVERY DAY, Disp: 48 mL, Rfl: 1    hydrochlorothiazide (HYDRODIURIL) 25 mg tablet, Take 1 tablet (25 mg total) by mouth daily, Disp: 90 tablet, Rfl: 6    hydroxychloroquine (PLAQUENIL) 200 mg tablet, Take 1 tablet (200 mg total) by mouth 2 (two) times a day with meals, Disp: 90 tablet, Rfl: 3    loratadine (Claritin) 10 mg tablet, Take 10 mg by mouth daily,  "Disp: , Rfl:     Omega-3 Fatty Acids (Fish Oil) 1200 MG CAPS, Take by mouth, Disp: , Rfl:     RESTASIS MULTIDOSE 0.05 % ophthalmic emulsion, Administer to both eyes every 12 (twelve) hours  , Disp: , Rfl:     traMADol (ULTRAM) 50 mg tablet, TAKE 1 TABLET (50 MG TOTAL) BY MOUTH EVERY 6 (SIX) HOURS AS NEEDED FOR MODERATE PAIN, Disp: , Rfl:     triamcinolone (KENALOG) 0.5 % cream, Apply 1 Application topically 2 (two) times a day To affected area, Disp: , Rfl:     Azelastine HCl 137 MCG/SPRAY SOLN, 1 spray by Each Nare route 2 (two) times a day for 600 doses As directed (Patient not taking: Reported on 4/23/2024), Disp: 30 mL, Rfl: 2    Allergies   Allergen Reactions    Bee Pollen Other (See Comments)     headaches    headaches   headaches    Pollen Extract Other (See Comments)     headaches       Physical Exam:    /82   Pulse 78   Ht 5' 7\" (1.702 m)   Wt 112 kg (247 lb)   BMI 38.69 kg/m²     Constitutional:normal, well developed, well nourished, alert, in no distress and non-toxic and no overt pain behavior.  Eyes:anicteric  HEENT:grossly intact  Neck:supple, symmetric, trachea midline and no masses   Pulmonary:even and unlabored  Cardiovascular:No edema or pitting edema present  Skin:Normal without rashes or lesions and well hydrated  Psychiatric:Mood and affect appropriate  Neurologic:Cranial Nerves II-XII grossly intact  Musculoskeletal:antalgic      Imaging  No orders to display         No orders of the defined types were placed in this encounter.    "

## 2024-04-26 NOTE — TELEPHONE ENCOUNTER
Scheduled patient for CAUDAL FAISAL 05/29/24  Patient denies RX blood thinners/ NSAIDS  Nothing to eat or drink 1 hour prior to procedure  Needs to arrange transportation  Proper clothing for procedure  No vaccines 2 weeks prior or after procedure  If ill or place on antibiotics, please call to reschedule

## 2024-05-28 ENCOUNTER — TELEPHONE (OUTPATIENT)
Age: 64
End: 2024-05-28

## 2024-05-28 NOTE — TELEPHONE ENCOUNTER
S/w patient, she is having shoulder surgery on 06/13/24. She would like to cancel procedure for 06/12. Procedure cancelled.

## 2024-06-10 DIAGNOSIS — M25.50 POLYARTHRALGIA: ICD-10-CM

## 2024-06-10 DIAGNOSIS — M15.9 OSTEOARTHRITIS OF MULTIPLE JOINTS, UNSPECIFIED OSTEOARTHRITIS TYPE: ICD-10-CM

## 2024-06-10 DIAGNOSIS — M06.00 SERONEGATIVE RHEUMATOID ARTHRITIS (HCC): Primary | ICD-10-CM

## 2024-06-10 RX ORDER — TRAMADOL HYDROCHLORIDE 50 MG/1
50 TABLET ORAL 2 TIMES DAILY
Qty: 60 TABLET | Refills: 2 | Status: SHIPPED | OUTPATIENT
Start: 2024-06-10 | End: 2024-07-10

## 2024-06-16 ENCOUNTER — APPOINTMENT (EMERGENCY)
Dept: RADIOLOGY | Facility: HOSPITAL | Age: 64
End: 2024-06-16
Payer: MEDICARE

## 2024-06-16 ENCOUNTER — HOSPITAL ENCOUNTER (EMERGENCY)
Facility: HOSPITAL | Age: 64
Discharge: HOME | End: 2024-06-16
Attending: STUDENT IN AN ORGANIZED HEALTH CARE EDUCATION/TRAINING PROGRAM | Admitting: STUDENT IN AN ORGANIZED HEALTH CARE EDUCATION/TRAINING PROGRAM
Payer: MEDICARE

## 2024-06-16 VITALS
OXYGEN SATURATION: 100 % | RESPIRATION RATE: 16 BRPM | TEMPERATURE: 98.7 F | WEIGHT: 244.2 LBS | HEART RATE: 66 BPM | DIASTOLIC BLOOD PRESSURE: 98 MMHG | SYSTOLIC BLOOD PRESSURE: 178 MMHG | BODY MASS INDEX: 38.25 KG/M2

## 2024-06-16 DIAGNOSIS — M79.18 PAIN OF MUSCLE OF PELVIS: Primary | ICD-10-CM

## 2024-06-16 DIAGNOSIS — I10 HYPERTENSION: ICD-10-CM

## 2024-06-16 PROCEDURE — 73502 X-RAY EXAM HIP UNI 2-3 VIEWS: CPT

## 2024-06-16 PROCEDURE — 96372 THER/PROPH/DIAG INJ SC/IM: CPT

## 2024-06-16 PROCEDURE — 99284 EMERGENCY DEPT VISIT MOD MDM: CPT | Performed by: STUDENT IN AN ORGANIZED HEALTH CARE EDUCATION/TRAINING PROGRAM

## 2024-06-16 PROCEDURE — 99283 EMERGENCY DEPT VISIT LOW MDM: CPT

## 2024-06-16 RX ORDER — NAPROXEN 500 MG/1
500 TABLET ORAL 2 TIMES DAILY WITH MEALS
Qty: 14 TABLET | Refills: 0 | Status: SHIPPED | OUTPATIENT
Start: 2024-06-16 | End: 2024-06-23

## 2024-06-16 RX ORDER — METHOCARBAMOL 500 MG/1
500 TABLET, FILM COATED ORAL 2 TIMES DAILY PRN
Qty: 10 TABLET | Refills: 0 | Status: SHIPPED | OUTPATIENT
Start: 2024-06-16

## 2024-06-16 RX ORDER — METHOCARBAMOL 500 MG/1
500 TABLET, FILM COATED ORAL ONCE
Status: COMPLETED | OUTPATIENT
Start: 2024-06-16 | End: 2024-06-16

## 2024-06-16 RX ORDER — ACETAMINOPHEN 325 MG/1
650 TABLET ORAL ONCE
Status: COMPLETED | OUTPATIENT
Start: 2024-06-16 | End: 2024-06-16

## 2024-06-16 RX ORDER — KETOROLAC TROMETHAMINE 30 MG/ML
15 INJECTION, SOLUTION INTRAMUSCULAR; INTRAVENOUS ONCE
Status: COMPLETED | OUTPATIENT
Start: 2024-06-16 | End: 2024-06-16

## 2024-06-16 RX ADMIN — ACETAMINOPHEN 650 MG: 325 TABLET ORAL at 16:42

## 2024-06-16 RX ADMIN — METHOCARBAMOL TABLETS 500 MG: 500 TABLET, COATED ORAL at 16:42

## 2024-06-16 RX ADMIN — KETOROLAC TROMETHAMINE 15 MG: 30 INJECTION, SOLUTION INTRAMUSCULAR at 16:42

## 2024-06-16 NOTE — DISCHARGE INSTRUCTIONS
As discussed your imaging did not reveal any acute abnormalities.  Please follow-up with orthopedics for further evaluation.  Return to the emergency room for any difficulty walking, numbness, tingling, or for any other new or concerning symptoms.    Your blood pressure was also found to be elevated. Please have this rechecked by your family doctor as soon as possible. Persistently elevated blood pressures can lead to complications such as stroke, heart attacks, and kidney failure.

## 2024-06-16 NOTE — ED NOTES
Patient ambulated with a cane out of the ED out of the ED, AAOx4 resp even and unlabored with no S$S of distress.       Zoraida Leal RN  06/16/24 4629

## 2024-06-17 ENCOUNTER — TELEPHONE (OUTPATIENT)
Age: 64
End: 2024-06-17

## 2024-06-17 NOTE — ED PROVIDER NOTES
"History  Chief Complaint   Patient presents with    Buttocks Pain     States she has a \" sit bone \" \" a bursitis \" \" where hamstring meets the pelvis \". States she was starting to bend over \" and heard a huge pop \" and pain both buttocks , states her right leg has \"become weaker and does not want to turn right \". Patient had left shoulder surgery on  and arm in sling.      Patient is a 63-year-old female, past medical history including hyperlipidemia, hypertension, and RA, who presents to the emergency room for right buttock/hamstring pain.  Patient states that she has had bilateral buttock/hamstring pain for a while.  Today while bending over she felt a pop followed by pain near her R buttock.  She has been able to walk but feels like it shakes when she walks.  Did not fall.  No numbness or tingling.  No trauma.  No other complaints or concerns.          Prior to Admission Medications   Prescriptions Last Dose Informant Patient Reported? Taking?   Azelastine HCl 137 MCG/SPRAY SOLN   No No   Si spray by Each Nare route 2 (two) times a day for 600 doses As directed   Patient not taking: Reported on 2024   Bioflavonoid Products (VITAMIN C PLUS PO)   Yes No   Sig: Take 1 capsule by mouth daily   Cholecalciferol (Vitamin D3) 75 MCG (3000 UT) TABS   Yes No   Sig: Take 1 capsule by mouth daily   Cyanocobalamin 100 MCG LOZG   Yes No   Sig: Take 100 mcg by mouth daily   Cyanocobalamin 5000 MCG/ML LIQD   Yes No   Sig: Place under the tongue   Omega-3 Fatty Acids (Fish Oil) 1200 MG CAPS   Yes No   Sig: Take by mouth   RESTASIS MULTIDOSE 0.05 % ophthalmic emulsion  Self Yes No   Sig: Administer to both eyes every 12 (twelve) hours     amLODIPine (NORVASC) 2.5 mg tablet   No No   Sig: Take 1 tablet (2.5 mg total) by mouth daily   amLODIPine (NORVASC) 5 mg tablet   No No   Sig: TAKE 1 TABLET (5 MG TOTAL) BY MOUTH DAILY.   atenolol (TENORMIN) 100 mg tablet   No No   Sig: TAKE 1 TABLET BY MOUTH EVERY DAY " Patient's mother left voice mail message, asked for call back. I returned call, left voice mail message, asked for call back.     "  dextromethorphan-guaifenesin (MUCINEX DM)  MG per 12 hr tablet   Yes No   Sig: Take 1 tablet by mouth daily   fluticasone (FLONASE) 50 mcg/act nasal spray   No No   Sig: SPRAY 2 SPRAYS INTO EACH NOSTRIL EVERY DAY   hydrochlorothiazide (HYDRODIURIL) 25 mg tablet   No No   Sig: Take 1 tablet (25 mg total) by mouth daily   hydroxychloroquine (PLAQUENIL) 200 mg tablet   No No   Sig: Take 1 tablet (200 mg total) by mouth 2 (two) times a day with meals   loratadine (Claritin) 10 mg tablet   Yes No   Sig: Take 10 mg by mouth daily   traMADol (ULTRAM) 50 mg tablet   No No   Sig: Take 1 tablet (50 mg total) by mouth 2 (two) times a day   triamcinolone (KENALOG) 0.5 % cream   Yes No   Sig: Apply 1 Application topically 2 (two) times a day To affected area      Facility-Administered Medications: None       Past Medical History:   Diagnosis Date    Allergic rhinitis     On amd off my whole life    Disease of thyroid gland     Years ago    Diverticulitis, colon     Ear problems     Started at age 2    Fatigue     GERD (gastroesophageal reflux disease) On and off    Headache(784.0)     HL (hearing loss)     Hypertension     Hypothyroidism     h/o \" borderline \"  issues    Lipoma of left shoulder 03/01/2024    Liver cyst     last assessed 4/18/16    Lyme disease     Obesity     Long time    Otitis media     On and off since i was 2    Positive Anaplasma serology     RA (rheumatoid arthritis) (HCC)     Seizure disorder in pregnancy (HCC)     had 1 seizure x1 1986 during pregnancy- none after    Seizures (HCC) 1986    Tonsillitis     As a child    Varicose vein of leg        Past Surgical History:   Procedure Laterality Date    BACK SURGERY      fusion/refusion of 2-8 vertebrae    COLECTOMY      partial sigmoid, last assessed 4/18/16    EPIDURAL BLOCK INJECTION Bilateral 1/31/2024    Procedure: BILATERAL L3-L4 TRANSFORAMINAL EPIDURAL STEROID INJECTION;  Surgeon: Americo Darling DO;  Location: [facility] MAIN OR;  Service: " Pain Management     JOINT REPLACEMENT      LAPAROSCOPY      with adnexectomy    LIVER SURGERY      AK SURGICAL ARTHROSCOPY LILLI W/CORACOACRM LIGM RLS Right 3/12/2020    Procedure: ARTHROSCOPY SHOULDER, BICEPS TENODESIS;  Surgeon: Teddy Keen MD;  Location: [facility] MAIN OR;  Service: Orthopedics    AK SURGICAL ARTHROSCOPY SHOULDER W/ROTATOR CUFF RPR Left 1/3/2019    Procedure: REPAIR ROTATOR CUFF  ARTHROSCOPIC, BICEPTS TENODESIS, SUBACROMIAL DECOMPRESION;  Surgeon: Teddy Keen MD;  Location: [facility] MAIN OR;  Service: Orthopedics    AK SURGICAL ARTHROSCOPY SHOULDER W/ROTATOR CUFF RPR Right 3/12/2020    Procedure: REPAIR ROTATOR CUFF  ARTHROSCOPIC WITH SUBACROMIAL DECOMPRESSION;  Surgeon: Teddy Keen MD;  Location: [facility] MAIN OR;  Service: Orthopedics    REPLACEMENT TOTAL KNEE Bilateral     SINUS SURGERY      TONSILLECTOMY      TYMPANOSTOMY TUBE PLACEMENT         Family History   Problem Relation Age of Onset    Hypertension Mother     Pancreatic cancer Mother 79    Cancer Mother         Pancreatic cancer    Arthritis Father     Hyperthyroidism Father     Hypertension Father     Rheum arthritis Father     No Known Problems Sister     No Known Problems Daughter     No Known Problems Daughter     No Known Problems Daughter     No Known Problems Maternal Grandmother     No Known Problems Maternal Grandfather     No Known Problems Paternal Grandmother     No Known Problems Paternal Grandfather     No Known Problems Brother     Rheum arthritis Brother     No Known Problems Maternal Aunt     No Known Problems Maternal Aunt     No Known Problems Paternal Aunt     No Known Problems Paternal Aunt     No Known Problems Paternal Uncle     ADD / ADHD Neg Hx     Anesthesia problems Neg Hx     Clotting disorder Neg Hx     Collagen disease Neg Hx     Diabetes Neg Hx     Dislocations Neg Hx     Learning disabilities Neg Hx     Neurological problems Neg Hx     Osteoporosis Neg Hx     Rheumatologic disease Neg Hx     Scoliosis  Neg Hx     Vascular Disease Neg Hx      I have reviewed and agree with the history as documented.    E-Cigarette/Vaping    E-Cigarette Use Never User      E-Cigarette/Vaping Substances    Nicotine No     THC No     CBD No      Social History     Tobacco Use    Smoking status: Never    Smokeless tobacco: Never   Vaping Use    Vaping status: Never Used   Substance Use Topics    Alcohol use: No    Drug use: No       Review of Systems   Musculoskeletal:  Negative for gait problem.        Buttock/hamstring pain     All other systems reviewed and are negative.      Physical Exam  Physical Exam  Vitals and nursing note reviewed.   Constitutional:       General: She is not in acute distress.     Appearance: She is well-developed. She is not ill-appearing, toxic-appearing or diaphoretic.   HENT:      Head: Normocephalic and atraumatic.      Right Ear: External ear normal.      Left Ear: External ear normal.      Nose: Nose normal.   Eyes:      General: Lids are normal. No scleral icterus.  Cardiovascular:      Rate and Rhythm: Normal rate and regular rhythm.      Heart sounds: Normal heart sounds. No murmur heard.     No friction rub. No gallop.   Pulmonary:      Effort: Pulmonary effort is normal. No respiratory distress.      Breath sounds: Normal breath sounds. No wheezing or rales.   Musculoskeletal:         General: No deformity. Normal range of motion.      Cervical back: Normal range of motion and neck supple.        Legs:       Comments: There is full range of motion of the right lower extremity.  No crepitus.  Right lower extremity compartments are soft.  Right lower extremity is neurovascularly intact.   Skin:     General: Skin is warm and dry.   Neurological:      General: No focal deficit present.      Mental Status: She is alert.   Psychiatric:         Mood and Affect: Mood normal.         Behavior: Behavior normal.         Vital Signs  ED Triage Vitals [06/16/24 1554]   Temperature Pulse Respirations Blood  Pressure SpO2   98.8 °F (37.1 °C) 68 20 (!) 193/98 96 %      Temp Source Heart Rate Source Patient Position - Orthostatic VS BP Location FiO2 (%)   Tympanic Monitor Sitting Right arm --      Pain Score       8           Vitals:    06/16/24 1554 06/16/24 1736   BP: (!) 193/98 (!) 178/98   Pulse: 68 66   Patient Position - Orthostatic VS: Sitting Lying         Visual Acuity      ED Medications  Medications   methocarbamol (ROBAXIN) tablet 500 mg (500 mg Oral Given 6/16/24 1642)   ketorolac (TORADOL) injection 15 mg (15 mg Intramuscular Given 6/16/24 1642)   acetaminophen (TYLENOL) tablet 650 mg (650 mg Oral Given 6/16/24 1642)       Diagnostic Studies  Results Reviewed       None                   XR hip/pelv 2-3 vws right if performed   ED Interpretation by Amrik Sandoval DO (06/16 1647)   No acute osseous abnormality                 Procedures  Procedures         ED Course                               SBIRT 20yo+      Flowsheet Row Most Recent Value   Initial Alcohol Screen: US AUDIT-C     1. How often do you have a drink containing alcohol? 0 Filed at: 06/16/2024 1557   Audit-C Score 0 Filed at: 06/16/2024 1557   FEDERICO: How many times in the past year have you...    Used an illegal drug or used a prescription medication for non-medical reasons? Never Filed at: 06/16/2024 1557                      Medical Decision Making  Patient is a 63 y.o. female who presents to the ED for right gluteal/hamstring pain.  Patient is nontoxic and well-appearing.  She is hypertensive otherwise vitals are stable.    Differential includes but is not limited to: Hamstring strain, bony injury.  Doubt fracture.  Presentation not consistent with dislocation.  Presentation not consistent with hamstring rupture.    X-rays obtained which did not show any acute osseous abnormality.  Patient was able to ambulate without difficulty.  Will discharge.  Discussed orthopedic follow-up.  Return precautions discussed.  Patient verbalized  "understanding and agreed to plan of care.               Portions of the record may have been created with voice recognition software. Occasional wrong word or \"sound a like\" substitutions may have occurred due to the inherent limitations of voice recognition software. Read the chart carefully and recognize, using context, where substitutions have occurred.    Problems Addressed:  Hypertension: chronic illness or injury  Pain of muscle of pelvis: acute illness or injury    Amount and/or Complexity of Data Reviewed  Radiology: ordered and independent interpretation performed.    Risk  OTC drugs.  Prescription drug management.             Disposition  Final diagnoses:   Pain of muscle of pelvis   Hypertension     Time reflects when diagnosis was documented in both MDM as applicable and the Disposition within this note       Time User Action Codes Description Comment    6/16/2024  4:48 PM Amrik Sandoval Add [M79.18] Pain of muscle of pelvis     6/16/2024  5:07 PM Amrik Sandoval Add [I10] Hypertension           ED Disposition       ED Disposition   Discharge    Condition   Stable    Date/Time   Sun Jun 16, 2024 1647    Comment   Caty Dang discharge to home/self care.                   Follow-up Information       Follow up With Specialties Details Why Contact Info Additional Information    [employer]  Call in 1 day  [phone]       [facility] Emergency Department Emergency Medicine   [address] [facility] [id number] [phone] [facility] Emergency Department, [address] [place] [address], [id number]    [facility] [place] [facility] Schedule an appointment as soon as possible for a visit   [address] [facility] [place] [address]  [facility] [address] [phone] [facility] [place], [address] [place] [address] [address], [address], [place], [address] [phone]      "       Discharge Medication List as of 6/16/2024  5:08 PM        START taking these medications    Details   naproxen (Naprosyn) 500 mg tablet Take 1 tablet (500 mg total) by mouth 2 (two) times a day with meals for 7 days, Starting Sun 6/16/2024, Until Sun 6/23/2024, Normal           CONTINUE these medications which have NOT CHANGED    Details   !! amLODIPine (NORVASC) 2.5 mg tablet Take 1 tablet (2.5 mg total) by mouth daily, Starting u 2/1/2024, Normal      !! amLODIPine (NORVASC) 5 mg tablet TAKE 1 TABLET (5 MG TOTAL) BY MOUTH DAILY., Starting Mon 1/8/2024, Normal      atenolol (TENORMIN) 100 mg tablet TAKE 1 TABLET BY MOUTH EVERY DAY, Starting Mon 4/22/2024, Normal      Azelastine HCl 137 MCG/SPRAY SOLN 1 spray by Each Nare route 2 (two) times a day for 600 doses As directed, Starting Mon 12/11/2023, Until Sun 10/6/2024, Normal      Bioflavonoid Products (VITAMIN C PLUS PO) Take 1 capsule by mouth daily, Historical Med      Cholecalciferol (Vitamin D3) 75 MCG (3000 UT) TABS Take 1 capsule by mouth daily, Historical Med      Cyanocobalamin 100 MCG LOZG Take 100 mcg by mouth daily, Historical Med      Cyanocobalamin 5000 MCG/ML LIQD Place under the tongue, Historical Med      dextromethorphan-guaifenesin (MUCINEX DM)  MG per 12 hr tablet Take 1 tablet by mouth daily, Historical Med      fluticasone (FLONASE) 50 mcg/act nasal spray SPRAY 2 SPRAYS INTO EACH NOSTRIL EVERY DAY, Normal      hydrochlorothiazide (HYDRODIURIL) 25 mg tablet Take 1 tablet (25 mg total) by mouth daily, Starting Wed 9/13/2023, Normal      hydroxychloroquine (PLAQUENIL) 200 mg tablet Take 1 tablet (200 mg total) by mouth 2 (two) times a day with meals, Starting Tue 4/23/2024, Until Mon 7/22/2024, Normal      loratadine (Claritin) 10 mg tablet Take 10 mg by mouth daily, Historical Med      Omega-3 Fatty Acids (Fish Oil) 1200 MG CAPS Take by mouth, Historical Med      RESTASIS MULTIDOSE 0.05 % ophthalmic emulsion Administer to both eyes  every 12 (twelve) hours  , Starting Tue 3/27/2018, Historical Med      traMADol (ULTRAM) 50 mg tablet Take 1 tablet (50 mg total) by mouth 2 (two) times a day, Starting Mon 6/10/2024, Until Wed 7/10/2024, Normal      triamcinolone (KENALOG) 0.5 % cream Apply 1 Application topically 2 (two) times a day To affected area, Starting Mon 3/25/2024, Historical Med       !! - Potential duplicate medications found. Please discuss with provider.          No discharge procedures on file.    PDMP Review         Value Time User    PDMP Reviewed  Yes 6/10/2024 12:37 PM Ayush Gerard PA-C            ED Provider  Electronically Signed by             Amrik Sandoval DO  06/16/24 5566

## 2024-06-17 NOTE — TELEPHONE ENCOUNTER
Contacted Patient regarding recent ED Visit dated 6/16/24.     Patient denies any fever at this time. Advised patient to contact the office with new or worsen symptoms as we have On Call Provider 24 hrs. Provided office hours and phone.  Patient does not wish to schedule a Follow up at this time, she will be following up with Ortho.        ED-Dez  CC: Buttocks Pain  DX; Pain of muscle of Pelvis; Hypertension  Time:3:35   Last OV:12/11/23

## 2024-06-18 ENCOUNTER — OFFICE VISIT (OUTPATIENT)
Dept: OBGYN CLINIC | Facility: CLINIC | Age: 64
End: 2024-06-18
Payer: MEDICARE

## 2024-06-18 VITALS
SYSTOLIC BLOOD PRESSURE: 180 MMHG | DIASTOLIC BLOOD PRESSURE: 83 MMHG | HEART RATE: 67 BPM | HEIGHT: 67 IN | BODY MASS INDEX: 38.3 KG/M2 | WEIGHT: 244 LBS

## 2024-06-18 DIAGNOSIS — S76.311A HAMSTRING STRAIN, RIGHT, INITIAL ENCOUNTER: ICD-10-CM

## 2024-06-18 DIAGNOSIS — S76.302A HAMSTRING INJURY, LEFT, INITIAL ENCOUNTER: Primary | ICD-10-CM

## 2024-06-18 PROCEDURE — 99214 OFFICE O/P EST MOD 30 MIN: CPT | Performed by: ORTHOPAEDIC SURGERY

## 2024-06-18 NOTE — PROGRESS NOTES
Assessment/Plan:  1. Hamstring injury, left, initial encounter  MRI pelvis wo contrast      2. Hamstring strain, right, initial encounter            Janett has bilateral hip and pelvis pain which appears to be along the insertion of her hamstring tendons bilaterally.  She likely had hamstring tendinitis or partial injury of her hamstring until recently she bent forward and now felt a pop and has increased pain along the right side at the hamstring insertion.  I am concerned for possible hamstring tendon rupture.  I have ordered an MRI of her placed pelvis which will adequately evaluate the hamstring tendon on both sides.  She will follow-up after MRI is complete.    Subjective:   Caty Dang is a 63 y.o. female who presents to the office for evaluation for bilateral hamstring pain.  She states she had a slip a few months ago where she irritated her hamstring and had pain along both ischial tuberosity regions.  She was trying to avoid straining these areas and had been feeling pain on both sides until 1 week ago she felt a pop over the posterior aspect of her right hip and hamstring insertion when bending forward.  She had immediate pain and discomfort and has had trouble sitting ever since.  She feels some weakness in the right lower extremity.  She denies any history of hamstring injury in the past.  She does have a history of lumbar radiculopathy and prior spinal fusion.  She does not feel the current pain is coming from her back.  She denies any numbness down her legs currently.    Review of Systems   Constitutional:  Negative for chills, fever and unexpected weight change.   HENT:  Negative for hearing loss, nosebleeds and sore throat.    Eyes:  Negative for pain, redness and visual disturbance.   Respiratory:  Negative for cough, shortness of breath and wheezing.    Cardiovascular:  Negative for chest pain, palpitations and leg swelling.   Gastrointestinal:  Negative for abdominal pain, nausea and vomiting.  "  Endocrine: Negative for polydipsia and polyuria.   Genitourinary:  Negative for dysuria and hematuria.   Musculoskeletal:         See HPI   Skin:  Negative for rash and wound.   Neurological:  Negative for dizziness, numbness and headaches.   Psychiatric/Behavioral:  Negative for decreased concentration and suicidal ideas. The patient is not nervous/anxious.          Past Medical History:   Diagnosis Date    Allergic rhinitis     On amd off my whole life    Disease of thyroid gland     Years ago    Diverticulitis, colon     Ear problems     Started at age 2    Fatigue     GERD (gastroesophageal reflux disease) On and off    Headache(784.0)     HL (hearing loss)     Hypertension     Hypothyroidism     h/o \" borderline \"  issues    Lipoma of left shoulder 03/01/2024    Liver cyst     last assessed 4/18/16    Lyme disease     Obesity     Long time    Otitis media     On and off since i was 2    Positive Anaplasma serology     RA (rheumatoid arthritis) (HCC)     Seizure disorder in pregnancy (HCC)     had 1 seizure x1 1986 during pregnancy- none after    Seizures (HCC) 1986    Tonsillitis     As a child    Varicose vein of leg        Past Surgical History:   Procedure Laterality Date    BACK SURGERY      fusion/refusion of 2-8 vertebrae    COLECTOMY      partial sigmoid, last assessed 4/18/16    EPIDURAL BLOCK INJECTION Bilateral 1/31/2024    Procedure: BILATERAL L3-L4 TRANSFORAMINAL EPIDURAL STEROID INJECTION;  Surgeon: Americo Darling DO;  Location: [facility] MAIN OR;  Service: Pain Management     JOINT REPLACEMENT      LAPAROSCOPY      with adnexectomy    LIVER SURGERY      [facility] SURGICAL ARTHROSCOPY LILLI W/CORACOACRM LIGM RLS Right 3/12/2020    Procedure: ARTHROSCOPY SHOULDER, BICEPS TENODESIS;  Surgeon: Teddy Keen MD;  Location: [facility] MA[facility];  Service: Orthopedics    [facility] SURGICAL ARTHROSCOPY SHOULDER W/ROTATOR CUFF RPR Left 1/3/2019    Procedure: REPAIR ROTATOR CUFF  ARTHROSCOPIC, BICEPTS TENODESIS, SUBACROMIAL " DECOMPRESION;  Surgeon: Teddy Keen MD;  Location: [facility];  Service: Orthopedics    AL SURGICAL ARTHROSCOPY SHOULDER W/ROTATOR CUFF RPR Right 3/12/2020    Procedure: REPAIR ROTATOR CUFF  ARTHROSCOPIC WITH SUBACROMIAL DECOMPRESSION;  Surgeon: Teddy Keen MD;  Location: [facility];  Service: Orthopedics    REPLACEMENT TOTAL KNEE Bilateral     SINUS SURGERY      TONSILLECTOMY      TYMPANOSTOMY TUBE PLACEMENT         Family History   Problem Relation Age of Onset    Hypertension Mother     Pancreatic cancer Mother 79    Cancer Mother         Pancreatic cancer    Arthritis Father     Hyperthyroidism Father     Hypertension Father     Rheum arthritis Father     No Known Problems Sister     No Known Problems Daughter     No Known Problems Daughter     No Known Problems Daughter     No Known Problems Maternal Grandmother     No Known Problems Maternal Grandfather     No Known Problems Paternal Grandmother     No Known Problems Paternal Grandfather     No Known Problems Brother     Rheum arthritis Brother     No Known Problems Maternal Aunt     No Known Problems Maternal Aunt     No Known Problems Paternal Aunt     No Known Problems Paternal Aunt     No Known Problems Paternal Uncle     ADD / ADHD Neg Hx     Anesthesia problems Neg Hx     Clotting disorder Neg Hx     Collagen disease Neg Hx     Diabetes Neg Hx     Dislocations Neg Hx     Learning disabilities Neg Hx     Neurological problems Neg Hx     Osteoporosis Neg Hx     Rheumatologic disease Neg Hx     Scoliosis Neg Hx     Vascular Disease Neg Hx        Social History     Occupational History    Not on file   Tobacco Use    Smoking status: Never    Smokeless tobacco: Never   Vaping Use    Vaping status: Never Used   Substance and Sexual Activity    Alcohol use: No    Drug use: No    Sexual activity: Yes     Partners: Male     Birth control/protection: Female Sterilization         Current Outpatient Medications:     amLODIPine (NORVASC) 2.5 mg tablet,  Take 1 tablet (2.5 mg total) by mouth daily, Disp: 90 tablet, Rfl: 2    amLODIPine (NORVASC) 5 mg tablet, TAKE 1 TABLET (5 MG TOTAL) BY MOUTH DAILY., Disp: 90 tablet, Rfl: 1    atenolol (TENORMIN) 100 mg tablet, TAKE 1 TABLET BY MOUTH EVERY DAY, Disp: 90 tablet, Rfl: 0    Azelastine HCl 137 MCG/SPRAY SOLN, 1 spray by Each Nare route 2 (two) times a day for 600 doses As directed (Patient not taking: Reported on 4/23/2024), Disp: 30 mL, Rfl: 2    Bioflavonoid Products (VITAMIN C PLUS PO), Take 1 capsule by mouth daily, Disp: , Rfl:     Cholecalciferol (Vitamin D3) 75 MCG (3000 UT) TABS, Take 1 capsule by mouth daily, Disp: , Rfl:     Cyanocobalamin 100 MCG LOZG, Take 100 mcg by mouth daily, Disp: , Rfl:     Cyanocobalamin 5000 MCG/ML LIQD, Place under the tongue, Disp: , Rfl:     dextromethorphan-guaifenesin (MUCINEX DM)  MG per 12 hr tablet, Take 1 tablet by mouth daily, Disp: , Rfl:     fluticasone (FLONASE) 50 mcg/act nasal spray, SPRAY 2 SPRAYS INTO EACH NOSTRIL EVERY DAY, Disp: 48 mL, Rfl: 1    hydrochlorothiazide (HYDRODIURIL) 25 mg tablet, Take 1 tablet (25 mg total) by mouth daily, Disp: 90 tablet, Rfl: 6    hydroxychloroquine (PLAQUENIL) 200 mg tablet, Take 1 tablet (200 mg total) by mouth 2 (two) times a day with meals, Disp: 90 tablet, Rfl: 3    loratadine (Claritin) 10 mg tablet, Take 10 mg by mouth daily, Disp: , Rfl:     methocarbamol (ROBAXIN) 500 mg tablet, Take 1 tablet (500 mg total) by mouth 2 (two) times a day as needed for muscle spasms, Disp: 10 tablet, Rfl: 0    naproxen (Naprosyn) 500 mg tablet, Take 1 tablet (500 mg total) by mouth 2 (two) times a day with meals for 7 days, Disp: 14 tablet, Rfl: 0    Omega-3 Fatty Acids (Fish Oil) 1200 MG CAPS, Take by mouth, Disp: , Rfl:     RESTASIS MULTIDOSE 0.05 % ophthalmic emulsion, Administer to both eyes every 12 (twelve) hours  , Disp: , Rfl:     traMADol (ULTRAM) 50 mg tablet, Take 1 tablet (50 mg total) by mouth 2 (two) times a day, Disp: 60  tablet, Rfl: 2    triamcinolone (KENALOG) 0.5 % cream, Apply 1 Application topically 2 (two) times a day To affected area, Disp: , Rfl:     Allergies   Allergen Reactions    Bee Pollen Other (See Comments)     headaches    headaches   headaches    Pollen Extract Other (See Comments)     headaches       Objective:  Vitals:    06/18/24 1359   BP: (!) 180/83   Pulse: 67     Pain Score:   7      Right Hip Exam     Tenderness   The patient is experiencing tenderness in the ischial tuberosity.    Range of Motion   External rotation:  normal   Internal rotation:  normal     Other   Erythema: absent  Sensation: normal  Pulse: present      Left Hip Exam     Tenderness   The patient is experiencing tenderness in the ischial tuberosity.    Range of Motion   External rotation:  normal   Internal rotation: normal     Other   Erythema: absent  Sensation: normal  Pulse: present            Physical Exam  Vitals and nursing note reviewed.   Constitutional:       Appearance: Normal appearance. She is well-developed.   HENT:      Head: Normocephalic and atraumatic.      Right Ear: External ear normal.      Left Ear: External ear normal.   Eyes:      General: No scleral icterus.     Extraocular Movements: Extraocular movements intact.      Conjunctiva/sclera: Conjunctivae normal.   Cardiovascular:      Rate and Rhythm: Normal rate.   Pulmonary:      Effort: Pulmonary effort is normal. No respiratory distress.   Musculoskeletal:      Cervical back: Normal range of motion and neck supple.      Comments: See Ortho exam   Skin:     General: Skin is warm and dry.   Neurological:      General: No focal deficit present.      Mental Status: She is alert and oriented to person, place, and time.   Psychiatric:         Behavior: Behavior normal.         I have personally reviewed pertinent films in PACS and my interpretation is as follows:  X-ray of the left pelvis demonstrates no evidence of acute abnormality.      This document was created  using speech voice recognition software.   Grammatical errors, random word insertions, pronoun errors, and incomplete sentences are an occasional consequence of this system due to software limitations, ambient noise, and hardware issues.   Any formal questions or concerns about content, text, or information contained within the body of this dictation should be directly addressed to the provider for clarification.

## 2024-06-24 ENCOUNTER — HOSPITAL ENCOUNTER (OUTPATIENT)
Dept: RADIOLOGY | Facility: HOSPITAL | Age: 64
Discharge: HOME | End: 2024-06-24
Attending: ORTHOPAEDIC SURGERY
Payer: MEDICARE

## 2024-06-24 DIAGNOSIS — S76.302A HAMSTRING INJURY, LEFT, INITIAL ENCOUNTER: ICD-10-CM

## 2024-06-24 PROCEDURE — 72195 MRI PELVIS W/O DYE: CPT

## 2024-07-01 ENCOUNTER — TELEPHONE (OUTPATIENT)
Dept: PAIN MEDICINE | Facility: CLINIC | Age: 64
End: 2024-07-01

## 2024-07-01 DIAGNOSIS — M76.899 HAMSTRING TENDINITIS AT ORIGIN: Primary | ICD-10-CM

## 2024-07-01 NOTE — TELEPHONE ENCOUNTER
Lvm to    ----- Message from Jaycob Fisher MD sent at 7/1/2024 10:50 AM EDT -----  I reviewed the MRI. There are some abnormalities of her hamstring on the MRI, but I do not believe she will require surgery. I would recommend PT to help strengthen the area and improve pain. If she would like to come in and discuss prior to starting I am happy to see her. If she would prefer to start PT first that would be fine as well.  ----- Message -----  From: Yudi Thomas  Sent: 7/1/2024   9:05 AM EDT  To: Jaycob Fisher MD    Please review MRI and advise of results, Dr. Eli is OOO until 8/5. Patient is awaiting call with results and next treatment options  ----- Message -----  From: Interface, Radiology Results In  Sent: 6/30/2024  12:55 PM EDT  To: Hernan Eli, DO

## 2024-07-01 NOTE — TELEPHONE ENCOUNTER
Lalo pt and advised of below, she would like to start with PT first, will put script in and fax [facility]

## 2024-07-14 DIAGNOSIS — I10 ESSENTIAL HYPERTENSION: ICD-10-CM

## 2024-07-15 ENCOUNTER — TELEPHONE (OUTPATIENT)
Age: 64
End: 2024-07-15

## 2024-07-15 RX ORDER — ATENOLOL 100 MG/1
100 TABLET ORAL DAILY
Qty: 90 TABLET | Refills: 0 | OUTPATIENT
Start: 2024-07-15

## 2024-07-15 NOTE — TELEPHONE ENCOUNTER
[facility]- please remove Dr Taylor as PCP. Confirmed with patient they have established care with a new PCP outside of the [facility] network.

## 2024-07-22 NOTE — TELEPHONE ENCOUNTER
07/22/24 2:25 PM        The office's request has been received, reviewed, and the patient chart updated. The PCP has successfully been removed with a patient attribution note. This message will now be completed.        Thank you  Kamila Jerome

## 2024-07-25 ENCOUNTER — HOSPITAL ENCOUNTER (OUTPATIENT)
Dept: CT IMAGING | Facility: HOSPITAL | Age: 64
End: 2024-07-25
Payer: MEDICARE

## 2024-07-25 DIAGNOSIS — R10.13 EPIGASTRIC PAIN: ICD-10-CM

## 2024-07-25 PROCEDURE — 74160 CT ABDOMEN W/CONTRAST: CPT

## 2024-07-25 RX ADMIN — IOHEXOL 75 ML: 350 INJECTION, SOLUTION INTRAVENOUS at 13:59

## 2024-08-06 ENCOUNTER — OFFICE VISIT (OUTPATIENT)
Dept: URGENT CARE | Facility: CLINIC | Age: 64
End: 2024-08-06
Payer: MEDICARE

## 2024-08-06 VITALS
DIASTOLIC BLOOD PRESSURE: 80 MMHG | HEART RATE: 67 BPM | RESPIRATION RATE: 18 BRPM | TEMPERATURE: 97.2 F | HEIGHT: 67 IN | WEIGHT: 239 LBS | SYSTOLIC BLOOD PRESSURE: 152 MMHG | OXYGEN SATURATION: 97 % | BODY MASS INDEX: 37.51 KG/M2

## 2024-08-06 DIAGNOSIS — H92.01 RIGHT EAR PAIN: Primary | ICD-10-CM

## 2024-08-06 PROCEDURE — 99213 OFFICE O/P EST LOW 20 MIN: CPT | Performed by: PHYSICIAN ASSISTANT

## 2024-08-06 RX ORDER — TRAMADOL HYDROCHLORIDE 50 MG/1
50 TABLET ORAL 2 TIMES DAILY
COMMUNITY
Start: 2024-07-17

## 2024-08-06 NOTE — PROGRESS NOTES
[facility]        NAME: Caty Dang is a 64 y.o. female  : 1960    MRN: [id number]  DATE: 2024  TIME: 9:47 AM    Assessment and Plan   Right ear pain [H92.01]  1. Right ear pain          Patient Instructions   Right ear pain  No signs of fluid or infection  Recommend continuing Flonase nasal spray and daily Claritin  Follow-up with ENT if persists    Follow up with PCP in 3-5 days.  Proceed to  ER if symptoms worsen.    If tests have been performed at [facility], our office will contact you with results if changes need to be made to the care plan discussed with you at the visit.  You can review your full results on [facility].    Chief Complaint     Chief Complaint   Patient presents with    Earache     C/O of RT ear pain on and off for around 6 weeks and it's not getting better. Yesterday the RT ear pain got worst. And at time feeling dizzy. Yesterday used Dexamethasone ophthalmic drops and Ofloxacin otic drops (old prescription)         History of Present Illness       Janett is a 64-year-old female who presents to clinic complaining of right ear pain x 6 weeks.  She states that the pain is dull and has been on and off however over the last 3 days it has been constant.  She also notes wetness on and off coming from the ear but denies any bleeding or discharge.  She denies any fever, chills, tinnitus, nasal congestion, rhinorrhea, sore throat, or cough.  She states she is had similar issues and has seen an ENT in the past and was given eyedrops.  She has been using those drops with no relief.        Review of Systems   Review of Systems   Constitutional:  Negative for chills and fever.   HENT:  Positive for ear pain. Negative for congestion, ear discharge, hearing loss, rhinorrhea, sore throat and tinnitus.    Respiratory:  Negative for cough.    Neurological:  Negative for dizziness and headaches.         Current Medications       Current Outpatient Medications:      amLODIPine (NORVASC) 2.5 mg tablet, Take 1 tablet (2.5 mg total) by mouth daily, Disp: 90 tablet, Rfl: 2    amLODIPine (NORVASC) 5 mg tablet, TAKE 1 TABLET (5 MG TOTAL) BY MOUTH DAILY., Disp: 90 tablet, Rfl: 1    atenolol (TENORMIN) 100 mg tablet, TAKE 1 TABLET BY MOUTH EVERY DAY, Disp: 90 tablet, Rfl: 0    Azelastine HCl 137 MCG/SPRAY SOLN, 1 spray by Each Nare route 2 (two) times a day for 600 doses As directed, Disp: 30 mL, Rfl: 2    Bioflavonoid Products (VITAMIN C PLUS PO), Take 1 capsule by mouth daily, Disp: , Rfl:     Cholecalciferol (Vitamin D3) 75 MCG (3000 UT) TABS, Take 1 capsule by mouth daily, Disp: , Rfl:     Cyanocobalamin 100 MCG LOZG, Take 100 mcg by mouth daily, Disp: , Rfl:     Cyanocobalamin 5000 MCG/ML LIQD, Place under the tongue, Disp: , Rfl:     fluticasone (FLONASE) 50 mcg/act nasal spray, SPRAY 2 SPRAYS INTO EACH NOSTRIL EVERY DAY, Disp: 48 mL, Rfl: 1    hydrochlorothiazide (HYDRODIURIL) 25 mg tablet, Take 1 tablet (25 mg total) by mouth daily, Disp: 90 tablet, Rfl: 6    loratadine (Claritin) 10 mg tablet, Take 10 mg by mouth daily, Disp: , Rfl:     Omega-3 Fatty Acids (Fish Oil) 1200 MG CAPS, Take by mouth, Disp: , Rfl:     traMADol (ULTRAM) 50 mg tablet, Take 50 mg by mouth 2 (two) times a day, Disp: , Rfl:     triamcinolone (KENALOG) 0.5 % cream, Apply 1 Application topically 2 (two) times a day To affected area, Disp: , Rfl:     dextromethorphan-guaifenesin (MUCINEX DM)  MG per 12 hr tablet, Take 1 tablet by mouth daily (Patient not taking: Reported on 8/6/2024), Disp: , Rfl:     hydroxychloroquine (PLAQUENIL) 200 mg tablet, Take 1 tablet (200 mg total) by mouth 2 (two) times a day with meals, Disp: 90 tablet, Rfl: 3    methocarbamol (ROBAXIN) 500 mg tablet, Take 1 tablet (500 mg total) by mouth 2 (two) times a day as needed for muscle spasms, Disp: 10 tablet, Rfl: 0    naproxen (Naprosyn) 500 mg tablet, Take 1 tablet (500 mg total) by mouth 2 (two) times a day with meals for 7  "days, Disp: 14 tablet, Rfl: 0    RESTASIS MULTIDOSE 0.05 % ophthalmic emulsion, Administer to both eyes every 12 (twelve) hours  , Disp: , Rfl:     Current Allergies     Allergies as of 08/06/2024 - Reviewed 08/06/2024   Allergen Reaction Noted    Bee pollen Other (See Comments) 03/29/2023    Pollen extract Other (See Comments)             The following portions of the patient's history were reviewed and updated as appropriate: allergies, current medications, past family history, past medical history, past social history, past surgical history and problem list.     Past Medical History:   Diagnosis Date    Allergic rhinitis     On amd off my whole life    Disease of thyroid gland     Years ago    Diverticulitis, colon     Ear problems     Started at age 2    Fatigue     GERD (gastroesophageal reflux disease) On and off    Headache(784.0)     HL (hearing loss)     Hypertension     Hypothyroidism     h/o \" borderline \"  issues    Lipoma of left shoulder 03/01/2024    Liver cyst     last assessed 4/18/16    Lyme disease     Obesity     Long time    Otitis media     On and off since i was 2    Positive Anaplasma serology     RA (rheumatoid arthritis) (HCC)     Seizure disorder in pregnancy (HCC)     had 1 seizure x1 1986 during pregnancy- none after    Seizures (HCC) 1986    Tonsillitis     As a child    Varicose vein of leg        Past Surgical History:   Procedure Laterality Date    BACK SURGERY      fusion/refusion of 2-8 vertebrae    COLECTOMY      partial sigmoid, last assessed 4/18/16    EPIDURAL BLOCK INJECTION Bilateral 1/31/2024    Procedure: BILATERAL L3-L4 TRANSFORAMINAL EPIDURAL STEROID INJECTION;  Surgeon: Americo Darling DO;  Location: [facility] MAIN OR;  Service: Pain Management     JOINT REPLACEMENT      LAPAROSCOPY      with adnexectomy    LIVER SURGERY      [facility] SUR[facility] ARTHROSCOPY LILLI W/CORACOACRM LIGM RLS Right 3/12/2020    Procedure: ARTHROSCOPY SHOULDER, BICEPS TENODESIS;  Surgeon: Teddy" "MD Leonila;  Location: [facility];  Service: Orthopedics    [facility] SURGICAL ARTHROSCOPY SHOULDER W/ROTATOR CUFF RPR Left 1/3/2019    Procedure: REPAIR ROTATOR CUFF  ARTHROSCOPIC, BICEPTS TENODESIS, SUBACROMIAL DECOMPRESION;  Surgeon: Teddy Keen MD;  Location: [facility];  Service: Orthopedics    [facility] SURGICAL ARTHROSCOPY SHOULDER W/ROTATOR CUFF RPR Right 3/12/2020    Procedure: REPAIR ROTATOR CUFF  ARTHROSCOPIC WITH SUBACROMIAL DECOMPRESSION;  Surgeon: Teddy Keen MD;  Location: [facility];  Service: Orthopedics    REPLACEMENT TOTAL KNEE Bilateral     SINUS SURGERY      TONSILLECTOMY      TYMPANOSTOMY TUBE PLACEMENT         Family History   Problem Relation Age of Onset    Hypertension Mother     Pancreatic cancer Mother 79    Cancer Mother         Pancreatic cancer    Arthritis Father     Hyperthyroidism Father     Hypertension Father     Rheum arthritis Father     No Known Problems Sister     No Known Problems Daughter     No Known Problems Daughter     No Known Problems Daughter     No Known Problems Maternal Grandmother     No Known Problems Maternal Grandfather     No Known Problems Paternal Grandmother     No Known Problems Paternal Grandfather     No Known Problems Brother     Rheum arthritis Brother     No Known Problems Maternal Aunt     No Known Problems Maternal Aunt     No Known Problems Paternal Aunt     No Known Problems Paternal Aunt     No Known Problems Paternal Uncle     ADD / ADHD Neg Hx     Anesthesia problems Neg Hx     Clotting disorder Neg Hx     Collagen disease Neg Hx     Diabetes Neg Hx     Dislocations Neg Hx     Learning disabilities Neg Hx     Neurological problems Neg Hx     Osteoporosis Neg Hx     Rheumatologic disease Neg Hx     Scoliosis Neg Hx     Vascular Disease Neg Hx          Medications have been verified.        Objective   /80   Pulse 67   Temp (!) 97.2 °F (36.2 °C)   Resp 18   Ht 5' 7\" (1.702 m)   Wt 108 kg (239 lb)   SpO2 97%   BMI 37.43 kg/m²   No " LMP recorded. Patient is postmenopausal.       Physical Exam     Physical Exam  Vitals and nursing note reviewed.   Constitutional:       General: She is not in acute distress.     Appearance: Normal appearance. She is not ill-appearing.   HENT:      Right Ear: Tympanic membrane, ear canal and external ear normal. There is no impacted cerumen.      Left Ear: Tympanic membrane, ear canal and external ear normal. There is no impacted cerumen.      Nose: Nose normal.      Mouth/Throat:      Mouth: Mucous membranes are moist.      Pharynx: No oropharyngeal exudate or posterior oropharyngeal erythema.   Cardiovascular:      Rate and Rhythm: Normal rate and regular rhythm.      Heart sounds: Normal heart sounds.   Pulmonary:      Effort: Pulmonary effort is normal.      Breath sounds: Normal breath sounds.   Lymphadenopathy:      Cervical: No cervical adenopathy.   Neurological:      Mental Status: She is alert and oriented to person, place, and time.   Psychiatric:         Mood and Affect: Mood normal.         Behavior: Behavior normal.

## 2024-08-14 ENCOUNTER — TELEPHONE (OUTPATIENT)
Dept: RHEUMATOLOGY | Facility: CLINIC | Age: 64
End: 2024-08-14

## 2024-09-13 DIAGNOSIS — M15.9 OSTEOARTHRITIS OF MULTIPLE JOINTS, UNSPECIFIED OSTEOARTHRITIS TYPE: Primary | ICD-10-CM

## 2024-09-13 RX ORDER — TRAMADOL HYDROCHLORIDE 50 MG/1
50 TABLET ORAL EVERY 12 HOURS PRN
Qty: 60 TABLET | Refills: 0 | Status: SHIPPED | OUTPATIENT
Start: 2024-09-13

## 2024-09-13 NOTE — TELEPHONE ENCOUNTER
Patient is requesting a refill on her traMADol medication. She would like this sent to [facility] on [address] in [place], [place]. Please advise.    [facility] #- [phone]

## 2024-10-09 NOTE — PROGRESS NOTES
Ambulatory Visit  Name: Caty Dang      : 1960      MRN: [id number]  Encounter Provider: Caro Sanchez DO  Encounter Date: 10/10/2024   Encounter department: [facility] RHEUMATOLOG[facility] [address]    Assessment & Plan  Seronegative rheumatoid arthritis (HCC)  Patient is a 64-year-old female presenting for follow-up of seronegative RA.  Patient is on Plaquenil 200 mg twice daily and doing well.  No joint swelling.  No synovitis on exam today.  Patient's joint pain seems to be most consistent with osteoarthritic pain.  -Continue hydroxychloroquine 200 mg twice daily  -Continue routine retinal screenings while on Plaquenil       Osteoarthritis of multiple joints, unspecified osteoarthritis type  Patient noted to have significant osteoarthritic changes on imaging and exam.  Patient takes tramadol and NSAIDs.  NSAIDs have been upsetting her stomach.  Discussed alternative measures for pain control.  -Continue tramadol 50 mg every 12 hours as needed  -Discussed scheduled Tylenol, no more than 3 g a day  -Discussed aqua therapy  -Recommended paraffin wax baths       Encounter for osteoporosis screening in asymptomatic postmenopausal patient  Will obtain screening DEXA scan a postmenopausal female  Orders:    DXA bone density spine hip and pelvis; Future    Sicca syndrome (HCC)  Patient reports history of dry eyes and dry mouth.  Will obtain serologies to evaluate further for Sjogren's syndrome.  Orders:    Sjogren's Antibodies; Future      History of Present Illness     Caty Dang is a 64 y.o. female with a history of lumbar radiculopathy, bilateral rotator cuff tears, DJD, L4-L5 fusion, OA status post bilateral TKA, history of diverticulosis, fatty liver disease, multicystic liver disease and hypertension who presents for follow-up of seronegative erosive rheumatoid arthritis.    History obtained from : patient    Patient reports her joint symptoms have been stable.  Reports she has good days and bad  days.  Patient reports stiffness in the morning which is predominantly in her back.  Denies any joint swelling.  Joint pain tends to be worse with use.  Patient reports that she has stopped taking NSAIDs because it is upsetting her stomach.      Reports dry eyes and dry mouth.  Reports she is off of Restasis for now.    Follows with ophthalmologist regularly.    Patient's daughter was diagnosed with Sjogren's syndrome.    Rheumatic Disease Summary:  Seronegative erosive RA  Patient was first diagnosed with seronegative rheumatoid arthritis by Dr. Carrera around 2017.  Patient was first seen here on 1/19/2024.  Patient was following with Ayush Gerard PA-C.  Patient has a history of erosive arthritis of the MCPs of the left hand and degenerative changes based on x-ray.  Patient had no inflammatory changes on exam during office visit.  Patient had previously been on hydroxychloroquine, meloxicam and tramadol.  Patient has been continued on hydroxychloroquine 200 mg twice daily along with NSAIDs.    Patient has a history of dry eyes for which she uses Restasis drops.    OA  -s/p bilateral TKA  -Tramadol 50 twice daily (Continued from previous rheumatologist)      Review of Systems   Constitutional:  Negative for activity change, chills and fever.   HENT:  Negative for mouth sores.    Eyes:  Negative for pain.   Respiratory:  Negative for cough and shortness of breath.    Cardiovascular:  Negative for chest pain and leg swelling.   Gastrointestinal:  Negative for abdominal pain.   Musculoskeletal:  Positive for arthralgias and back pain. Negative for joint swelling.   Skin:  Negative for color change and rash.       Past Medical History:   Diagnosis Date    Allergic rhinitis     On amd off my whole life    Disease of thyroid gland     Years ago    Diverticulitis, colon     Ear problems     Started at age 2    Fatigue     GERD (gastroesophageal reflux disease) On and off    Headache(784.0)     HL (hearing loss)      "Hypertension     Hypothyroidism     h/o \" borderline \"  issues    Lipoma of left shoulder 03/01/2024    Liver cyst     last assessed 4/18/16    Lyme disease     Obesity     Long time    Otitis media     On and off since i was 2    Positive Anaplasma serology     RA (rheumatoid arthritis) (HCC)     Seizure disorder in pregnancy (HCC)     had 1 seizure x1 1986 during pregnancy- none after    Seizures (HCC) 1986    Tonsillitis     As a child    Varicose vein of leg        Current Outpatient Medications on File Prior to Visit   Medication Sig Dispense Refill    amLODIPine (NORVASC) 2.5 mg tablet Take 1 tablet (2.5 mg total) by mouth daily 90 tablet 2    amLODIPine (NORVASC) 5 mg tablet TAKE 1 TABLET (5 MG TOTAL) BY MOUTH DAILY. 90 tablet 1    atenolol (TENORMIN) 100 mg tablet TAKE 1 TABLET BY MOUTH EVERY DAY 90 tablet 0    Bioflavonoid Products (VITAMIN C PLUS PO) Take 1 capsule by mouth daily      Cholecalciferol (Vitamin D3) 75 MCG (3000 UT) TABS Take 1 capsule by mouth daily      Cyanocobalamin 100 MCG LOZG Take 100 mcg by mouth daily      Cyanocobalamin 5000 MCG/ML LIQD Place under the tongue      dextromethorphan-guaifenesin (MUCINEX DM)  MG per 12 hr tablet Take 1 tablet by mouth daily      fluticasone (FLONASE) 50 mcg/act nasal spray SPRAY 2 SPRAYS INTO EACH NOSTRIL EVERY DAY 48 mL 1    hydrochlorothiazide (HYDRODIURIL) 25 mg tablet Take 1 tablet (25 mg total) by mouth daily 90 tablet 6    loratadine (Claritin) 10 mg tablet Take 10 mg by mouth daily      Omega-3 Fatty Acids (Fish Oil) 1200 MG CAPS Take by mouth      oxyCODONE-acetaminophen (PERCOCET) 5-325 mg per tablet       traMADol (ULTRAM) 50 mg tablet Take 1 tablet (50 mg total) by mouth every 12 (twelve) hours as needed for severe pain 60 tablet 0    triamcinolone (KENALOG) 0.5 % cream Apply 1 Application topically 2 (two) times a day To affected area      Azelastine HCl 137 MCG/SPRAY SOLN 1 spray by Each Nare route 2 (two) times a day for 600 doses " "As directed 30 mL 2    hydroxychloroquine (PLAQUENIL) 200 mg tablet Take 1 tablet (200 mg total) by mouth 2 (two) times a day with meals 90 tablet 3    methocarbamol (ROBAXIN) 500 mg tablet Take 1 tablet (500 mg total) by mouth 2 (two) times a day as needed for muscle spasms 10 tablet 0    naproxen (Naprosyn) 500 mg tablet Take 1 tablet (500 mg total) by mouth 2 (two) times a day with meals for 7 days 14 tablet 0    RESTASIS MULTIDOSE 0.05 % ophthalmic emulsion Administer to both eyes every 12 (twelve) hours         No current facility-administered medications on file prior to visit.      Social History     Tobacco Use    Smoking status: Never    Smokeless tobacco: Never   Vaping Use    Vaping status: Never Used   Substance and Sexual Activity    Alcohol use: No    Drug use: No    Sexual activity: Yes     Partners: Male     Birth control/protection: Female Sterilization         Objective     /70 (BP Location: Right arm, Patient Position: Sitting, Cuff Size: Large)   Pulse 58   Temp 98.2 °F (36.8 °C) (Tympanic)   Ht 5' 7\" (1.702 m)   Wt 110 kg (242 lb)   SpO2 99%   BMI 37.90 kg/m²     Physical Exam  General appearance: normal appearing, no acute distress  Skin: normal, no rashes  HEENT: normal, moist oropharynx, no nasal or oral ulcers  Lymph nodes: no palpable adenopathy  Lungs: normal respiratory effort, comfortable on room air, lungs clear to auscultation b/l   Heart: normal heart sounds, normal rate, normal rhythm,  Abdomen: soft, normal bowel sounds, no tenderness  Neurologic: no obvious neurological deficits   Extremities: no edema, warm and well perfused     Musculoskeletal Exam:   - Observation: no obvious joint abnormalities    - Palpation: no joint tenderness  - Synovitis: absent  - Joint effusions: absent  - ROM: intact throughout  - Muscle Strength: 5/5 throughout             I have independently reviewed the following labs/images and interpret them as follows.   01/19/24 10:44   MILAGROS SCREEN " Negative   CYCLIC CITRULLINATED PEPTIDE ANTIBODY 1.9   RHEUMATOID FACTOR Negative   C-REACTIVE PROTEIN <1.0       Caro Sanchez DO, CCD, [facility]

## 2024-10-10 ENCOUNTER — OFFICE VISIT (OUTPATIENT)
Age: 64
End: 2024-10-10

## 2024-10-10 VITALS
HEART RATE: 58 BPM | HEIGHT: 67 IN | BODY MASS INDEX: 37.98 KG/M2 | OXYGEN SATURATION: 99 % | WEIGHT: 242 LBS | SYSTOLIC BLOOD PRESSURE: 142 MMHG | DIASTOLIC BLOOD PRESSURE: 70 MMHG | TEMPERATURE: 98.2 F

## 2024-10-10 DIAGNOSIS — M15.9 OSTEOARTHRITIS OF MULTIPLE JOINTS, UNSPECIFIED OSTEOARTHRITIS TYPE: ICD-10-CM

## 2024-10-10 DIAGNOSIS — Z78.0 ENCOUNTER FOR OSTEOPOROSIS SCREENING IN ASYMPTOMATIC POSTMENOPAUSAL PATIENT: ICD-10-CM

## 2024-10-10 DIAGNOSIS — M35.00 SICCA SYNDROME (HCC): ICD-10-CM

## 2024-10-10 DIAGNOSIS — M06.00 SERONEGATIVE RHEUMATOID ARTHRITIS (HCC): Primary | ICD-10-CM

## 2024-10-10 DIAGNOSIS — Z13.820 ENCOUNTER FOR OSTEOPOROSIS SCREENING IN ASYMPTOMATIC POSTMENOPAUSAL PATIENT: ICD-10-CM

## 2024-10-10 RX ORDER — OXYCODONE AND ACETAMINOPHEN 5; 325 MG/1; MG/1
TABLET ORAL
COMMUNITY
Start: 2024-10-09

## 2024-10-21 DIAGNOSIS — M15.9 OSTEOARTHRITIS OF MULTIPLE JOINTS, UNSPECIFIED OSTEOARTHRITIS TYPE: ICD-10-CM

## 2024-10-21 RX ORDER — TRAMADOL HYDROCHLORIDE 50 MG/1
50 TABLET ORAL EVERY 12 HOURS PRN
Qty: 60 TABLET | Refills: 0 | Status: SHIPPED | OUTPATIENT
Start: 2024-10-21

## 2024-10-23 ENCOUNTER — TELEPHONE (OUTPATIENT)
Dept: RHEUMATOLOGY | Facility: CLINIC | Age: 64
End: 2024-10-23

## 2024-10-23 NOTE — TELEPHONE ENCOUNTER
- LM on 10/22 and 10/23, a Mychart message was also sent on 10/23 letting pt know that Dr. Sanchez will not be in the office on 2/11/25. Pts appt was cancelled and a letter was sent.

## 2024-11-27 DIAGNOSIS — M15.9 OSTEOARTHRITIS OF MULTIPLE JOINTS, UNSPECIFIED OSTEOARTHRITIS TYPE: ICD-10-CM

## 2024-11-27 RX ORDER — TRAMADOL HYDROCHLORIDE 50 MG/1
50 TABLET ORAL EVERY 12 HOURS PRN
Qty: 60 TABLET | Refills: 0 | Status: SHIPPED | OUTPATIENT
Start: 2024-11-27

## 2024-11-27 NOTE — TELEPHONE ENCOUNTER
1 [id number] 10/21/2024 10/21/2024 traMADol HCL (Tablet) 60.0 30 50 MG 20.0 [facility] Medicare 0 / 0 PA   1 [id number] 09/15/2024 09/13/2024 traMADol HCL (Tablet) 60.0 30 50 MG 20.0 [facility] Medicare 0 / 0 PA   1 [id number] 08/16/2024 06/10/2024 traMADol HCL (Tablet) 60.0 30 50 MG 20.0 [facility] Medicare 2 / 2 PA

## 2024-12-07 DIAGNOSIS — I10 ESSENTIAL HYPERTENSION: ICD-10-CM

## 2024-12-09 RX ORDER — HYDROCHLOROTHIAZIDE 25 MG/1
25 TABLET ORAL DAILY
Qty: 90 TABLET | Refills: 6 | Status: SHIPPED | OUTPATIENT
Start: 2024-12-09

## 2024-12-10 ENCOUNTER — HOSPITAL ENCOUNTER (OUTPATIENT)
Dept: RADIOLOGY | Facility: HOSPITAL | Age: 64
Discharge: HOME | End: 2024-12-10
Payer: MEDICARE

## 2024-12-10 VITALS — HEIGHT: 67 IN | WEIGHT: 242 LBS | BODY MASS INDEX: 37.98 KG/M2

## 2024-12-10 DIAGNOSIS — Z78.0 ENCOUNTER FOR OSTEOPOROSIS SCREENING IN ASYMPTOMATIC POSTMENOPAUSAL PATIENT: ICD-10-CM

## 2024-12-10 DIAGNOSIS — Z13.820 ENCOUNTER FOR OSTEOPOROSIS SCREENING IN ASYMPTOMATIC POSTMENOPAUSAL PATIENT: ICD-10-CM

## 2024-12-10 PROCEDURE — 77080 DXA BONE DENSITY AXIAL: CPT

## 2024-12-12 ENCOUNTER — RESULTS FOLLOW-UP (OUTPATIENT)
Age: 64
End: 2024-12-12

## 2024-12-20 ENCOUNTER — OFFICE VISIT (OUTPATIENT)
Dept: GASTROENTEROLOGY | Facility: AMBULARY SURGERY CENTER | Age: 64
End: 2024-12-20
Payer: MEDICARE

## 2024-12-20 VITALS
WEIGHT: 236.2 LBS | DIASTOLIC BLOOD PRESSURE: 78 MMHG | HEART RATE: 80 BPM | SYSTOLIC BLOOD PRESSURE: 144 MMHG | HEIGHT: 67 IN | OXYGEN SATURATION: 97 % | BODY MASS INDEX: 37.07 KG/M2

## 2024-12-20 DIAGNOSIS — K21.9 GASTROESOPHAGEAL REFLUX DISEASE, UNSPECIFIED WHETHER ESOPHAGITIS PRESENT: Primary | ICD-10-CM

## 2024-12-20 DIAGNOSIS — Z87.19 HISTORY OF DIVERTICULITIS: ICD-10-CM

## 2024-12-20 DIAGNOSIS — R68.81 EARLY SATIETY: ICD-10-CM

## 2024-12-20 DIAGNOSIS — Z86.0100 HISTORY OF COLON POLYPS: ICD-10-CM

## 2024-12-20 DIAGNOSIS — Z87.19 HISTORY OF DIVERTICULOSIS: ICD-10-CM

## 2024-12-20 DIAGNOSIS — R10.13 EPIGASTRIC PAIN: ICD-10-CM

## 2024-12-20 PROCEDURE — 99204 OFFICE O/P NEW MOD 45 MIN: CPT | Performed by: PHYSICIAN ASSISTANT

## 2024-12-20 RX ORDER — SODIUM CHLORIDE, SODIUM LACTATE, POTASSIUM CHLORIDE, CALCIUM CHLORIDE 600; 310; 30; 20 MG/100ML; MG/100ML; MG/100ML; MG/100ML
125 INJECTION, SOLUTION INTRAVENOUS CONTINUOUS
OUTPATIENT
Start: 2024-12-20

## 2024-12-20 NOTE — PROGRESS NOTES
Name: Caty Dang      : 1960      MRN: [id number]  Encounter Provider: Haresh Quesada PA-C  Encounter Date: 2024   Encounter department: [facility] [place]  :  Assessment & Plan  Gastroesophageal reflux disease, unspecified whether esophagitis present  -Continue Prevacid but recommended to take daily.    - Plan EGD  - I have reviewed the risks of endoscopy which include but are not limited to; anesthesia complications, injury/perforation of the bowel, infection and bleeding.  Also recommend:  - avoid NSAIDS  - avoid eating within 3 hours of sleeping  - elevated head of bed 4-6 inches while sleeping  - avoid trigger foods  - recommend daily supply of calcium and vitamin D    Orders:  •  EGD; Future    Epigastric pain  - plan EGD  Orders:  •  EGD; Future    Early satiety  -If EGD unrevealing would consider gastric emptying study  Orders:  •  EGD; Future    History of colon polyps  -Last colonoscopy 8 to 9 years ago with polyps removed.  -Overdue for next surveillance colonoscopy  - I have reviewed the risks of endoscopy which include but are not limited to; anesthesia complications, injury/perforation of the bowel, infection and bleeding.      Orders:  •  Colonoscopy; Future    History of diverticulosis  -Good daily fluids and fiber  -Avoid constipation       History of diverticulitis    Orders:  •  Colonoscopy; Future        History of Present Illness   HPI  Caty Dang is a 64 y.o. female new to our office being seen in consult with past medical history of hypertension, GERD, hyperlipidemia, hypothyroidism, diverticulosis with diverticulitis who presents with concerns for GERD, decreased appetite and sense of early satiety.  She also reports history of diverticulitis around 11 years ago.  Her last EGD and colonoscopy was 8 to 9 years ago in [facility].  She does report likely polyps removed at that time.  She does report significant  decreased appetite.    She utilizes Prevacid more on an as-needed basis for her heartburn.  She has had several months of this epigastric pain.  She had subsequent mammogram and CT.  CT done in July 2024 showed cystic lesions in the liver.  She has known of her cysts.  She denies any change in bowels black or bloody stools.  She denies any significant NSAID use.  She denies any pain or difficulty swallowing.  No nausea or vomiting.    No pacemaker or defibrillator implanted.   No chronic kidney disease or solitary kidney.  Not on any supplemental oxygen at night.  Not on any antiplatelet or anticoagulants.  BMI is 36.99    Endoscopic History  EGD -8 to 9 years ago done for GERD, unrevealing per patient  Colonoscopy -8 to 9 years ago with possible polyps removed per patient      History obtained from: patient    Review of Systems   Constitutional:  Negative for activity change, appetite change, chills, diaphoresis, fatigue and unexpected weight change.   HENT:  Negative for mouth sores, sore throat and trouble swallowing.    Eyes:  Negative for pain, redness and visual disturbance.   Respiratory:  Negative for apnea, chest tightness and shortness of breath.    Cardiovascular:  Negative for chest pain and leg swelling.   Gastrointestinal:  Positive for abdominal distention and abdominal pain (Epigastric). Negative for anal bleeding, blood in stool, constipation, diarrhea, nausea and vomiting.        GERD, early satiety   Genitourinary:  Negative for difficulty urinating, dysuria and hematuria.   Musculoskeletal:  Negative for arthralgias, back pain, gait problem, joint swelling and myalgias.   Skin:  Negative for color change, pallor and rash.   Allergic/Immunologic: Negative for environmental allergies and food allergies.   Neurological:  Negative for dizziness, weakness, light-headedness, numbness and headaches.   Psychiatric/Behavioral:  Negative for agitation and behavioral problems.      Past Medical History  "  Past Medical History:   Diagnosis Date   • Allergic rhinitis     On amd off my whole life   • Disease of thyroid gland     Years ago   • Diverticulitis, colon    • Ear problems     Started at age 2   • Fatigue    • GERD (gastroesophageal reflux disease) On and off   • GERD (gastroesophageal reflux disease) 12/20/2024   • Headache(784.0)    • HL (hearing loss)    • Hypertension    • Hypothyroidism     h/o \" borderline \"  issues   • Lipoma of left shoulder 03/01/2024   • Liver cyst     last assessed 4/18/16   • Lyme disease    • Obesity     Long time   • Otitis media     On and off since i was 2   • Positive Anaplasma serology    • RA (rheumatoid arthritis) ([facility])    • Seizure disorder in pregnancy (HCC)     had 1 seizure x1 1986 during pregnancy- none after   • Seizures (HCC) 1986   • Tonsillitis     As a child   • Varicose vein of leg      Past Surgical History:   Procedure Laterality Date   • BACK SURGERY      fusion/refusion of 2-8 vertebrae   • COLECTOMY      partial sigmoid, last assessed 4/18/16   • EPIDURAL BLOCK INJECTION Bilateral 01/31/2024    Procedure: BILATERAL L3-L4 TRANSFORAMINAL EPIDURAL STEROID INJECTION;  Surgeon: Americo Darling DO;  Location: [facility] MAIN OR;  Service: Pain Management    • JOINT REPLACEMENT     • LAPAROSCOPY      with adnexectomy   • LIVER SURGERY     • IA SURGICAL ARTHROSCOPY LILLI W/CORACOACRM LIGM RLS Right 03/12/2020    Procedure: ARTHROSCOPY SHOULDER, BICEPS TENODESIS;  Surgeon: Teddy Keen MD;  Location: [facility] MAIN OR;  Service: Orthopedics   • IA SURGICAL ARTHROSCOPY SHOULDER W/ROTATOR CUFF RPR Left 01/03/2019    Procedure: REPAIR ROTATOR CUFF  ARTHROSCOPIC, BICEPTS TENODESIS, SUBACROMIAL DECOMPRESION;  Surgeon: Teddy Keen MD;  Location: [facility] MAIN OR;  Service: Orthopedics   • IA SURGICAL ARTHROSCOPY SHOULDER W/ROTATOR CUFF RPR Right 03/12/2020    Procedure: REPAIR ROTATOR CUFF  ARTHROSCOPIC WITH SUBACROMIAL DECOMPRESSION;  Surgeon: Teddy Keen MD;  " Location: [facility] MAIN OR;  Service: Orthopedics   • REPLACEMENT TOTAL KNEE Bilateral    • SHOULDER SURGERY     • SINUS SURGERY     • TONSILLECTOMY     • TYMPANOSTOMY TUBE PLACEMENT       Family History   Problem Relation Age of Onset   • Hypertension Mother    • Pancreatic cancer Mother 79   • Cancer Mother         Pancreatic cancer   • Arthritis Father    • Hyperthyroidism Father    • Hypertension Father    • Rheum arthritis Father    • No Known Problems Sister    • No Known Problems Daughter    • No Known Problems Daughter    • No Known Problems Daughter    • No Known Problems Maternal Grandmother    • No Known Problems Maternal Grandfather    • No Known Problems Paternal Grandmother    • No Known Problems Paternal Grandfather    • Drug abuse Brother    • Rheum arthritis Brother    • Mental illness Maternal Aunt         Schizophrenic   • No Known Problems Maternal Aunt    • No Known Problems Paternal Aunt    • No Known Problems Paternal Aunt    • No Known Problems Paternal Uncle    • Cancer Son         Chordoma on his spine   • ADD / ADHD Neg Hx    • Anesthesia problems Neg Hx    • Clotting disorder Neg Hx    • Collagen disease Neg Hx    • Diabetes Neg Hx    • Dislocations Neg Hx    • Learning disabilities Neg Hx    • Neurological problems Neg Hx    • Osteoporosis Neg Hx    • Rheumatologic disease Neg Hx    • Scoliosis Neg Hx    • Vascular Disease Neg Hx       reports that she has never smoked. She has never used smokeless tobacco. She reports that she does not drink alcohol and does not use drugs.  Current Outpatient Medications on File Prior to Visit   Medication Sig Dispense Refill   • amLODIPine (NORVASC) 2.5 mg tablet Take 1 tablet (2.5 mg total) by mouth daily 90 tablet 2   • amLODIPine (NORVASC) 5 mg tablet TAKE 1 TABLET (5 MG TOTAL) BY MOUTH DAILY. 90 tablet 1   • atenolol (TENORMIN) 100 mg tablet TAKE 1 TABLET BY MOUTH EVERY DAY 90 tablet 0   • Bioflavonoid Products (VITAMIN C PLUS PO) Take 1 capsule by mouth  daily     • Cholecalciferol (Vitamin D3) 75 MCG (3000 UT) TABS Take 1 capsule by mouth daily     • Cyanocobalamin 100 MCG LOZG Take 100 mcg by mouth daily     • Cyanocobalamin 5000 MCG/ML LIQD Place under the tongue     • dextromethorphan-guaifenesin (MUCINEX DM)  MG per 12 hr tablet Take 1 tablet by mouth daily     • fluticasone (FLONASE) 50 mcg/act nasal spray SPRAY 2 SPRAYS INTO EACH NOSTRIL EVERY DAY 48 mL 1   • hydroCHLOROthiazide 25 mg tablet TAKE 1 TABLET (25 MG TOTAL) BY MOUTH DAILY. 90 tablet 6   • loratadine (Claritin) 10 mg tablet Take 10 mg by mouth daily     • Omega-3 Fatty Acids (Fish Oil) 1200 MG CAPS Take by mouth     • traMADol (ULTRAM) 50 mg tablet TAKE 1 TABLET (50 MG TOTAL) BY MOUTH EVERY 12 (TWELVE) HOURS AS NEEDED FOR SEVERE PAIN 60 tablet 0   • triamcinolone (KENALOG) 0.5 % cream Apply 1 Application topically 2 (two) times a day To affected area     • Azelastine HCl 137 MCG/SPRAY SOLN 1 spray by Each Nare route 2 (two) times a day for 600 doses As directed 30 mL 2   • hydroxychloroquine (PLAQUENIL) 200 mg tablet Take 1 tablet (200 mg total) by mouth 2 (two) times a day with meals 90 tablet 3   • methocarbamol (ROBAXIN) 500 mg tablet Take 1 tablet (500 mg total) by mouth 2 (two) times a day as needed for muscle spasms 10 tablet 0   • naproxen (Naprosyn) 500 mg tablet Take 1 tablet (500 mg total) by mouth 2 (two) times a day with meals for 7 days 14 tablet 0   • oxyCODONE-acetaminophen (PERCOCET) 5-325 mg per tablet      • RESTASIS MULTIDOSE 0.05 % ophthalmic emulsion Administer to both eyes every 12 (twelve) hours         No current facility-administered medications on file prior to visit.     Allergies   Allergen Reactions   • Bee Pollen Other (See Comments)     headaches    headaches   headaches   • Pollen Extract Other (See Comments)     headaches      Current Outpatient Medications on File Prior to Visit   Medication Sig Dispense Refill   • amLODIPine (NORVASC) 2.5 mg tablet Take 1  tablet (2.5 mg total) by mouth daily 90 tablet 2   • amLODIPine (NORVASC) 5 mg tablet TAKE 1 TABLET (5 MG TOTAL) BY MOUTH DAILY. 90 tablet 1   • atenolol (TENORMIN) 100 mg tablet TAKE 1 TABLET BY MOUTH EVERY DAY 90 tablet 0   • Bioflavonoid Products (VITAMIN C PLUS PO) Take 1 capsule by mouth daily     • Cholecalciferol (Vitamin D3) 75 MCG (3000 UT) TABS Take 1 capsule by mouth daily     • Cyanocobalamin 100 MCG LOZG Take 100 mcg by mouth daily     • Cyanocobalamin 5000 MCG/ML LIQD Place under the tongue     • dextromethorphan-guaifenesin (MUCINEX DM)  MG per 12 hr tablet Take 1 tablet by mouth daily     • fluticasone (FLONASE) 50 mcg/act nasal spray SPRAY 2 SPRAYS INTO EACH NOSTRIL EVERY DAY 48 mL 1   • hydroCHLOROthiazide 25 mg tablet TAKE 1 TABLET (25 MG TOTAL) BY MOUTH DAILY. 90 tablet 6   • loratadine (Claritin) 10 mg tablet Take 10 mg by mouth daily     • Omega-3 Fatty Acids (Fish Oil) 1200 MG CAPS Take by mouth     • traMADol (ULTRAM) 50 mg tablet TAKE 1 TABLET (50 MG TOTAL) BY MOUTH EVERY 12 (TWELVE) HOURS AS NEEDED FOR SEVERE PAIN 60 tablet 0   • triamcinolone (KENALOG) 0.5 % cream Apply 1 Application topically 2 (two) times a day To affected area     • Azelastine HCl 137 MCG/SPRAY SOLN 1 spray by Each Nare route 2 (two) times a day for 600 doses As directed 30 mL 2   • hydroxychloroquine (PLAQUENIL) 200 mg tablet Take 1 tablet (200 mg total) by mouth 2 (two) times a day with meals 90 tablet 3   • methocarbamol (ROBAXIN) 500 mg tablet Take 1 tablet (500 mg total) by mouth 2 (two) times a day as needed for muscle spasms 10 tablet 0   • naproxen (Naprosyn) 500 mg tablet Take 1 tablet (500 mg total) by mouth 2 (two) times a day with meals for 7 days 14 tablet 0   • oxyCODONE-acetaminophen (PERCOCET) 5-325 mg per tablet      • RESTASIS MULTIDOSE 0.05 % ophthalmic emulsion Administer to both eyes every 12 (twelve) hours         No current facility-administered medications on file prior to visit.     "  Social History     Tobacco Use   • Smoking status: Never   • Smokeless tobacco: Never   Vaping Use   • Vaping status: Never Used   Substance and Sexual Activity   • Alcohol use: No   • Drug use: No   • Sexual activity: Yes     Partners: Male     Birth control/protection: Female Sterilization        Objective   /78 (BP Location: Left arm, Patient Position: Sitting, Cuff Size: Standard)   Pulse 80   Ht 5' 7\" (1.702 m)   Wt 107 kg (236 lb 3.2 oz)   SpO2 97%   BMI 36.99 kg/m²      Physical Exam  Vitals reviewed.   Constitutional:       General: She is not in acute distress.     Appearance: Normal appearance. She is not ill-appearing.   HENT:      Head: Normocephalic and atraumatic.   Eyes:      General: No scleral icterus.     Conjunctiva/sclera: Conjunctivae normal.   Cardiovascular:      Rate and Rhythm: Normal rate and regular rhythm.   Pulmonary:      Effort: Pulmonary effort is normal. No respiratory distress.      Breath sounds: Normal breath sounds.   Abdominal:      General: Bowel sounds are normal. There is no distension.      Palpations: Abdomen is soft.      Tenderness: There is no abdominal tenderness. There is no guarding.   Skin:     General: Skin is warm and dry.   Neurological:      Mental Status: She is alert and oriented to person, place, and time.   Psychiatric:         Mood and Affect: Mood normal.         Behavior: Behavior normal.             Haresh Quesada PA-C  [facility]    "

## 2024-12-20 NOTE — LETTER
2024     Carlton Cordoba DC  [address] [place] [address]    Patient: Caty Dang   YOB: 1960   Date of Visit: 2024       Dear Dr. Cordoba:    Thank you for referring Caty Dang to me for evaluation. Below are my notes for this consultation.    If you have questions, please do not hesitate to call me. I look forward to following your patient along with you.         Sincerely,        Haresh Quesada PA-C        CC: No Recipients    Haresh Quesada PA-C  2024 10:08 AM  Sign when Signing Visit  Name: Caty Dang      : 1960      MRN: [id number]  Encounter Provider: Haresh Quesada PA-C  Encounter Date: 2024   Encounter department: [facility]  :  Assessment & Plan  Gastroesophageal reflux disease, unspecified whether esophagitis present  - plan EGD  - I have reviewed the risks of endoscopy which include but are not limited to; anesthesia complications, injury/perforation of the bowel, infection and bleeding.  Also recommend:  - avoid NSAIDS  - avoid eating within 3 hours of sleeping  - elevated head of bed 4-6 inches while sleeping  - avoid trigger foods  - recommend daily supply of calcium and vitamin D    Orders:  •  EGD; Future    Epigastric pain  - plan EGD  Orders:  •  EGD; Future    Early satiety  -If EGD unrevealing would consider gastric emptying study  Orders:  •  EGD; Future    History of diverticulosis  -Good daily fluids and fiber  -Avoid constipation       History of diverticulitis  -Patient should have colonoscopy to rule out any underlying neoplasm  - I have reviewed the risks of endoscopy which include but are not limited to; anesthesia complications, injury/perforation of the bowel, infection and bleeding.    Orders:  •  Colonoscopy; Future    History of colon polyps    Orders:  •  Colonoscopy; Future        History of Present Illness  HPI  Caty Dang  is a 64 y.o. female new to our office being seen in consult with past medical history of hypertension, GERD, hyperlipidemia, hypothyroidism, diverticulosis with diverticulitis who presents with concerns for GERD, decreased appetite and sense of early satiety.  She also reports history of diverticulitis a couple years ago with no colonoscopy afterwards.  Her last colonoscopy was 8 to 9 years ago in [facility].    No pacemaker or defibrillator implanted.   No chronic kidney disease or solitary kidney.  Not on any supplemental oxygen at night.  Not on any antiplatelet or anticoagulants.  BMI is       History obtained from: patient    Review of Systems   Constitutional:  Negative for activity change, appetite change, chills, diaphoresis, fatigue and unexpected weight change.   HENT:  Negative for mouth sores, sore throat and trouble swallowing.    Eyes:  Negative for pain, redness and visual disturbance.   Respiratory:  Negative for apnea, chest tightness and shortness of breath.    Cardiovascular:  Negative for chest pain and leg swelling.   Gastrointestinal:  Positive for abdominal distention and abdominal pain (Epigastric). Negative for anal bleeding, blood in stool, constipation, diarrhea, nausea and vomiting.        GERD, early satiety   Genitourinary:  Negative for difficulty urinating, dysuria and hematuria.   Musculoskeletal:  Negative for arthralgias, back pain, gait problem, joint swelling and myalgias.   Skin:  Negative for color change, pallor and rash.   Allergic/Immunologic: Negative for environmental allergies and food allergies.   Neurological:  Negative for dizziness, weakness, light-headedness, numbness and headaches.   Psychiatric/Behavioral:  Negative for agitation and behavioral problems.      Past Medical History  Past Medical History:   Diagnosis Date   • Allergic rhinitis     On amd off my whole life   • Disease of thyroid gland     Years ago   • Diverticulitis, colon    • Ear problems   "   Started at age 2   • Fatigue    • GERD (gastroesophageal reflux disease) On and off   • GERD (gastroesophageal reflux disease) 12/20/2024   • Headache(784.0)    • HL (hearing loss)    • Hypertension    • Hypothyroidism     h/o \" borderline \"  issues   • Lipoma of left shoulder 03/01/2024   • Liver cyst     last assessed 4/18/16   • Lyme disease    • Obesity     Long time   • Otitis media     On and off since i was 2   • Positive Anaplasma serology    • RA (rheumatoid arthritis) (HCC)    • Seizure disorder in pregnancy (HCC)     had 1 seizure x1 1986 during pregnancy- none after   • Seizures (HCC) 1986   • Tonsillitis     As a child   • Varicose vein of leg      Past Surgical History:   Procedure Laterality Date   • BACK SURGERY      fusion/refusion of 2-8 vertebrae   • COLECTOMY      partial sigmoid, last assessed 4/18/16   • EPIDURAL BLOCK INJECTION Bilateral 01/31/2024    Procedure: BILATERAL L3-L4 TRANSFORAMINAL EPIDURAL STEROID INJECTION;  Surgeon: Americo Darling DO;  Location: [facility] MAIN OR;  Service: Pain Management    • JOINT REPLACEMENT     • LAPAROSCOPY      with adnexectomy   • LIVER SURGERY     • RI SURGICAL ARTHROSCOPY LILLI W/CORACOACRM LIGM RLS Right 03/12/2020    Procedure: ARTHROSCOPY SHOULDER, BICEPS TENODESIS;  Surgeon: Teddy Keen MD;  Location: [facility] MAIN OR;  Service: Orthopedics   • RI SURGICAL ARTHROSCOPY SHOULDER W/ROTATOR CUFF RPR Left 01/03/2019    Procedure: REPAIR ROTATOR CUFF  ARTHROSCOPIC, BICEPTS TENODESIS, SUBACROMIAL DECOMPRESION;  Surgeon: Teddy Keen MD;  Location: [facility] MAIN OR;  Service: Orthopedics   • RI SURGICAL ARTHROSCOPY SHOULDER W/ROTATOR CUFF RPR Right 03/12/2020    Procedure: REPAIR ROTATOR CUFF  ARTHROSCOPIC WITH SUBACROMIAL DECOMPRESSION;  Surgeon: Teddy Keen MD;  Location: [facility] MAIN OR;  Service: Orthopedics   • REPLACEMENT TOTAL KNEE Bilateral    • SHOULDER SURGERY     • SINUS SURGERY     • TONSILLECTOMY     • TYMPANOSTOMY TUBE PLACEMENT   "     Family History   Problem Relation Age of Onset   • Hypertension Mother    • Pancreatic cancer Mother 79   • Cancer Mother         Pancreatic cancer   • Arthritis Father    • Hyperthyroidism Father    • Hypertension Father    • Rheum arthritis Father    • No Known Problems Sister    • No Known Problems Daughter    • No Known Problems Daughter    • No Known Problems Daughter    • No Known Problems Maternal Grandmother    • No Known Problems Maternal Grandfather    • No Known Problems Paternal Grandmother    • No Known Problems Paternal Grandfather    • Drug abuse Brother    • Rheum arthritis Brother    • Mental illness Maternal Aunt         Schizophrenic   • No Known Problems Maternal Aunt    • No Known Problems Paternal Aunt    • No Known Problems Paternal Aunt    • No Known Problems Paternal Uncle    • Cancer Son         Chordoma on his spine   • ADD / ADHD Neg Hx    • Anesthesia problems Neg Hx    • Clotting disorder Neg Hx    • Collagen disease Neg Hx    • Diabetes Neg Hx    • Dislocations Neg Hx    • Learning disabilities Neg Hx    • Neurological problems Neg Hx    • Osteoporosis Neg Hx    • Rheumatologic disease Neg Hx    • Scoliosis Neg Hx    • Vascular Disease Neg Hx       reports that she has never smoked. She has never used smokeless tobacco. She reports that she does not drink alcohol and does not use drugs.  Current Outpatient Medications on File Prior to Visit   Medication Sig Dispense Refill   • amLODIPine (NORVASC) 2.5 mg tablet Take 1 tablet (2.5 mg total) by mouth daily 90 tablet 2   • amLODIPine (NORVASC) 5 mg tablet TAKE 1 TABLET (5 MG TOTAL) BY MOUTH DAILY. 90 tablet 1   • atenolol (TENORMIN) 100 mg tablet TAKE 1 TABLET BY MOUTH EVERY DAY 90 tablet 0   • Bioflavonoid Products (VITAMIN C PLUS PO) Take 1 capsule by mouth daily     • Cholecalciferol (Vitamin D3) 75 MCG (3000 UT) TABS Take 1 capsule by mouth daily     • Cyanocobalamin 100 MCG LOZG Take 100 mcg by mouth daily     • Cyanocobalamin  5000 MCG/ML LIQD Place under the tongue     • dextromethorphan-guaifenesin (MUCINEX DM)  MG per 12 hr tablet Take 1 tablet by mouth daily     • fluticasone (FLONASE) 50 mcg/act nasal spray SPRAY 2 SPRAYS INTO EACH NOSTRIL EVERY DAY 48 mL 1   • hydroCHLOROthiazide 25 mg tablet TAKE 1 TABLET (25 MG TOTAL) BY MOUTH DAILY. 90 tablet 6   • loratadine (Claritin) 10 mg tablet Take 10 mg by mouth daily     • Omega-3 Fatty Acids (Fish Oil) 1200 MG CAPS Take by mouth     • traMADol (ULTRAM) 50 mg tablet TAKE 1 TABLET (50 MG TOTAL) BY MOUTH EVERY 12 (TWELVE) HOURS AS NEEDED FOR SEVERE PAIN 60 tablet 0   • triamcinolone (KENALOG) 0.5 % cream Apply 1 Application topically 2 (two) times a day To affected area     • Azelastine HCl 137 MCG/SPRAY SOLN 1 spray by Each Nare route 2 (two) times a day for 600 doses As directed 30 mL 2   • hydroxychloroquine (PLAQUENIL) 200 mg tablet Take 1 tablet (200 mg total) by mouth 2 (two) times a day with meals 90 tablet 3   • methocarbamol (ROBAXIN) 500 mg tablet Take 1 tablet (500 mg total) by mouth 2 (two) times a day as needed for muscle spasms 10 tablet 0   • naproxen (Naprosyn) 500 mg tablet Take 1 tablet (500 mg total) by mouth 2 (two) times a day with meals for 7 days 14 tablet 0   • oxyCODONE-acetaminophen (PERCOCET) 5-325 mg per tablet      • RESTASIS MULTIDOSE 0.05 % ophthalmic emulsion Administer to both eyes every 12 (twelve) hours         No current facility-administered medications on file prior to visit.     Allergies   Allergen Reactions   • Bee Pollen Other (See Comments)     headaches    headaches   headaches   • Pollen Extract Other (See Comments)     headaches     Current Outpatient Medications on File Prior to Visit   Medication Sig Dispense Refill   • amLODIPine (NORVASC) 2.5 mg tablet Take 1 tablet (2.5 mg total) by mouth daily 90 tablet 2   • amLODIPine (NORVASC) 5 mg tablet TAKE 1 TABLET (5 MG TOTAL) BY MOUTH DAILY. 90 tablet 1   • atenolol (TENORMIN) 100 mg tablet  TAKE 1 TABLET BY MOUTH EVERY DAY 90 tablet 0   • Bioflavonoid Products (VITAMIN C PLUS PO) Take 1 capsule by mouth daily     • Cholecalciferol (Vitamin D3) 75 MCG (3000 UT) TABS Take 1 capsule by mouth daily     • Cyanocobalamin 100 MCG LOZG Take 100 mcg by mouth daily     • Cyanocobalamin 5000 MCG/ML LIQD Place under the tongue     • dextromethorphan-guaifenesin (MUCINEX DM)  MG per 12 hr tablet Take 1 tablet by mouth daily     • fluticasone (FLONASE) 50 mcg/act nasal spray SPRAY 2 SPRAYS INTO EACH NOSTRIL EVERY DAY 48 mL 1   • hydroCHLOROthiazide 25 mg tablet TAKE 1 TABLET (25 MG TOTAL) BY MOUTH DAILY. 90 tablet 6   • loratadine (Claritin) 10 mg tablet Take 10 mg by mouth daily     • Omega-3 Fatty Acids (Fish Oil) 1200 MG CAPS Take by mouth     • traMADol (ULTRAM) 50 mg tablet TAKE 1 TABLET (50 MG TOTAL) BY MOUTH EVERY 12 (TWELVE) HOURS AS NEEDED FOR SEVERE PAIN 60 tablet 0   • triamcinolone (KENALOG) 0.5 % cream Apply 1 Application topically 2 (two) times a day To affected area     • Azelastine HCl 137 MCG/SPRAY SOLN 1 spray by Each Nare route 2 (two) times a day for 600 doses As directed 30 mL 2   • hydroxychloroquine (PLAQUENIL) 200 mg tablet Take 1 tablet (200 mg total) by mouth 2 (two) times a day with meals 90 tablet 3   • methocarbamol (ROBAXIN) 500 mg tablet Take 1 tablet (500 mg total) by mouth 2 (two) times a day as needed for muscle spasms 10 tablet 0   • naproxen (Naprosyn) 500 mg tablet Take 1 tablet (500 mg total) by mouth 2 (two) times a day with meals for 7 days 14 tablet 0   • oxyCODONE-acetaminophen (PERCOCET) 5-325 mg per tablet      • RESTASIS MULTIDOSE 0.05 % ophthalmic emulsion Administer to both eyes every 12 (twelve) hours         No current facility-administered medications on file prior to visit.      Social History     Tobacco Use   • Smoking status: Never   • Smokeless tobacco: Never   Vaping Use   • Vaping status: Never Used   Substance and Sexual Activity   • Alcohol use: No   •  "Drug use: No   • Sexual activity: Yes     Partners: Male     Birth control/protection: Female Sterilization        Objective  /78 (BP Location: Left arm, Patient Position: Sitting, Cuff Size: Standard)   Pulse 80   Ht 5' 7\" (1.702 m)   Wt 107 kg (236 lb 3.2 oz)   SpO2 97%   BMI 36.99 kg/m²      Physical Exam  Vitals reviewed.   Constitutional:       General: She is not in acute distress.     Appearance: Normal appearance. She is not ill-appearing.   HENT:      Head: Normocephalic and atraumatic.   Eyes:      General: No scleral icterus.     Conjunctiva/sclera: Conjunctivae normal.   Cardiovascular:      Rate and Rhythm: Normal rate and regular rhythm.   Pulmonary:      Effort: Pulmonary effort is normal. No respiratory distress.      Breath sounds: Normal breath sounds.   Abdominal:      General: Bowel sounds are normal. There is no distension.      Palpations: Abdomen is soft.      Tenderness: There is no abdominal tenderness. There is no guarding.   Skin:     General: Skin is warm and dry.   Neurological:      Mental Status: She is alert and oriented to person, place, and time.   Psychiatric:         Mood and Affect: Mood normal.         Behavior: Behavior normal.             Haresh Quesada PA-C  [facility] - Gastro[facility]      "

## 2024-12-20 NOTE — ASSESSMENT & PLAN NOTE
-Continue Prevacid but recommended to take daily.    - Plan EGD  - I have reviewed the risks of endoscopy which include but are not limited to; anesthesia complications, injury/perforation of the bowel, infection and bleeding.  Also recommend:  - avoid NSAIDS  - avoid eating within 3 hours of sleeping  - elevated head of bed 4-6 inches while sleeping  - avoid trigger foods  - recommend daily supply of calcium and vitamin D    Orders:  •  EGD; Future

## 2024-12-20 NOTE — H&P (VIEW-ONLY)
Name: Caty Dang      : 1960      MRN: [id number]  Encounter Provider: Haresh Quesada PA-C  Encounter Date: 2024   Encounter department: [facility] [place]  :  Assessment & Plan  Gastroesophageal reflux disease, unspecified whether esophagitis present  -Continue Prevacid but recommended to take daily.    - Plan EGD  - I have reviewed the risks of endoscopy which include but are not limited to; anesthesia complications, injury/perforation of the bowel, infection and bleeding.  Also recommend:  - avoid NSAIDS  - avoid eating within 3 hours of sleeping  - elevated head of bed 4-6 inches while sleeping  - avoid trigger foods  - recommend daily supply of calcium and vitamin D    Orders:  •  EGD; Future    Epigastric pain  - plan EGD  Orders:  •  EGD; Future    Early satiety  -If EGD unrevealing would consider gastric emptying study  Orders:  •  EGD; Future    History of colon polyps  -Last colonoscopy 8 to 9 years ago with polyps removed.  -Overdue for next surveillance colonoscopy  - I have reviewed the risks of endoscopy which include but are not limited to; anesthesia complications, injury/perforation of the bowel, infection and bleeding.      Orders:  •  Colonoscopy; Future    History of diverticulosis  -Good daily fluids and fiber  -Avoid constipation       History of diverticulitis    Orders:  •  Colonoscopy; Future        History of Present Illness   HPI  Caty Dang is a 64 y.o. female new to our office being seen in consult with past medical history of hypertension, GERD, hyperlipidemia, hypothyroidism, diverticulosis with diverticulitis who presents with concerns for GERD, decreased appetite and sense of early satiety.  She also reports history of diverticulitis around 11 years ago.  Her last EGD and colonoscopy was 8 to 9 years ago in [facility].  She does report likely polyps removed at that time.  She does report significant  decreased appetite.    She utilizes Prevacid more on an as-needed basis for her heartburn.  She has had several months of this epigastric pain.  She had subsequent mammogram and CT.  CT done in July 2024 showed cystic lesions in the liver.  She has known of her cysts.  She denies any change in bowels black or bloody stools.  She denies any significant NSAID use.  She denies any pain or difficulty swallowing.  No nausea or vomiting.    No pacemaker or defibrillator implanted.   No chronic kidney disease or solitary kidney.  Not on any supplemental oxygen at night.  Not on any antiplatelet or anticoagulants.  BMI is 36.99    Endoscopic History  EGD -8 to 9 years ago done for GERD, unrevealing per patient  Colonoscopy -8 to 9 years ago with possible polyps removed per patient      History obtained from: patient    Review of Systems   Constitutional:  Negative for activity change, appetite change, chills, diaphoresis, fatigue and unexpected weight change.   HENT:  Negative for mouth sores, sore throat and trouble swallowing.    Eyes:  Negative for pain, redness and visual disturbance.   Respiratory:  Negative for apnea, chest tightness and shortness of breath.    Cardiovascular:  Negative for chest pain and leg swelling.   Gastrointestinal:  Positive for abdominal distention and abdominal pain (Epigastric). Negative for anal bleeding, blood in stool, constipation, diarrhea, nausea and vomiting.        GERD, early satiety   Genitourinary:  Negative for difficulty urinating, dysuria and hematuria.   Musculoskeletal:  Negative for arthralgias, back pain, gait problem, joint swelling and myalgias.   Skin:  Negative for color change, pallor and rash.   Allergic/Immunologic: Negative for environmental allergies and food allergies.   Neurological:  Negative for dizziness, weakness, light-headedness, numbness and headaches.   Psychiatric/Behavioral:  Negative for agitation and behavioral problems.      Past Medical History  "  Past Medical History:   Diagnosis Date   • Allergic rhinitis     On amd off my whole life   • Disease of thyroid gland     Years ago   • Diverticulitis, colon    • Ear problems     Started at age 2   • Fatigue    • GERD (gastroesophageal reflux disease) On and off   • GERD (gastroesophageal reflux disease) 12/20/2024   • Headache(784.0)    • HL (hearing loss)    • Hypertension    • Hypothyroidism     h/o \" borderline \"  issues   • Lipoma of left shoulder 03/01/2024   • Liver cyst     last assessed 4/18/16   • Lyme disease    • Obesity     Long time   • Otitis media     On and off since i was 2   • Positive Anaplasma serology    • RA (rheumatoid arthritis) ([facility])    • Seizure disorder in pregnancy (HCC)     had 1 seizure x1 1986 during pregnancy- none after   • Seizures (HCC) 1986   • Tonsillitis     As a child   • Varicose vein of leg      Past Surgical History:   Procedure Laterality Date   • BACK SURGERY      fusion/refusion of 2-8 vertebrae   • COLECTOMY      partial sigmoid, last assessed 4/18/16   • EPIDURAL BLOCK INJECTION Bilateral 01/31/2024    Procedure: BILATERAL L3-L4 TRANSFORAMINAL EPIDURAL STEROID INJECTION;  Surgeon: Americo Darling DO;  Location: [facility] MAIN OR;  Service: Pain Management    • JOINT REPLACEMENT     • LAPAROSCOPY      with adnexectomy   • LIVER SURGERY     • AR SURGICAL ARTHROSCOPY LILLI W/CORACOACRM LIGM RLS Right 03/12/2020    Procedure: ARTHROSCOPY SHOULDER, BICEPS TENODESIS;  Surgeon: Teddy Keen MD;  Location: [facility] MAIN OR;  Service: Orthopedics   • AR SURGICAL ARTHROSCOPY SHOULDER W/ROTATOR CUFF RPR Left 01/03/2019    Procedure: REPAIR ROTATOR CUFF  ARTHROSCOPIC, BICEPTS TENODESIS, SUBACROMIAL DECOMPRESION;  Surgeon: Teddy Keen MD;  Location: [facility] MAIN OR;  Service: Orthopedics   • AR SURGICAL ARTHROSCOPY SHOULDER W/ROTATOR CUFF RPR Right 03/12/2020    Procedure: REPAIR ROTATOR CUFF  ARTHROSCOPIC WITH SUBACROMIAL DECOMPRESSION;  Surgeon: Teddy Keen MD;  " Location: [facility] MAIN OR;  Service: Orthopedics   • REPLACEMENT TOTAL KNEE Bilateral    • SHOULDER SURGERY     • SINUS SURGERY     • TONSILLECTOMY     • TYMPANOSTOMY TUBE PLACEMENT       Family History   Problem Relation Age of Onset   • Hypertension Mother    • Pancreatic cancer Mother 79   • Cancer Mother         Pancreatic cancer   • Arthritis Father    • Hyperthyroidism Father    • Hypertension Father    • Rheum arthritis Father    • No Known Problems Sister    • No Known Problems Daughter    • No Known Problems Daughter    • No Known Problems Daughter    • No Known Problems Maternal Grandmother    • No Known Problems Maternal Grandfather    • No Known Problems Paternal Grandmother    • No Known Problems Paternal Grandfather    • Drug abuse Brother    • Rheum arthritis Brother    • Mental illness Maternal Aunt         Schizophrenic   • No Known Problems Maternal Aunt    • No Known Problems Paternal Aunt    • No Known Problems Paternal Aunt    • No Known Problems Paternal Uncle    • Cancer Son         Chordoma on his spine   • ADD / ADHD Neg Hx    • Anesthesia problems Neg Hx    • Clotting disorder Neg Hx    • Collagen disease Neg Hx    • Diabetes Neg Hx    • Dislocations Neg Hx    • Learning disabilities Neg Hx    • Neurological problems Neg Hx    • Osteoporosis Neg Hx    • Rheumatologic disease Neg Hx    • Scoliosis Neg Hx    • Vascular Disease Neg Hx       reports that she has never smoked. She has never used smokeless tobacco. She reports that she does not drink alcohol and does not use drugs.  Current Outpatient Medications on File Prior to Visit   Medication Sig Dispense Refill   • amLODIPine (NORVASC) 2.5 mg tablet Take 1 tablet (2.5 mg total) by mouth daily 90 tablet 2   • amLODIPine (NORVASC) 5 mg tablet TAKE 1 TABLET (5 MG TOTAL) BY MOUTH DAILY. 90 tablet 1   • atenolol (TENORMIN) 100 mg tablet TAKE 1 TABLET BY MOUTH EVERY DAY 90 tablet 0   • Bioflavonoid Products (VITAMIN C PLUS PO) Take 1 capsule by mouth  daily     • Cholecalciferol (Vitamin D3) 75 MCG (3000 UT) TABS Take 1 capsule by mouth daily     • Cyanocobalamin 100 MCG LOZG Take 100 mcg by mouth daily     • Cyanocobalamin 5000 MCG/ML LIQD Place under the tongue     • dextromethorphan-guaifenesin (MUCINEX DM)  MG per 12 hr tablet Take 1 tablet by mouth daily     • fluticasone (FLONASE) 50 mcg/act nasal spray SPRAY 2 SPRAYS INTO EACH NOSTRIL EVERY DAY 48 mL 1   • hydroCHLOROthiazide 25 mg tablet TAKE 1 TABLET (25 MG TOTAL) BY MOUTH DAILY. 90 tablet 6   • loratadine (Claritin) 10 mg tablet Take 10 mg by mouth daily     • Omega-3 Fatty Acids (Fish Oil) 1200 MG CAPS Take by mouth     • traMADol (ULTRAM) 50 mg tablet TAKE 1 TABLET (50 MG TOTAL) BY MOUTH EVERY 12 (TWELVE) HOURS AS NEEDED FOR SEVERE PAIN 60 tablet 0   • triamcinolone (KENALOG) 0.5 % cream Apply 1 Application topically 2 (two) times a day To affected area     • Azelastine HCl 137 MCG/SPRAY SOLN 1 spray by Each Nare route 2 (two) times a day for 600 doses As directed 30 mL 2   • hydroxychloroquine (PLAQUENIL) 200 mg tablet Take 1 tablet (200 mg total) by mouth 2 (two) times a day with meals 90 tablet 3   • methocarbamol (ROBAXIN) 500 mg tablet Take 1 tablet (500 mg total) by mouth 2 (two) times a day as needed for muscle spasms 10 tablet 0   • naproxen (Naprosyn) 500 mg tablet Take 1 tablet (500 mg total) by mouth 2 (two) times a day with meals for 7 days 14 tablet 0   • oxyCODONE-acetaminophen (PERCOCET) 5-325 mg per tablet      • RESTASIS MULTIDOSE 0.05 % ophthalmic emulsion Administer to both eyes every 12 (twelve) hours         No current facility-administered medications on file prior to visit.     Allergies   Allergen Reactions   • Bee Pollen Other (See Comments)     headaches    headaches   headaches   • Pollen Extract Other (See Comments)     headaches      Current Outpatient Medications on File Prior to Visit   Medication Sig Dispense Refill   • amLODIPine (NORVASC) 2.5 mg tablet Take 1  tablet (2.5 mg total) by mouth daily 90 tablet 2   • amLODIPine (NORVASC) 5 mg tablet TAKE 1 TABLET (5 MG TOTAL) BY MOUTH DAILY. 90 tablet 1   • atenolol (TENORMIN) 100 mg tablet TAKE 1 TABLET BY MOUTH EVERY DAY 90 tablet 0   • Bioflavonoid Products (VITAMIN C PLUS PO) Take 1 capsule by mouth daily     • Cholecalciferol (Vitamin D3) 75 MCG (3000 UT) TABS Take 1 capsule by mouth daily     • Cyanocobalamin 100 MCG LOZG Take 100 mcg by mouth daily     • Cyanocobalamin 5000 MCG/ML LIQD Place under the tongue     • dextromethorphan-guaifenesin (MUCINEX DM)  MG per 12 hr tablet Take 1 tablet by mouth daily     • fluticasone (FLONASE) 50 mcg/act nasal spray SPRAY 2 SPRAYS INTO EACH NOSTRIL EVERY DAY 48 mL 1   • hydroCHLOROthiazide 25 mg tablet TAKE 1 TABLET (25 MG TOTAL) BY MOUTH DAILY. 90 tablet 6   • loratadine (Claritin) 10 mg tablet Take 10 mg by mouth daily     • Omega-3 Fatty Acids (Fish Oil) 1200 MG CAPS Take by mouth     • traMADol (ULTRAM) 50 mg tablet TAKE 1 TABLET (50 MG TOTAL) BY MOUTH EVERY 12 (TWELVE) HOURS AS NEEDED FOR SEVERE PAIN 60 tablet 0   • triamcinolone (KENALOG) 0.5 % cream Apply 1 Application topically 2 (two) times a day To affected area     • Azelastine HCl 137 MCG/SPRAY SOLN 1 spray by Each Nare route 2 (two) times a day for 600 doses As directed 30 mL 2   • hydroxychloroquine (PLAQUENIL) 200 mg tablet Take 1 tablet (200 mg total) by mouth 2 (two) times a day with meals 90 tablet 3   • methocarbamol (ROBAXIN) 500 mg tablet Take 1 tablet (500 mg total) by mouth 2 (two) times a day as needed for muscle spasms 10 tablet 0   • naproxen (Naprosyn) 500 mg tablet Take 1 tablet (500 mg total) by mouth 2 (two) times a day with meals for 7 days 14 tablet 0   • oxyCODONE-acetaminophen (PERCOCET) 5-325 mg per tablet      • RESTASIS MULTIDOSE 0.05 % ophthalmic emulsion Administer to both eyes every 12 (twelve) hours         No current facility-administered medications on file prior to visit.     "  Social History     Tobacco Use   • Smoking status: Never   • Smokeless tobacco: Never   Vaping Use   • Vaping status: Never Used   Substance and Sexual Activity   • Alcohol use: No   • Drug use: No   • Sexual activity: Yes     Partners: Male     Birth control/protection: Female Sterilization        Objective   /78 (BP Location: Left arm, Patient Position: Sitting, Cuff Size: Standard)   Pulse 80   Ht 5' 7\" (1.702 m)   Wt 107 kg (236 lb 3.2 oz)   SpO2 97%   BMI 36.99 kg/m²      Physical Exam  Vitals reviewed.   Constitutional:       General: She is not in acute distress.     Appearance: Normal appearance. She is not ill-appearing.   HENT:      Head: Normocephalic and atraumatic.   Eyes:      General: No scleral icterus.     Conjunctiva/sclera: Conjunctivae normal.   Cardiovascular:      Rate and Rhythm: Normal rate and regular rhythm.   Pulmonary:      Effort: Pulmonary effort is normal. No respiratory distress.      Breath sounds: Normal breath sounds.   Abdominal:      General: Bowel sounds are normal. There is no distension.      Palpations: Abdomen is soft.      Tenderness: There is no abdominal tenderness. There is no guarding.   Skin:     General: Skin is warm and dry.   Neurological:      Mental Status: She is alert and oriented to person, place, and time.   Psychiatric:         Mood and Affect: Mood normal.         Behavior: Behavior normal.             Haresh Quesada PA-C  [facility] - Gastroentrology    "

## 2024-12-20 NOTE — ASSESSMENT & PLAN NOTE
-Last colonoscopy 8 to 9 years ago with polyps removed.  -Overdue for next surveillance colonoscopy  - I have reviewed the risks of endoscopy which include but are not limited to; anesthesia complications, injury/perforation of the bowel, infection and bleeding.      Orders:  •  Colonoscopy; Future

## 2024-12-29 DIAGNOSIS — M15.9 OSTEOARTHRITIS OF MULTIPLE JOINTS, UNSPECIFIED OSTEOARTHRITIS TYPE: ICD-10-CM

## 2024-12-30 ENCOUNTER — ANESTHESIA EVENT (OUTPATIENT)
Dept: ANESTHESIOLOGY | Facility: HOSPITAL | Age: 64
End: 2024-12-30

## 2024-12-30 ENCOUNTER — ANESTHESIA (OUTPATIENT)
Dept: ANESTHESIOLOGY | Facility: HOSPITAL | Age: 64
End: 2024-12-30

## 2024-12-30 RX ORDER — TRAMADOL HYDROCHLORIDE 50 MG/1
50 TABLET ORAL EVERY 12 HOURS PRN
Qty: 60 TABLET | Refills: 0 | Status: SHIPPED | OUTPATIENT
Start: 2024-12-30

## 2025-01-02 ENCOUNTER — HOSPITAL ENCOUNTER (OUTPATIENT)
Dept: GASTROENTEROLOGY | Facility: AMBULARY SURGERY CENTER | Age: 65
Setting detail: OUTPATIENT SURGERY
End: 2025-01-02
Attending: INTERNAL MEDICINE
Payer: MEDICARE

## 2025-01-02 ENCOUNTER — ANESTHESIA (OUTPATIENT)
Dept: GASTROENTEROLOGY | Facility: AMBULARY SURGERY CENTER | Age: 65
End: 2025-01-02
Payer: MEDICARE

## 2025-01-02 VITALS
DIASTOLIC BLOOD PRESSURE: 79 MMHG | HEIGHT: 67 IN | SYSTOLIC BLOOD PRESSURE: 149 MMHG | WEIGHT: 236 LBS | TEMPERATURE: 98.8 F | OXYGEN SATURATION: 98 % | RESPIRATION RATE: 15 BRPM | BODY MASS INDEX: 37.04 KG/M2 | HEART RATE: 55 BPM

## 2025-01-02 DIAGNOSIS — R68.81 EARLY SATIETY: ICD-10-CM

## 2025-01-02 DIAGNOSIS — R10.13 EPIGASTRIC PAIN: ICD-10-CM

## 2025-01-02 DIAGNOSIS — Z87.19 HISTORY OF DIVERTICULITIS: ICD-10-CM

## 2025-01-02 DIAGNOSIS — K21.9 GASTROESOPHAGEAL REFLUX DISEASE, UNSPECIFIED WHETHER ESOPHAGITIS PRESENT: ICD-10-CM

## 2025-01-02 DIAGNOSIS — Z86.0100 HISTORY OF COLON POLYPS: ICD-10-CM

## 2025-01-02 PROCEDURE — 88305 TISSUE EXAM BY PATHOLOGIST: CPT | Performed by: PATHOLOGY

## 2025-01-02 PROCEDURE — 43239 EGD BIOPSY SINGLE/MULTIPLE: CPT | Performed by: INTERNAL MEDICINE

## 2025-01-02 PROCEDURE — 45385 COLONOSCOPY W/LESION REMOVAL: CPT | Performed by: INTERNAL MEDICINE

## 2025-01-02 RX ORDER — PROPOFOL 10 MG/ML
INJECTION, EMULSION INTRAVENOUS AS NEEDED
Status: DISCONTINUED | OUTPATIENT
Start: 2025-01-02 | End: 2025-01-02

## 2025-01-02 RX ORDER — LIDOCAINE HYDROCHLORIDE 10 MG/ML
INJECTION, SOLUTION EPIDURAL; INFILTRATION; INTRACAUDAL; PERINEURAL AS NEEDED
Status: DISCONTINUED | OUTPATIENT
Start: 2025-01-02 | End: 2025-01-02

## 2025-01-02 RX ORDER — SODIUM CHLORIDE, SODIUM LACTATE, POTASSIUM CHLORIDE, CALCIUM CHLORIDE 600; 310; 30; 20 MG/100ML; MG/100ML; MG/100ML; MG/100ML
INJECTION, SOLUTION INTRAVENOUS CONTINUOUS PRN
Status: DISCONTINUED | OUTPATIENT
Start: 2025-01-02 | End: 2025-01-02

## 2025-01-02 RX ORDER — SODIUM CHLORIDE, SODIUM LACTATE, POTASSIUM CHLORIDE, CALCIUM CHLORIDE 600; 310; 30; 20 MG/100ML; MG/100ML; MG/100ML; MG/100ML
125 INJECTION, SOLUTION INTRAVENOUS CONTINUOUS
Status: DISCONTINUED | OUTPATIENT
Start: 2025-01-02 | End: 2025-01-06 | Stop reason: HOSPADM

## 2025-01-02 RX ORDER — PROPOFOL 10 MG/ML
INJECTION, EMULSION INTRAVENOUS CONTINUOUS PRN
Status: DISCONTINUED | OUTPATIENT
Start: 2025-01-02 | End: 2025-01-02

## 2025-01-02 RX ADMIN — PROPOFOL 150 MG: 10 INJECTION, EMULSION INTRAVENOUS at 12:03

## 2025-01-02 RX ADMIN — SODIUM CHLORIDE, SODIUM LACTATE, POTASSIUM CHLORIDE, AND CALCIUM CHLORIDE: .6; .31; .03; .02 INJECTION, SOLUTION INTRAVENOUS at 11:54

## 2025-01-02 RX ADMIN — PROPOFOL 130 MCG/KG/MIN: 10 INJECTION, EMULSION INTRAVENOUS at 12:05

## 2025-01-02 RX ADMIN — PROPOFOL 50 MG: 10 INJECTION, EMULSION INTRAVENOUS at 12:04

## 2025-01-02 RX ADMIN — LIDOCAINE HYDROCHLORIDE 50 MG: 10 INJECTION, SOLUTION EPIDURAL; INFILTRATION; INTRACAUDAL; PERINEURAL at 12:03

## 2025-01-02 RX ADMIN — PROPOFOL 50 MG: 10 INJECTION, EMULSION INTRAVENOUS at 12:15

## 2025-01-02 RX ADMIN — SODIUM CHLORIDE, SODIUM LACTATE, POTASSIUM CHLORIDE, AND CALCIUM CHLORIDE: .6; .31; .03; .02 INJECTION, SOLUTION INTRAVENOUS at 11:59

## 2025-01-02 NOTE — ANESTHESIA POSTPROCEDURE EVALUATION
Post-Op Assessment Note    CV Status:  Stable  Pain Score: 0    Pain management: adequate       Mental Status:  Sleepy   Hydration Status:  Euvolemic   PONV Controlled:  Controlled   Airway Patency:  Patent     Post Op Vitals Reviewed: Yes    No anethesia notable event occurred.    Staff: Anesthesiologist, CRNA           Last Filed PACU Vitals:  Vitals Value Taken Time   Temp     Pulse 55 01/02/25 1217   /65 01/02/25 1217   Resp 12 01/02/25 1217   SpO2 99 % 01/02/25 1217

## 2025-01-02 NOTE — ANESTHESIA PREPROCEDURE EVALUATION
Procedure:  EGD  COLONOSCOPY    Relevant Problems   CARDIO   (+) Essential hypertension      GI/HEPATIC   (+) GERD (gastroesophageal reflux disease)      MUSCULOSKELETAL   (+) Rheumatoid arthritis involving multiple sites, unspecified whether rheumatoid factor present (HCC)        Physical Exam    Airway    Mallampati score: III  TM Distance: >3 FB  Neck ROM: full     Dental   No notable dental hx     Cardiovascular  Rhythm: regular, Rate: normal, Cardiovascular exam normal    Pulmonary   Decreased breath sounds    Other Findings  ECHO Feb 2024:     post-pubertal.      Anesthesia Plan  ASA Score- 2     Anesthesia Type- IV sedation with anesthesia with ASA Monitors.         Additional Monitors:     Airway Plan:            Plan Factors-Exercise tolerance (METS): >4 METS.    Chart reviewed. EKG reviewed. Imaging results reviewed. Existing labs reviewed. Patient summary reviewed.    Patient is not a current smoker.  Patient did not smoke on day of surgery.            Induction- intravenous.    Postoperative Plan-     Perioperative Resuscitation Plan - Level 1 - Full Code.       Informed Consent- Anesthetic plan and risks discussed with patient.  I personally reviewed this patient with the CRNA. Discussed and agreed on the Anesthesia Plan with the CRNA..

## 2025-01-02 NOTE — DISCHARGE INSTRUCTIONS
Upper Endoscopy and Colonoscopy   WHAT YOU NEED TO KNOW:   An upper endoscopy is also called an upper gastrointestinal (GI) endoscopy, or an esophagogastroduodenoscopy (EGD). It is a procedure to examine the inside of your esophagus, stomach, and duodenum (first part of the small intestine) with a scope. You may feel bloated, gassy, or have some abdominal discomfort after your procedure. Your throat may be sore for 24 to 36 hours. You may burp or pass gas from air that is still inside your body.                A colonoscopy is a procedure to examine the inside of your colon (intestine) with a scope. Polyps or tissue growths may have been removed during your colonoscopy. It is normal to feel bloated and to have some abdominal discomfort. You should be passing gas. If you have hemorrhoids or you had polyps removed, you may have a small amount of bleeding.          DISCHARGE INSTRUCTIONS:   Seek care immediately if:   You have sudden, severe abdominal pain.     You have problems swallowing.     You have a large amount of black, sticky bowel movements or blood in your bowel movements.     You have sudden trouble breathing.     You feel weak, lightheaded, or faint or your heart beats faster than normal for you.     Contact your healthcare provider if:   You have a fever and chills.      You have nausea or are vomiting.      Your abdomen is bloated or feels full and hard.     You have abdominal pain.    You have a large amount of black, sticky bowel movements or blood in your bowel movements.    You have not had a bowel movement for 3 days after your procedure.    You have rash or hives.    You have questions or concerns about your procedure.     Activity:   ·       Do not lift, strain, or run for 24 hours after your procedure.     ·       Rest after your procedure. You have been given medicine to relax you. Do not drive or make important decisions until the day after your procedure. Return to your normal activity as  directed.     ·       Relieve gas and discomfort from bloating by lying on your right side with a heating pad on your abdomen. You may need to take short walks to help the gas move out. Eat small meals until bloating is relieved.  Follow up with your healthcare provider as directed: Write down your questions so you remember to ask them during your visits.      If you take a “blood thinner”, please review the specific instructions from your endoscopist about when you should resume it. These can be found in the “Recommendation” and “Your Medication list” sections of this After Visit Summary.      no

## 2025-01-02 NOTE — INTERVAL H&P NOTE
H&P reviewed. After examining the patient I find no changes in the patients condition since the H&P had been written.    Vitals:    01/02/25 1040   BP: 167/72   Pulse: 56   Resp: 18   Temp: 98.8 °F (37.1 °C)   SpO2: 97%

## 2025-01-06 ENCOUNTER — RESULTS FOLLOW-UP (OUTPATIENT)
Dept: GASTROENTEROLOGY | Facility: CLINIC | Age: 65
End: 2025-01-06

## 2025-01-06 PROCEDURE — 88305 TISSUE EXAM BY PATHOLOGIST: CPT | Performed by: PATHOLOGY

## 2025-02-11 ENCOUNTER — TELEPHONE (OUTPATIENT)
Age: 65
End: 2025-02-11

## 2025-02-11 NOTE — TELEPHONE ENCOUNTER
Caller: Janett BACK    Doctor: Dr. Darling     Reason for call: Schedule an injection     Call back#: [phone]

## 2025-02-11 NOTE — TELEPHONE ENCOUNTER
Returned call to patient, she had surgery a few months ago with a Doctor in [place], [place].  The doctor is requesting Dr. Darling do a procedure to help with her hamstring pain.  I advised patient we would need to see her in the office for a re-eval before we would proceed.  She will call back to schedule when she has the script from the other doctor as to what he wants done.

## 2025-02-11 NOTE — TELEPHONE ENCOUNTER
Okay sounds good  I do not do any hamstring injections but we will gladly see her in office for reevaluation  Thank you

## 2025-02-26 DIAGNOSIS — M15.9 OSTEOARTHRITIS OF MULTIPLE JOINTS, UNSPECIFIED OSTEOARTHRITIS TYPE: ICD-10-CM

## 2025-02-26 RX ORDER — TRAMADOL HYDROCHLORIDE 50 MG/1
50 TABLET ORAL EVERY 12 HOURS PRN
Qty: 60 TABLET | Refills: 0 | Status: SHIPPED | OUTPATIENT
Start: 2025-02-26

## 2025-03-13 ENCOUNTER — HOSPITAL ENCOUNTER (OUTPATIENT)
Dept: RADIOLOGY | Age: 65
Discharge: HOME | End: 2025-03-13
Payer: MEDICARE

## 2025-03-13 VITALS — WEIGHT: 230 LBS | HEIGHT: 67 IN | BODY MASS INDEX: 36.1 KG/M2

## 2025-03-13 DIAGNOSIS — Z12.31 ENCOUNTER FOR SCREENING MAMMOGRAM FOR BREAST CANCER: ICD-10-CM

## 2025-03-13 PROCEDURE — 77063 BREAST TOMOSYNTHESIS BI: CPT

## 2025-03-13 PROCEDURE — 77067 SCR MAMMO BI INCL CAD: CPT

## 2025-03-20 ENCOUNTER — RESULTS FOLLOW-UP (OUTPATIENT)
Dept: OTHER | Facility: HOSPITAL | Age: 65
End: 2025-03-20

## 2025-03-30 DIAGNOSIS — M15.9 OSTEOARTHRITIS OF MULTIPLE JOINTS, UNSPECIFIED OSTEOARTHRITIS TYPE: ICD-10-CM

## 2025-03-31 NOTE — TELEPHONE ENCOUNTER
Per PDMP review, patient was prescribed hydromorphone on 3/7/2025 from a different provider.  Would not recommend combining pain medicines.  If patient is getting additional controlled medications from another provider, I do not feel comfortable refilling her tramadol.

## 2025-03-31 NOTE — TELEPHONE ENCOUNTER
03/07/2025 03/07/2025 HYDROmorphone HCL (Tablet) 28.0 7 4 MG 64.0 [facility] Medicare 0 / 0 PA   1 [id number] 02/26/2025 02/26/2025 traMADol HCL (Tablet) 60.0 30 50 MG 20.0 [facility] Medicare 0 / 0 PA   1 [id number] 01/28/2025 01/28/2025 traMADol HCL (Tablet) 60.0 30 50 MG 20.0 VARUN OLSEN [facility] Medicare 0 / 0 PA   1 [id number] 11/27/2024 11/27/2024 traMADol HCL (Tablet) 60.0 30 50 MG 20.0 [facility] Medicare 0 / 0 PA   1 [id number] 11/07/2024 11/07/2024 HYDROmorphone HCL (Tablet) 8.0 2 4 MG 64.0 [facility] Medicare 0 / 0 PA   1 [id number] 10/21/2024 10/21/2024 traMADol HCL (Tablet) 60.0 30 50 MG 20.0 [facility] Medicare 0 / 0 PA   1 [id number] 10/09/2024 10/09/2024 oxyCODONE HYDROCHLORIDE 5 MG ORAL TABLET/ACETAMINOPHEN 325 MG (Tablet) 15.0 2 325 MG-5 MG 56.25 [facility] Medicare 0 / 0 PA   1 [id number] 09/15/2024 09/13/2024 traMADol HCL (Tablet) 60.0 30 50 MG 20.0 [facility] Medicare 0 / 0 PA   1 [id number] 08/16/2024 06/10/2024 traMADol HCL (Tablet) 60.0 30 50 MG 20.0 [facility] Medicare 2 / 2 PA   1 [id number] 08/16/2024 08/16/2024 oxyCODONE HYDROCHLORIDE 5 MG ORA

## 2025-04-01 RX ORDER — TRAMADOL HYDROCHLORIDE 50 MG/1
50 TABLET ORAL EVERY 12 HOURS PRN
Qty: 60 TABLET | Refills: 0 | Status: SHIPPED | OUTPATIENT
Start: 2025-04-01

## 2025-04-07 ENCOUNTER — OFFICE VISIT (OUTPATIENT)
Dept: RHEUMATOLOGY | Facility: CLINIC | Age: 65
End: 2025-04-07
Payer: MEDICARE

## 2025-04-07 VITALS
WEIGHT: 231 LBS | BODY MASS INDEX: 36.26 KG/M2 | OXYGEN SATURATION: 98 % | SYSTOLIC BLOOD PRESSURE: 128 MMHG | HEART RATE: 59 BPM | DIASTOLIC BLOOD PRESSURE: 70 MMHG | HEIGHT: 67 IN

## 2025-04-07 DIAGNOSIS — M15.9 OSTEOARTHRITIS OF MULTIPLE JOINTS, UNSPECIFIED OSTEOARTHRITIS TYPE: ICD-10-CM

## 2025-04-07 DIAGNOSIS — Z79.899 LONG-TERM USE OF HYDROXYCHLOROQUINE: ICD-10-CM

## 2025-04-07 DIAGNOSIS — M06.00 SERONEGATIVE RHEUMATOID ARTHRITIS (HCC): Primary | ICD-10-CM

## 2025-04-07 DIAGNOSIS — M35.00 SICCA SYNDROME (HCC): ICD-10-CM

## 2025-04-07 PROCEDURE — 99214 OFFICE O/P EST MOD 30 MIN: CPT | Performed by: STUDENT IN AN ORGANIZED HEALTH CARE EDUCATION/TRAINING PROGRAM

## 2025-04-07 PROCEDURE — G2211 COMPLEX E/M VISIT ADD ON: HCPCS | Performed by: STUDENT IN AN ORGANIZED HEALTH CARE EDUCATION/TRAINING PROGRAM

## 2025-04-07 RX ORDER — HYDROXYCHLOROQUINE SULFATE 200 MG/1
200 TABLET, FILM COATED ORAL 2 TIMES DAILY WITH MEALS
Qty: 180 TABLET | Refills: 1 | Status: SHIPPED | OUTPATIENT
Start: 2025-04-07 | End: 2025-10-04

## 2025-04-07 RX ORDER — METHYLPREDNISOLONE 4 MG/1
4 TABLET ORAL DAILY
COMMUNITY
Start: 2025-04-03

## 2025-04-07 NOTE — PROGRESS NOTES
Ambulatory Visit  Name: Caty Dang      : 1960      MRN: [id number]  Encounter Provider: Caro Sanchez DO  Encounter Date: 2025   Encounter department: [facility] [place]    Assessment & Plan  Seronegative rheumatoid arthritis (HCC)  Patient is a 64-year-old female presenting for follow-up of seronegative RA.  Patient is on Plaquenil 200 mg twice daily and doing well.  No joint swelling.  No synovitis on exam today.  Will continue current therapy.  -Continue hydroxychloroquine 200 mg twice daily  -Discussed with patient that it is safe to continue hydroxychloroquine during surgery  -Continue routine retinal screenings while on Plaquenil  Orders:    hydroxychloroquine (PLAQUENIL) 200 mg tablet; Take 1 tablet (200 mg total) by mouth 2 (two) times a day with meals    Osteoarthritis of multiple joints, unspecified osteoarthritis type  Patient noted to have significant osteoarthritic changes on imaging and exam. Patient takes tramadol, Tylenol and NSAIDs.   -Continue tramadol 50 mg every 12 hours as needed  - Continue Tylenol Tylenol, no more than 3 g a day         Sicca syndrome (HCC)  Patient continues to have dry eyes and dry mouth.  No symptoms fluctuate.  Reports she was doing really well without any medications, however recently the dryness has been worse since allergy season.  Has restarted Restasis drops.  -Follow-up on Sjogren's antibody testing       Long-term use of hydroxychloroquine  Routine retinal screening while on hydroxychloroquine       RTC in 6-7 months     History of Present Illness     Caty Dang is a 64 y.o. female with a history of lumbar radiculopathy, bilateral rotator cuff tears, DJD, L4-L5 fusion, OA status post bilateral TKA, history of diverticulosis, fatty liver disease, multicystic liver disease and hypertension who presents for follow-up of seronegative erosive rheumatoid arthritis.    History obtained from : patient    Patient has been  requiring surgeries due to a tear of her hamstrings.  Patient states she was recommended to stop hydroxychloroquine before the surgery.  States when she comes off the medicine she has worsening bone pain and tingling.  Denies any joint swelling or prolonged morning stiffness.  Takes hydroxychloroquine 200 mg twice daily and follows with ophthalmology regularly.    Currently has noticed worsening dryness since worsening allergies.  Has restarted Restasis drops.    Patient takes tramadol for OA related pain.  Patient states she received hydrocodone postoperatively, however did not take that as it did not make her feel well.      Patient's daughter was diagnosed with Sjogren's syndrome.    Rheumatic Disease Summary:  Seronegative erosive RA  Patient was first diagnosed with seronegative rheumatoid arthritis by Dr. Carrera around 2017.  Patient was first seen here on 1/19/2024.  Patient was following with Ayush Gerard PA-C.  Patient has a history of erosive arthritis of the MCPs of the left hand and degenerative changes based on x-ray.  Patient had no inflammatory changes on exam during office visit.  Patient had previously been on hydroxychloroquine, meloxicam and tramadol.  Patient has been continued on hydroxychloroquine 200 mg twice daily along with NSAIDs.    Patient has a history of dry eyes for which she uses Restasis drops.    OA  -s/p bilateral TKA  -Tramadol 50 twice daily (Continued from previous rheumatologist)      Review of Systems   Constitutional:  Negative for activity change, chills and fever.   HENT:  Negative for mouth sores.    Eyes:  Negative for pain.   Respiratory:  Negative for cough and shortness of breath.    Cardiovascular:  Negative for chest pain and leg swelling.   Gastrointestinal:  Negative for abdominal pain.   Musculoskeletal:  Positive for arthralgias and back pain. Negative for joint swelling.   Skin:  Negative for color change and rash.       Past Medical History:   Diagnosis Date  "   Allergic rhinitis     On amd off my whole life    Disease of thyroid gland     Years ago    Diverticulitis, colon     Ear problems     Started at age 2    Fatigue     GERD (gastroesophageal reflux disease) On and off    GERD (gastroesophageal reflux disease) 12/20/2024    Headache(784.0)     HL (hearing loss)     Hypertension     Hypothyroidism     h/o \" borderline \"  issues    Lipoma of left shoulder 03/01/2024    Liver cyst     last assessed 4/18/16    Lyme disease     Obesity     Long time    Otitis media     On and off since i was 2    Positive Anaplasma serology     RA (rheumatoid arthritis) (HCC)     Seizure disorder in pregnancy (HCC)     had 1 seizure x1 1986 during pregnancy- none after    Seizures (HCC) 1986    Tonsillitis     As a child    Varicose vein of leg        Current Outpatient Medications on File Prior to Visit   Medication Sig Dispense Refill    amLODIPine (NORVASC) 2.5 mg tablet Take 1 tablet (2.5 mg total) by mouth daily 90 tablet 2    amLODIPine (NORVASC) 5 mg tablet TAKE 1 TABLET (5 MG TOTAL) BY MOUTH DAILY. 90 tablet 1    atenolol (TENORMIN) 100 mg tablet TAKE 1 TABLET BY MOUTH EVERY DAY 90 tablet 0    Bioflavonoid Products (VITAMIN C PLUS PO) Take 1 capsule by mouth daily      Cholecalciferol (Vitamin D3) 75 MCG (3000 UT) TABS Take 1 capsule by mouth daily      Cyanocobalamin 100 MCG LOZG Take 100 mcg by mouth daily      Cyanocobalamin 5000 MCG/ML LIQD Place under the tongue      dextromethorphan-guaifenesin (MUCINEX DM)  MG per 12 hr tablet Take 1 tablet by mouth daily      fluticasone (FLONASE) 50 mcg/act nasal spray SPRAY 2 SPRAYS INTO EACH NOSTRIL EVERY DAY 48 mL 1    hydroCHLOROthiazide 25 mg tablet TAKE 1 TABLET (25 MG TOTAL) BY MOUTH DAILY. 90 tablet 6    loratadine (Claritin) 10 mg tablet Take 10 mg by mouth daily      methylPREDNISolone 4 MG tablet therapy pack Take 4 mg by mouth daily      Omega-3 Fatty Acids (Fish Oil) 1200 MG CAPS Take by mouth      traMADol (ULTRAM) " "50 mg tablet TAKE 1 TABLET (50 MG TOTAL) BY MOUTH EVERY 12 (TWELVE) HOURS AS NEEDED FOR SEVERE PAIN 60 tablet 0    triamcinolone (KENALOG) 0.5 % cream Apply 1 Application topically 2 (two) times a day To affected area      [DISCONTINUED] Azelastine HCl 137 MCG/SPRAY SOLN 1 spray by Each Nare route 2 (two) times a day for 600 doses As directed 30 mL 2    [DISCONTINUED] methocarbamol (ROBAXIN) 500 mg tablet Take 1 tablet (500 mg total) by mouth 2 (two) times a day as needed for muscle spasms 10 tablet 0    [DISCONTINUED] naproxen (Naprosyn) 500 mg tablet Take 1 tablet (500 mg total) by mouth 2 (two) times a day with meals for 7 days 14 tablet 0    [DISCONTINUED] oxyCODONE-acetaminophen (PERCOCET) 5-325 mg per tablet       [DISCONTINUED] RESTASIS MULTIDOSE 0.05 % ophthalmic emulsion Administer to both eyes every 12 (twelve) hours         No current facility-administered medications on file prior to visit.      Social History     Tobacco Use    Smoking status: Never    Smokeless tobacco: Never   Vaping Use    Vaping status: Never Used   Substance and Sexual Activity    Alcohol use: No    Drug use: No    Sexual activity: Yes     Partners: Male     Birth control/protection: Female Sterilization         Objective     /70   Pulse 59   Ht 5' 7\" (1.702 m)   Wt 105 kg (231 lb)   SpO2 98%   BMI 36.18 kg/m²     Physical Exam  General appearance: normal appearing, no acute distress  Skin: normal, no rashes  HEENT: normal, moist oropharynx, no nasal or oral ulcers  Lymph nodes: no palpable adenopathy  Lungs: normal respiratory effort, comfortable on room air, lungs clear to auscultation b/l   Heart: normal heart sounds, normal rate, normal rhythm,  Abdomen: soft, normal bowel sounds, no tenderness  Neurologic: no obvious neurological deficits   Extremities: no edema, warm and well perfused     Musculoskeletal Exam:   - Observation: no obvious joint abnormalities    - Palpation: no joint tenderness  - Synovitis: " absent  - Joint effusions: absent        I have independently reviewed the following labs/images and interpret them as follows.   01/19/24 10:44   MILAGROS SCREEN Negative   CYCLIC CITRULLINATED PEPTIDE ANTIBODY 1.9   RHEUMATOID FACTOR Negative   C-REACTIVE PROTEIN <1.0       Caro Sanchez DO, CCD, [facility]

## 2025-04-30 DIAGNOSIS — M15.9 OSTEOARTHRITIS OF MULTIPLE JOINTS, UNSPECIFIED OSTEOARTHRITIS TYPE: ICD-10-CM

## 2025-05-01 RX ORDER — TRAMADOL HYDROCHLORIDE 50 MG/1
50 TABLET ORAL EVERY 12 HOURS PRN
Qty: 60 TABLET | Refills: 0 | Status: SHIPPED | OUTPATIENT
Start: 2025-05-01

## 2025-05-19 ENCOUNTER — HOSPITAL ENCOUNTER (OUTPATIENT)
Dept: RADIOLOGY | Facility: HOSPITAL | Age: 65
Discharge: HOME | End: 2025-05-19
Payer: MEDICARE

## 2025-05-19 DIAGNOSIS — M25.551 RIGHT HIP PAIN: ICD-10-CM

## 2025-05-19 PROCEDURE — 73522 X-RAY EXAM HIPS BI 3-4 VIEWS: CPT

## 2025-05-29 DIAGNOSIS — M15.9 OSTEOARTHRITIS OF MULTIPLE JOINTS, UNSPECIFIED OSTEOARTHRITIS TYPE: ICD-10-CM

## 2025-05-30 RX ORDER — TRAMADOL HYDROCHLORIDE 50 MG/1
50 TABLET ORAL EVERY 12 HOURS PRN
Qty: 60 TABLET | Refills: 0 | Status: SHIPPED | OUTPATIENT
Start: 2025-05-30

## 2025-05-30 NOTE — TELEPHONE ENCOUNTER
05/01/2025 05/01/2025 traMADol HCL (Tablet) 60.0 30 50 MG 20.0 [facility] Medicare 0 / 0 PA   1 [id number] ** 04/01/2025 04/01/2025 traMADol HCL (Tablet) 60.0 30 50 MG 20.0 [facility] Medicare 0 / 0 PA   1 [id number] ** 03/07/2025 03/07/2025 HYDROmorphone HCL (Tablet) 28.0 7 4 MG 64.0 [facility] Medicare 0 / 0 PA   1 [id number] ** 02/26/2025 02/26/2025 traMADol HCL (Tablet) 60.0 30 50 MG 20.0 [facility] Medicare 0 / 0 PA   1 [id number] ** 01/28/2025 01/28/2025 traMADol HCL (Tablet) 60.0 30 50 MG 20.0 VARUN OLSEN [facility] Medicare 0 / 0 PA   1 [id number] ** 11/27/2024 11/27/2024 traMADol HCL (Tablet) 60.0 30 50 MG 20.0 [facility] Medicare 0 / 0 PA   1 [id number] ** 11/07/2024 11/07/2024 Rafael

## 2025-07-01 DIAGNOSIS — M15.9 OSTEOARTHRITIS OF MULTIPLE JOINTS, UNSPECIFIED OSTEOARTHRITIS TYPE: ICD-10-CM

## 2025-07-02 RX ORDER — TRAMADOL HYDROCHLORIDE 50 MG/1
50 TABLET ORAL EVERY 12 HOURS PRN
Qty: 60 TABLET | Refills: 0 | Status: SHIPPED | OUTPATIENT
Start: 2025-07-02

## 2025-08-01 DIAGNOSIS — M15.9 OSTEOARTHRITIS OF MULTIPLE JOINTS, UNSPECIFIED OSTEOARTHRITIS TYPE: ICD-10-CM

## 2025-08-01 RX ORDER — TRAMADOL HYDROCHLORIDE 50 MG/1
50 TABLET ORAL EVERY 12 HOURS PRN
Qty: 60 TABLET | Refills: 0 | Status: SHIPPED | OUTPATIENT
Start: 2025-08-01

## 2025-08-07 ENCOUNTER — OFFICE VISIT (OUTPATIENT)
Age: 65
End: 2025-08-07
Payer: MEDICARE

## (undated) DEVICE — POSITIONER TRIMANO LIMB BEACH CHAIR

## (undated) DEVICE — TUBING ARTHROSCOPIC WAVE  MAIN PUMP

## (undated) DEVICE — TOWEL SET X-RAY

## (undated) DEVICE — GLOVE INDICATOR PI UNDERGLOVE SZ 6.5 BLUE

## (undated) DEVICE — 3M™ IOBAN™ 2 ANTIMICROBIAL INCISE DRAPE 6640EZ: Brand: IOBAN™ 2

## (undated) DEVICE — GLOVE SRG BIOGEL 6.5

## (undated) DEVICE — OCCLUSIVE GAUZE STRIP,3% BISMUTH TRIBROMOPHENATE IN PETROLATUM BLEND: Brand: XEROFORM

## (undated) DEVICE — SYRINGE 3ML LL

## (undated) DEVICE — IV SET EXT SM BORE CARESITE 8IN

## (undated) DEVICE — GLOVE SRG BIOGEL 7.5

## (undated) DEVICE — CANNULA 5.75 X 70MM BARREL SHAPED BOWL

## (undated) DEVICE — GAUZE SPONGES,16 PLY: Brand: CURITY

## (undated) DEVICE — LABEL MEDICATION STERILE 2 YELLOW LIDOCAINE 2 BLUE MARCAINE 2 ORANGE HEPARIN

## (undated) DEVICE — Device

## (undated) DEVICE — CHLORAPREP HI-LITE 26ML ORANGE

## (undated) DEVICE — SKIN MARKER DUAL TIP WITH RULER CAP, FLEXIBLE RULER AND LABELS: Brand: DEVON

## (undated) DEVICE — NEEDLE SUT SCORPION MULTIFIRE

## (undated) DEVICE — SUT ETHILON 4-0 PS-2 18 IN 1667G

## (undated) DEVICE — DUAL SPIKE ADAPTER: Brand: CONMED

## (undated) DEVICE — CANNULA BUTTON 8 X 40MM PASSPORT

## (undated) DEVICE — ASTOUND IMPERVIOUS SURGICAL GOWN: Brand: CONVERTORS

## (undated) DEVICE — PACK ARTHROSCOPY

## (undated) DEVICE — 3M™ TEGADERM™ TRANSPARENT FILM DRESSING FRAME STYLE, 1626W, 4 IN X 4-3/4 IN (10 CM X 12 CM), 50/CT 4CT/CASE: Brand: 3M™ TEGADERM™

## (undated) DEVICE — GLOVE INDICATOR PI UNDERGLOVE SZ 7.5 BLUE

## (undated) DEVICE — TIBURON BEACH CHAIR SHOULDER DRAPE: Brand: CONVERTORS

## (undated) DEVICE — RADIOLOGY STERILE LABELS: Brand: CENTURION

## (undated) DEVICE — TUBING SUCTION 5MM X 12 FT

## (undated) DEVICE — VAPR COOLPULSE 90 ELECTRODE WITH HAND CONTROLS 90 DEGREES SUCTION WITH INTEGRATED HANDPIECE: Brand: VAPR COOLPULSE

## (undated) DEVICE — CANNULA BUTTON 8 X 30MM PASSPORT

## (undated) DEVICE — FIBERTAPE 2MM X 7IN AR-7237-7

## (undated) DEVICE — SPONGE GAUZE 4 X 8 12 PLY STRL LF

## (undated) DEVICE — BLADE SHAVER TORPEDO 4MM 13CM  COOLCUT

## (undated) DEVICE — SYRINGE 10ML LL

## (undated) DEVICE — INTENDED FOR TISSUE SEPARATION, AND OTHER PROCEDURES THAT REQUIRE A SHARP SURGICAL BLADE TO PUNCTURE OR CUT.: Brand: BARD-PARKER SAFETY BLADES SIZE 11, STERILE

## (undated) DEVICE — NEEDLE SPINAL 22G X 5IN QUINCKE

## (undated) DEVICE — BLADE SHAVER EXCALIBUR 4MM 13CM COOLCUT

## (undated) DEVICE — BURR  OVAL 4MM 13CM 8 FLUTE COOLCUT

## (undated) DEVICE — PLASTIC ADHESIVE BANDAGE: Brand: CURITY

## (undated) DEVICE — SYRINGE 5ML LL

## (undated) DEVICE — SYRINGE 50ML LL

## (undated) DEVICE — CHLORAPREP APPLICATOR TINTED 10.5ML ONE-STEP

## (undated) DEVICE — SUT FIBERWIRE #2 1/2 CIRCLE T-5 38IN AR-7200

## (undated) DEVICE — WIPES BABY PAMPERS SENSITIVE 36/PK

## (undated) DEVICE — SCD SEQUENTIAL COMPRESSION COMFORT SLEEVE MEDIUM KNEE LENGTH: Brand: KENDALL SCD